# Patient Record
Sex: FEMALE | Race: WHITE | NOT HISPANIC OR LATINO | Employment: UNEMPLOYED | URBAN - METROPOLITAN AREA
[De-identification: names, ages, dates, MRNs, and addresses within clinical notes are randomized per-mention and may not be internally consistent; named-entity substitution may affect disease eponyms.]

---

## 2018-03-19 ENCOUNTER — HOSPITAL ENCOUNTER (EMERGENCY)
Facility: HOSPITAL | Age: 43
Discharge: LEFT BEFORE DISCHARGE | End: 2018-03-19

## 2018-03-19 VITALS
OXYGEN SATURATION: 99 % | SYSTOLIC BLOOD PRESSURE: 132 MMHG | TEMPERATURE: 98 F | HEART RATE: 77 BPM | HEIGHT: 67 IN | DIASTOLIC BLOOD PRESSURE: 62 MMHG | RESPIRATION RATE: 16 BRPM | BODY MASS INDEX: 34.53 KG/M2 | WEIGHT: 220 LBS

## 2018-05-14 ENCOUNTER — OFFICE VISIT (OUTPATIENT)
Dept: NEUROLOGY | Facility: CLINIC | Age: 43
End: 2018-05-14

## 2018-05-14 VITALS
SYSTOLIC BLOOD PRESSURE: 108 MMHG | HEIGHT: 66 IN | DIASTOLIC BLOOD PRESSURE: 70 MMHG | WEIGHT: 230 LBS | HEART RATE: 65 BPM | RESPIRATION RATE: 18 BRPM | OXYGEN SATURATION: 98 % | BODY MASS INDEX: 36.96 KG/M2

## 2018-05-14 DIAGNOSIS — R40.4 TRANSIENT ALTERATION OF AWARENESS: ICD-10-CM

## 2018-05-14 DIAGNOSIS — G43.C0 PERIODIC HEADACHE SYNDROME, NOT INTRACTABLE: Primary | ICD-10-CM

## 2018-05-14 PROCEDURE — 99245 OFF/OP CONSLTJ NEW/EST HI 55: CPT | Performed by: PSYCHIATRY & NEUROLOGY

## 2018-05-14 RX ORDER — LANOLIN ALCOHOL/MO/W.PET/CERES
1000 CREAM (GRAM) TOPICAL DAILY
COMMUNITY
End: 2019-02-11

## 2018-05-14 RX ORDER — DIVALPROEX SODIUM 500 MG/1
500 TABLET, EXTENDED RELEASE ORAL DAILY
Qty: 30 TABLET | Refills: 10 | Status: SHIPPED | OUTPATIENT
Start: 2018-05-14 | End: 2018-06-13

## 2018-05-14 RX ORDER — CETIRIZINE HYDROCHLORIDE 10 MG/1
10 TABLET ORAL DAILY
COMMUNITY
End: 2019-01-18 | Stop reason: SDUPTHER

## 2018-05-14 NOTE — PROGRESS NOTES
Subjective   Patient ID: Tika Pugh is a 42 y.o. female     Chief Complaint   Patient presents with   • Altered Mental Status   • Dizziness   • Numbness     In hands        History of Present Illness    42 y.o. female referred by Dr Carmen Power for AMS and dizziness.  Sx started in February 2018 trouble understanding and producing speech.  Sx present for a month.      Migraines are daily.  Loses vision and then develops HA.  Base of head has squeezing sensation.  Moderate intensity.  Present since February.  Visual auras.  Sensitive to light and sound.  Trouble with memory.  Assoc sx of dizziness with HA.  Bilateral tingling in hands since March 2018.      One visit to ED at  [facility] 200/100 with alerted speech.    Second visit to ED with altered speech.    24 Heart monitor normal  Echo - normal     Tob 1 ppd    Started B12 injections.     MRI brain, my review of films, 3/21/18 few scattered T2 white matter lesions.     Past Medical History:   Diagnosis Date   • Migraine      Family History   Problem Relation Age of Onset   • Diabetes Father    • Hypertension Father    • Cancer Brother    • Heart disease Maternal Grandmother    • Stroke Paternal Grandmother      Social History     Social History   • Marital status: Unknown     Social History Main Topics   • Smoking status: Current Every Day Smoker     Packs/day: 1.00     Types: Cigarettes   • Alcohol use Yes      Comment: Social   • Drug use: No   • Sexual activity: Defer     Other Topics Concern   • Not on file       Review of Systems   Constitutional: Positive for fatigue. Negative for activity change and unexpected weight change.   HENT: Negative for tinnitus and trouble swallowing.    Eyes: Negative for photophobia and visual disturbance.   Respiratory: Negative for apnea, cough and choking.    Cardiovascular: Negative for leg swelling.   Gastrointestinal: Negative for nausea and vomiting.   Endocrine: Negative for cold intolerance and heat intolerance.  "  Genitourinary: Negative for difficulty urinating, frequency, menstrual problem and urgency.   Musculoskeletal: Negative for back pain, gait problem, myalgias and neck pain.   Skin: Negative for color change and rash.   Allergic/Immunologic: Negative for immunocompromised state.   Neurological: Positive for dizziness, weakness, numbness and headaches. Negative for tremors, seizures, syncope, facial asymmetry, speech difficulty and light-headedness.   Hematological: Negative for adenopathy. Does not bruise/bleed easily.   Psychiatric/Behavioral: Positive for confusion and decreased concentration. Negative for behavioral problems, hallucinations and sleep disturbance.       Objective     Vitals:    05/14/18 1346   BP: 108/70   BP Location: Right arm   Patient Position: Sitting   Cuff Size: Adult   Pulse: 65   Resp: 18   SpO2: 98%   Weight: 104 kg (230 lb)   Height: 167.6 cm (66\")       Neurologic Exam     Mental Status   Oriented to person, place, and time.   Registration: recalls 3 of 3 objects. Recall at 5 minutes: recalls 3 of 3 objects. Follows 3 step commands.   Attention: normal. Concentration: normal.   Speech: speech is normal   Level of consciousness: alert  Knowledge: good and consistent with education.   Able to name object. Able to read. Able to repeat. Able to write. Normal comprehension.     Cranial Nerves     CN II   Visual fields full to confrontation.   Visual acuity: normal  Right visual field deficit: none  Left visual field deficit: none     CN III, IV, VI   Pupils are equal, round, and reactive to light.  Extraocular motions are normal.   Right pupil: Shape: regular. Reactivity: brisk. Consensual response: intact.   Left pupil: Shape: regular. Reactivity: brisk. Consensual response: intact.   Nystagmus: none   Diplopia: none  Ophthalmoparesis: none  Upgaze: normal  Downgaze: normal  Conjugate gaze: present  Vestibulo-ocular reflex: present    CN V   Facial sensation intact.   Right corneal " reflex: normal  Left corneal reflex: normal    CN VII   Right facial weakness: none  Left facial weakness: none    CN VIII   Hearing: intact    CN IX, X   Palate: symmetric  Right gag reflex: normal  Left gag reflex: normal    CN XI   Right sternocleidomastoid strength: normal  Left sternocleidomastoid strength: normal    CN XII   Tongue: not atrophic  Fasciculations: absent  Tongue deviation: none    Motor Exam   Muscle bulk: normal  Overall muscle tone: normal  Right arm tone: normal  Left arm tone: normal  Right leg tone: normal  Left leg tone: normal    Strength   Strength 5/5 throughout.     Sensory Exam   Light touch normal.   Vibration normal.   Proprioception normal.   Pinprick normal.     Gait, Coordination, and Reflexes     Gait  Gait: normal    Coordination   Romberg: negative  Finger to nose coordination: normal  Heel to shin coordination: normal  Tandem walking coordination: normal    Tremor   Resting tremor: absent  Intention tremor: absent  Action tremor: absent    Reflexes   Reflexes 2+ except as noted.       Physical Exam   Constitutional: She is oriented to person, place, and time. Vital signs are normal. She appears well-developed and well-nourished.   HENT:   Head: Normocephalic and atraumatic.   Eyes: EOM and lids are normal. Pupils are equal, round, and reactive to light.   Fundoscopic exam:       The right eye shows no exudate, no hemorrhage and no papilledema. The right eye shows venous pulsations.        The left eye shows no exudate, no hemorrhage and no papilledema. The left eye shows venous pulsations.   Neck: Normal range of motion and phonation normal. Normal carotid pulses present. Carotid bruit is not present. No thyroid mass and no thyromegaly present.   Cardiovascular: Normal rate, regular rhythm and normal heart sounds.    Pulmonary/Chest: Effort normal.   Neurological: She is oriented to person, place, and time. She has normal strength. She has a normal Finger-Nose-Finger Test, a  normal Heel to [place] Test, a normal Romberg Test and a normal Tandem Gait Test. Gait normal.   Skin: Skin is warm and dry.   Psychiatric: She has a normal mood and affect. Her speech is normal.   Nursing note and vitals reviewed.      No results found for any previous visit.         Assessment/Plan     Problem List Items Addressed This Visit        Cardiovascular and Mediastinum    Periodic headache syndrome, not intractable - Primary    Current Assessment & Plan     Headaches are worsening.  Medication changes per orders.     Start Depakote  mg qhs              Relevant Medications    divalproex (DEPAKOTE ER) 500 MG 24 hr tablet       Other    Transient alteration of awareness    Current Assessment & Plan     Possible sz activity vs complex migraines    Start Depakote ER   EEG          Relevant Orders    EEG (Hospital Performed)      Other Visit Diagnoses    None.            No Follow-up on file.

## 2018-08-23 ENCOUNTER — HOSPITAL ENCOUNTER (EMERGENCY)
Facility: HOSPITAL | Age: 43
Discharge: HOME | End: 2018-08-24
Attending: EMERGENCY MEDICINE | Admitting: EMERGENCY MEDICINE

## 2018-08-23 ENCOUNTER — APPOINTMENT (OUTPATIENT)
Dept: ULTRASOUND IMAGING | Facility: HOSPITAL | Age: 43
End: 2018-08-23

## 2018-08-23 ENCOUNTER — APPOINTMENT (OUTPATIENT)
Dept: CT IMAGING | Facility: HOSPITAL | Age: 43
End: 2018-08-23

## 2018-08-23 DIAGNOSIS — Z34.91 FIRST TRIMESTER PREGNANCY: Primary | ICD-10-CM

## 2018-08-23 LAB — HCG INTACT+B SERPL-ACNC: NORMAL MIU/ML

## 2018-08-23 PROCEDURE — 84702 CHORIONIC GONADOTROPIN TEST: CPT | Performed by: PHYSICIAN ASSISTANT

## 2018-08-23 PROCEDURE — 76817 TRANSVAGINAL US OBSTETRIC: CPT

## 2018-08-23 PROCEDURE — 99283 EMERGENCY DEPT VISIT LOW MDM: CPT

## 2018-08-23 RX ORDER — SODIUM CHLORIDE 0.9 % (FLUSH) 0.9 %
10 SYRINGE (ML) INJECTION AS NEEDED
Status: DISCONTINUED | OUTPATIENT
Start: 2018-08-23 | End: 2018-08-23

## 2018-08-23 RX ORDER — ONDANSETRON 2 MG/ML
4 INJECTION INTRAMUSCULAR; INTRAVENOUS ONCE
Status: DISCONTINUED | OUTPATIENT
Start: 2018-08-23 | End: 2018-08-23

## 2018-08-24 VITALS
RESPIRATION RATE: 18 BRPM | HEART RATE: 80 BPM | BODY MASS INDEX: 38.15 KG/M2 | SYSTOLIC BLOOD PRESSURE: 125 MMHG | WEIGHT: 237.38 LBS | TEMPERATURE: 98.1 F | OXYGEN SATURATION: 100 % | DIASTOLIC BLOOD PRESSURE: 66 MMHG | HEIGHT: 66 IN

## 2018-08-24 RX ORDER — PNV NO.118/IRON FUMARATE/FA 29 MG-1 MG
1 TABLET,CHEWABLE ORAL DAILY
Qty: 30 TABLET | Refills: 0 | Status: SHIPPED | OUTPATIENT
Start: 2018-08-24 | End: 2018-09-23

## 2018-08-24 NOTE — ED PROVIDER NOTES
Subjective    42-year-old female presents with possible pregnancy.  She is on double shots every 3 months, she's had an irregular period for several years.  She states that she took several pregnancy test that showed positive.  She's had some nausea and abdominal cramping.  She is also had some spotting she come to the emergency department to be evaluated for possible pregnancy while on Shots.  She's never been pregnant.  No abdominal pain  No fever no chills        History provided by:  Patient   used: No        Review of Systems   Gastrointestinal: Positive for nausea.   Genitourinary: Positive for menstrual problem.        Possible pregnancy   All other systems reviewed and are negative.      Past Medical History:   Diagnosis Date   • Migraine        No Known Allergies    History reviewed. No pertinent surgical history.    Family History   Problem Relation Age of Onset   • Diabetes Father    • Hypertension Father    • Cancer Brother    • Heart disease Maternal Grandmother    • Stroke Paternal Grandmother        Social History     Social History   • Marital status: Unknown     Social History Main Topics   • Smoking status: Current Every Day Smoker     Packs/day: 1.00     Types: Cigarettes   • Alcohol use Yes      Comment: Social   • Drug use: No   • Sexual activity: Defer     Other Topics Concern   • Not on file           Objective   Physical Exam   Constitutional: She is oriented to person, place, and time. She appears well-developed and well-nourished.   Eyes: EOM are normal.   Neck: Normal range of motion. Neck supple.   Cardiovascular: Normal rate and regular rhythm.    Pulmonary/Chest: Effort normal and breath sounds normal.   Abdominal: Soft. Bowel sounds are normal. She exhibits no distension. There is no tenderness. There is no guarding.   Musculoskeletal: Normal range of motion.   Neurological: She is alert and oriented to person, place, and time. She has normal reflexes.   Skin: Skin  is warm and dry.   Psychiatric: She has a normal mood and affect.   Nursing note and vitals reviewed.      Procedures           ED Course  ED Course as of Aug 24 0022   Thu Aug 23, 2018   2320  HCG 11,626, transvaginal ultrasound ordered  [CS]   Fri Aug 24, 2018   0018  Discussed with Dr. Nowak,  patient is 6 weeks gestation, no active bleeding.  She reports that she is blood type A- however she has not had any bleeding in the last several days she had small spotting weeks ago.  Dr. Nowak stated no Rhogham needed at this time  [CS]      ED Course User Index  [CS] Sridhar Bowden Jr., PA-C                  [facility]      Final diagnoses:   First trimester pregnancy            Sridhar Bowden Jr., PA-C  08/24/18 0022

## 2018-08-24 NOTE — ED NOTES
Called lab about outstanding lab orders; per lab can't add on didn't have purple top; requested lab draw;  will draw labs      Brittney Riley, ASHANTI  08/24/18 0011

## 2018-08-28 ENCOUNTER — INITIAL PRENATAL (OUTPATIENT)
Dept: OBSTETRICS AND GYNECOLOGY | Facility: CLINIC | Age: 43
End: 2018-08-28

## 2018-08-28 VITALS — WEIGHT: 229 LBS | DIASTOLIC BLOOD PRESSURE: 70 MMHG | SYSTOLIC BLOOD PRESSURE: 122 MMHG | BODY MASS INDEX: 36.96 KG/M2

## 2018-08-28 DIAGNOSIS — R51.9 CHRONIC NONINTRACTABLE HEADACHE, UNSPECIFIED HEADACHE TYPE: ICD-10-CM

## 2018-08-28 DIAGNOSIS — K21.9 GASTROESOPHAGEAL REFLUX DISEASE, ESOPHAGITIS PRESENCE NOT SPECIFIED: ICD-10-CM

## 2018-08-28 DIAGNOSIS — O09.521 AMA (ADVANCED MATERNAL AGE) MULTIGRAVIDA 35+, FIRST TRIMESTER: ICD-10-CM

## 2018-08-28 DIAGNOSIS — G89.29 CHRONIC NONINTRACTABLE HEADACHE, UNSPECIFIED HEADACHE TYPE: ICD-10-CM

## 2018-08-28 DIAGNOSIS — O99.331 MATERNAL TOBACCO USE IN FIRST TRIMESTER: ICD-10-CM

## 2018-08-28 DIAGNOSIS — Z34.91 PRENATAL CARE IN FIRST TRIMESTER: Primary | ICD-10-CM

## 2018-08-28 LAB
C TRACH RRNA SPEC DONR QL NAA+PROBE: NEGATIVE
N GONORRHOEA DNA SPEC QL NAA+PROBE: NEGATIVE

## 2018-08-28 PROCEDURE — 0501F PRENATAL FLOW SHEET: CPT | Performed by: OBSTETRICS & GYNECOLOGY

## 2018-08-28 NOTE — PROGRESS NOTES
@SUBJECTIVEBEGIN  Chief Complaint   Patient presents with   • Possible Pregnancy     NEW OB. HAD ULTRASOUND IN THE ER ON FRIDAY. LMP IS UNKNOWN AND JUST WASNT FEELING GOOD. LAST PAP WAS 2 YEARS AGO       Tika Pugh is a 42 y.o. year old .  No LMP recorded (lmp unknown). Patient is pregnant.  She presents to be seen to initiate prenatal care.     This is an unplanned but desired pregnancy.  She reports being on Depo-Provera injections, but her last injection was 4/10/18.  She thought the chances of her becoming pregnant were slim she didn't follow up for her next shot.  She has always wanted a child and this is a pleasant surprise for her.  She denies any vaginal bleeding or pelvic discomfort/pain.  She reports mild nausea.      She is currently taking Zyrtec and vitamin B12.  She reports a profound deficiency requiring aggressive replacement in the past.  Her most recent levels have been stable.    She reports a long history with migraines and inquires about safe medication to take term pregnancy.    She has a history significant for GERD and ulcers.  No current symptoms at the moment.    Smoking status: Current Every Day Smoker                                                   Packs/day: 1.00      Years: 0.00         Types: Cigarettes     Smokeless tobacco: Not on file                       She is willing to quit smoking and would like to discuss options for this today.    ROS: All systems were reviewed and negative except as stated in the history of present illness.    The following portions of the patient's history were reviewed and updated as appropriate:vital signs, allergies, current medications, past medical history, past social history, past surgical history and problem list.    Vitals:    18 1453   BP: 122/70   Weight: 104 kg (229 lb)       PHYSICAL EXAM   General appearance: well nourished, well hydrated, no acute distress   Neck: supple, no masses, trachea midline  Thyroid: no nodules, masses,  tenderness, or enlargement  Lungs: Unlabored respirations  Abdomen: soft, non distended, non-tender, no masses  Genitourinary:   · External genitalia: Normal, no lesions or discharge.  Normal Bartholin's and [place]'s glands.  · Urethra: no discharge  · Bladder: well supported  · Vagina:  Without lesions or discharge  · Cervix:  Normal appearance, without lesions or discharge  · Uterus:  Normal sized, mobile, nontender.   · Adnexa: no masses or tenderness  Lymphatic:   · Neck: no cervical adenopathy  · Groin: no inguinal adenopathy  · Misc. lymph nodes: no other adenopathy   Skin Inspection: no rashes, lesions, or ulcerations  Mental Status Exam:   · Judgment, insight: intact  · Orientation: oriented to time, place, and person  · Memory: intact for recent and remote events  · Mood and affect: no depression, anxiety, or agitation    Lab Review   HCG    Imaging   Pelvic ultrasound report     Independent review of image:  (TVUS 8/23) - IUP at 6+0 weeks with positive fetal cardiac activity, SOPHIE 04/18/19    ASSESSMENT  1.  IUP at 6+5 weeks  2.  Supervision of low risk pregnancy  3.  AMA  4.  Chronic headaches  5.  GERD  6. Tobacco use in pregnancy    PLAN  The problem list for pregnancy was initiated today    Tests ordered today:  Orders Placed This Encounter   Procedures   • OB Panel With HIV   • Cystic Fibrosis Diagnostic Study   • Urinalysis With Culture If Indicated - Urine, Clean Catch     Pap smear was collected today.     Testing for GC / Chlamydia / trichomonas was done today    Genetic testing reviewed: she has considered the information and is interested in having genetic testing performed.  This will be done at her next visit    Information reviewed: smoking in pregnancy, exercise in pregnancy, nutrition in pregnancy, weight gain in pregnancy, work and travel restrictions during pregnancy, list of OTC medications acceptable in pregnancy and call coverage groups.      We discussed starting with Tylenol for  headaches. SAB precautions were reviewed.    We discussed smoking cessation options today.  We discussed that vapor use during pregnancy has not been well studied as of yet.  She is interested in nicotine patches.  These were ordered.    Follow up: 4 week(s)    Mark Nowak MD  Obstetrics and Gynecology  Psychiatric

## 2018-08-29 PROBLEM — R40.4 TRANSIENT ALTERATION OF AWARENESS: Status: RESOLVED | Noted: 2018-05-14 | Resolved: 2018-08-29

## 2018-08-29 PROBLEM — O09.521 AMA (ADVANCED MATERNAL AGE) MULTIGRAVIDA 35+, FIRST TRIMESTER: Status: ACTIVE | Noted: 2018-08-29

## 2018-08-29 PROBLEM — G89.29 CHRONIC NONINTRACTABLE HEADACHE: Status: ACTIVE | Noted: 2018-08-29

## 2018-08-29 PROBLEM — O99.331 MATERNAL TOBACCO USE IN FIRST TRIMESTER: Status: ACTIVE | Noted: 2018-08-29

## 2018-08-29 PROBLEM — G43.C0 PERIODIC HEADACHE SYNDROME, NOT INTRACTABLE: Status: RESOLVED | Noted: 2018-05-14 | Resolved: 2018-08-29

## 2018-08-29 PROBLEM — R51.9 CHRONIC NONINTRACTABLE HEADACHE: Status: ACTIVE | Noted: 2018-08-29

## 2018-08-29 PROBLEM — K21.9 GASTROESOPHAGEAL REFLUX DISEASE: Status: ACTIVE | Noted: 2018-08-29

## 2018-08-29 RX ORDER — NICOTINE 21 MG/24HR
1 PATCH, TRANSDERMAL 24 HOURS TRANSDERMAL EVERY 24 HOURS
Qty: 30 PATCH | Refills: 5 | Status: SHIPPED | OUTPATIENT
Start: 2018-08-29 | End: 2019-02-11

## 2018-09-07 DIAGNOSIS — Z34.91 PRENATAL CARE IN FIRST TRIMESTER: ICD-10-CM

## 2018-09-10 LAB
ABO GROUP BLD: (no result)
APPEARANCE UR: ABNORMAL
BACTERIA #/AREA URNS HPF: ABNORMAL /HPF
BACTERIA UR CULT: NORMAL
BACTERIA UR CULT: NORMAL
BASOPHILS # BLD AUTO: 0 X10E3/UL (ref 0–0.2)
BASOPHILS NFR BLD AUTO: 0 %
BILIRUB UR QL STRIP: NEGATIVE
BLD GP AB SCN SERPL QL: NEGATIVE
CFTR MUT ANL BLD/T: NORMAL
COLOR UR: YELLOW
CRYSTALS URNS MICRO: ABNORMAL
EOSINOPHIL # BLD AUTO: 0.2 X10E3/UL (ref 0–0.4)
EOSINOPHIL NFR BLD AUTO: 2 %
EPI CELLS #/AREA URNS HPF: >10 /HPF
ERYTHROCYTE [DISTWIDTH] IN BLOOD BY AUTOMATED COUNT: 14.7 % (ref 12.3–15.4)
GLUCOSE UR QL: NEGATIVE
HBV SURFACE AG SERPL QL IA: NEGATIVE
HCT VFR BLD AUTO: 39.1 % (ref 34–46.6)
HCV AB S/CO SERPL IA: 0.1 S/CO RATIO (ref 0–0.9)
HGB BLD-MCNC: 13.4 G/DL (ref 11.1–15.9)
HGB UR QL STRIP: ABNORMAL
HIV 1+2 AB+HIV1 P24 AG SERPL QL IA: NON REACTIVE
IMM GRANULOCYTES # BLD: 0 X10E3/UL (ref 0–0.1)
IMM GRANULOCYTES NFR BLD: 0 %
KETONES UR QL STRIP: NEGATIVE
LABORATORY COMMENT REPORT: NORMAL
LEUKOCYTE ESTERASE UR QL STRIP: ABNORMAL
LYMPHOCYTES # BLD AUTO: 2.1 X10E3/UL (ref 0.7–3.1)
LYMPHOCYTES NFR BLD AUTO: 23 %
MCH RBC QN AUTO: 30.3 PG (ref 26.6–33)
MCHC RBC AUTO-ENTMCNC: 34.3 G/DL (ref 31.5–35.7)
MCV RBC AUTO: 89 FL (ref 79–97)
MICRO URNS: ABNORMAL
MONOCYTES # BLD AUTO: 0.6 X10E3/UL (ref 0.1–0.9)
MONOCYTES NFR BLD AUTO: 7 %
MUCOUS THREADS URNS QL MICRO: PRESENT /HPF
NEUTROPHILS # BLD AUTO: 6.4 X10E3/UL (ref 1.4–7)
NEUTROPHILS NFR BLD AUTO: 68 %
NITRITE UR QL STRIP: NEGATIVE
PH UR STRIP: 5 [PH] (ref 5–7.5)
PLATELET # BLD AUTO: 262 X10E3/UL (ref 150–379)
PROT UR QL STRIP: ABNORMAL
RBC # BLD AUTO: 4.42 X10E6/UL (ref 3.77–5.28)
RBC #/AREA URNS HPF: ABNORMAL /HPF
RH BLD: NEGATIVE
RPR SER QL: NON REACTIVE
RUBV IGG SERPL IA-ACNC: 12.5 INDEX
SP GR UR: 1.02 (ref 1–1.03)
UNIDENT CRYS URNS QL MICRO: PRESENT /LPF
URINALYSIS REFLEX: ABNORMAL
UROBILINOGEN UR STRIP-MCNC: 0.2 MG/DL (ref 0.2–1)
WBC # BLD AUTO: 9.4 X10E3/UL (ref 3.4–10.8)
WBC #/AREA URNS HPF: >30 /HPF

## 2018-09-13 PROBLEM — Z67.91 RH NEGATIVE STATUS DURING PREGNANCY: Status: ACTIVE | Noted: 2018-08-28

## 2018-09-13 PROBLEM — O26.899 RH NEGATIVE STATUS DURING PREGNANCY: Status: ACTIVE | Noted: 2018-08-28

## 2018-09-24 ENCOUNTER — ROUTINE PRENATAL (OUTPATIENT)
Dept: OBSTETRICS AND GYNECOLOGY | Facility: CLINIC | Age: 43
End: 2018-09-24

## 2018-09-24 VITALS — DIASTOLIC BLOOD PRESSURE: 70 MMHG | WEIGHT: 238 LBS | SYSTOLIC BLOOD PRESSURE: 118 MMHG | BODY MASS INDEX: 38.41 KG/M2

## 2018-09-24 DIAGNOSIS — O09.521 AMA (ADVANCED MATERNAL AGE) MULTIGRAVIDA 35+, FIRST TRIMESTER: Primary | ICD-10-CM

## 2018-09-24 PROCEDURE — 0502F SUBSEQUENT PRENATAL CARE: CPT | Performed by: OBSTETRICS & GYNECOLOGY

## 2018-09-29 ENCOUNTER — APPOINTMENT (OUTPATIENT)
Dept: ULTRASOUND IMAGING | Facility: HOSPITAL | Age: 43
End: 2018-09-29

## 2018-09-29 ENCOUNTER — HOSPITAL ENCOUNTER (EMERGENCY)
Facility: HOSPITAL | Age: 43
Discharge: HOME | End: 2018-09-29
Attending: EMERGENCY MEDICINE | Admitting: EMERGENCY MEDICINE

## 2018-09-29 VITALS
OXYGEN SATURATION: 100 % | TEMPERATURE: 98.2 F | HEIGHT: 66 IN | BODY MASS INDEX: 37.73 KG/M2 | HEART RATE: 65 BPM | WEIGHT: 234.8 LBS | SYSTOLIC BLOOD PRESSURE: 111 MMHG | DIASTOLIC BLOOD PRESSURE: 53 MMHG | RESPIRATION RATE: 17 BRPM

## 2018-09-29 DIAGNOSIS — O46.8X1 SUBCHORIONIC HEMORRHAGE OF PLACENTA IN FIRST TRIMESTER, SINGLE OR UNSPECIFIED FETUS: Primary | ICD-10-CM

## 2018-09-29 DIAGNOSIS — O41.8X10 SUBCHORIONIC HEMORRHAGE OF PLACENTA IN FIRST TRIMESTER, SINGLE OR UNSPECIFIED FETUS: Primary | ICD-10-CM

## 2018-09-29 LAB
ANION GAP SERPL CALCULATED.3IONS-SCNC: 9.1 MMOL/L (ref 10–20)
B-HCG UR QL: POSITIVE
BACTERIA UR QL AUTO: ABNORMAL /HPF
BASOPHILS # BLD AUTO: 0.05 10*3/MM3 (ref 0–0.2)
BASOPHILS NFR BLD AUTO: 0.6 % (ref 0–2.5)
BILIRUB UR QL STRIP: NEGATIVE
BUN BLD-MCNC: 6 MG/DL (ref 7–20)
BUN/CREAT SERPL: 12 (ref 7.1–23.5)
CALCIUM SPEC-SCNC: 9.3 MG/DL (ref 8.4–10.2)
CHLORIDE SERPL-SCNC: 109 MMOL/L (ref 98–107)
CLARITY UR: ABNORMAL
CO2 SERPL-SCNC: 24 MMOL/L (ref 26–30)
COLOR UR: YELLOW
CREAT BLD-MCNC: 0.5 MG/DL (ref 0.6–1.3)
DEPRECATED RDW RBC AUTO: 47.3 FL (ref 37–54)
EOSINOPHIL # BLD AUTO: 0.21 10*3/MM3 (ref 0–0.7)
EOSINOPHIL NFR BLD AUTO: 2.6 % (ref 0–7)
ERYTHROCYTE [DISTWIDTH] IN BLOOD BY AUTOMATED COUNT: 14.2 % (ref 11.5–14.5)
GFR SERPL CREATININE-BSD FRML MDRD: 137 ML/MIN/1.73
GLUCOSE BLD-MCNC: 88 MG/DL (ref 74–98)
GLUCOSE UR STRIP-MCNC: NEGATIVE MG/DL
HCG INTACT+B SERPL-ACNC: NORMAL MIU/ML
HCT VFR BLD AUTO: 36.5 % (ref 37–47)
HGB BLD-MCNC: 11.8 G/DL (ref 12–16)
HGB UR QL STRIP.AUTO: ABNORMAL
HYALINE CASTS UR QL AUTO: ABNORMAL /LPF
IMM GRANULOCYTES # BLD: 0.04 10*3/MM3 (ref 0–0.06)
IMM GRANULOCYTES NFR BLD: 0.5 % (ref 0–0.6)
KETONES UR QL STRIP: NEGATIVE
LEUKOCYTE ESTERASE UR QL STRIP.AUTO: NEGATIVE
LYMPHOCYTES # BLD AUTO: 1.58 10*3/MM3 (ref 0.6–3.4)
LYMPHOCYTES NFR BLD AUTO: 19.5 % (ref 10–50)
MCH RBC QN AUTO: 29.4 PG (ref 27–31)
MCHC RBC AUTO-ENTMCNC: 32.3 G/DL (ref 30–37)
MCV RBC AUTO: 90.8 FL (ref 81–99)
MONOCYTES # BLD AUTO: 0.49 10*3/MM3 (ref 0–0.9)
MONOCYTES NFR BLD AUTO: 6 % (ref 0–12)
NEUTROPHILS # BLD AUTO: 5.74 10*3/MM3 (ref 2–6.9)
NEUTROPHILS NFR BLD AUTO: 70.8 % (ref 37–80)
NITRITE UR QL STRIP: NEGATIVE
NRBC BLD MANUAL-RTO: 0 /100 WBC (ref 0–0)
PH UR STRIP.AUTO: 7.5 [PH] (ref 5–8)
PLATELET # BLD AUTO: 230 10*3/MM3 (ref 130–400)
PMV BLD AUTO: 9.3 FL (ref 6–12)
POTASSIUM BLD-SCNC: 4.1 MMOL/L (ref 3.5–5.1)
PROT UR QL STRIP: NEGATIVE
RBC # BLD AUTO: 4.02 10*6/MM3 (ref 4.2–5.4)
RBC # UR: ABNORMAL /HPF
REF LAB TEST METHOD: ABNORMAL
SODIUM BLD-SCNC: 138 MMOL/L (ref 137–145)
SP GR UR STRIP: 1.01 (ref 1–1.03)
SQUAMOUS #/AREA URNS HPF: ABNORMAL /HPF
UROBILINOGEN UR QL STRIP: ABNORMAL
WBC NRBC COR # BLD: 8.11 10*3/MM3 (ref 4.8–10.8)
WBC UR QL AUTO: ABNORMAL /HPF

## 2018-09-29 PROCEDURE — 99283 EMERGENCY DEPT VISIT LOW MDM: CPT

## 2018-09-29 PROCEDURE — 25010000002 RHO D IMMUNE GLOBULIN 1500 UNIT/2ML SOLUTION PREFILLED SYRINGE: Performed by: EMERGENCY MEDICINE

## 2018-09-29 PROCEDURE — 76817 TRANSVAGINAL US OBSTETRIC: CPT

## 2018-09-29 PROCEDURE — 81001 URINALYSIS AUTO W/SCOPE: CPT | Performed by: EMERGENCY MEDICINE

## 2018-09-29 PROCEDURE — 96372 THER/PROPH/DIAG INJ SC/IM: CPT

## 2018-09-29 PROCEDURE — 81025 URINE PREGNANCY TEST: CPT | Performed by: EMERGENCY MEDICINE

## 2018-09-29 PROCEDURE — 85025 COMPLETE CBC W/AUTO DIFF WBC: CPT | Performed by: EMERGENCY MEDICINE

## 2018-09-29 PROCEDURE — 80048 BASIC METABOLIC PNL TOTAL CA: CPT | Performed by: EMERGENCY MEDICINE

## 2018-09-29 PROCEDURE — 84702 CHORIONIC GONADOTROPIN TEST: CPT | Performed by: EMERGENCY MEDICINE

## 2018-09-29 RX ADMIN — HUMAN RHO(D) IMMUNE GLOBULIN 300 MCG: 1500 SOLUTION INTRAMUSCULAR; INTRAVENOUS at 10:44

## 2018-10-01 ENCOUNTER — ROUTINE PRENATAL (OUTPATIENT)
Dept: OBSTETRICS AND GYNECOLOGY | Facility: CLINIC | Age: 43
End: 2018-10-01

## 2018-10-01 VITALS — DIASTOLIC BLOOD PRESSURE: 72 MMHG | BODY MASS INDEX: 38.09 KG/M2 | WEIGHT: 236 LBS | SYSTOLIC BLOOD PRESSURE: 128 MMHG

## 2018-10-01 DIAGNOSIS — D25.9 LEIOMYOMA IN PREGNANCY, UTERINE, ANTEPARTUM: ICD-10-CM

## 2018-10-01 DIAGNOSIS — O34.10 LEIOMYOMA IN PREGNANCY, UTERINE, ANTEPARTUM: ICD-10-CM

## 2018-10-01 DIAGNOSIS — O09.521 ELDERLY MULTIGRAVIDA IN FIRST TRIMESTER: ICD-10-CM

## 2018-10-01 DIAGNOSIS — O26.851 SPOTTING AFFECTING PREGNANCY IN FIRST TRIMESTER: Primary | ICD-10-CM

## 2018-10-01 DIAGNOSIS — O09.91 ENCOUNTER FOR SUPERVISION OF HIGH RISK PREGNANCY IN FIRST TRIMESTER, ANTEPARTUM: ICD-10-CM

## 2018-10-01 PROCEDURE — 0502F SUBSEQUENT PRENATAL CARE: CPT | Performed by: OBSTETRICS & GYNECOLOGY

## 2018-10-01 NOTE — PROGRESS NOTES
Chief Complaint   Patient presents with   • Pregnancy Problem     PATIENT ADVISED SEEN IN ER SATURDAY, VAGINAL BLEEDING. PATIENT ADVISED BLEEDING STOPPED THIS AM.        HPI: Tika is a  currently at 11w4d who today reports the following:  Contractions - No; Leaking - No; Vaginal bleeding -  YES; Heartburn - No.  Pt had been seen over the weekend with vaginal bleeding in ER.  Pt reports heavy worse than a normal menses for her; menses previously light.  Pt had not been doing any unusual activities.  Pt denies any recent sexual activity.  Pt reports she had scan done as well as labs; did receive rhogam.  Pt has continued to have spotting until this am.  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  Pt did have scan done at ER which showed leiomyoma as well as small BILLY.  Pt has been using nicotine patches as well.  Pt does desire genetic screening as well.  ROS:   GI:   Nausea - No; Constipation - No; Diarrhea - No.   Neuro:  Headache - No; Visual disturbances - No.    EXAM:   Vitals:  See prenatal flowsheet as noted and reviewed   Abdomen:   See prenatal flowsheet as noted and reviewed   Pelvic:  See prenatal flowsheet as noted and reviewed   Urine:  See prenatal flowsheet as noted and reviewed     Lab Results   Component Value Date    ABO A 2018    RH Negative 2018    ABSCRN Negative 2018       Results for orders placed in visit on 10/01/18   US Ob Transvaginal    Narrative Tika Pugh  : 1975  MRN: [id number]  Date: 10/1/2018    Reason for exam/History:  Vaginal bleeding    Images are reviewed from the transvaginal ultrasound.  An intrauterine   pregnancy is noted.  A gestational sac is seen.  A yolk sac is not noted.    The pregnancy measures 12 weeks 1 days gestation.  Cardiac activity is   noted.  The fetal heart rate measures 179  beats per minute.  The uterus   is noted to have intramural/subserosal leiomyoma measuring 3.9 cm.  There   is also  a 3.1 cm hyperechoic mass seen in the uterus distorting the   gestational sac.  The adnexa was noted to be normal} in appearance. There   was noted to be two small simple cysts of the left ovary 1.8 cm and 1.7   cm.   There was normal vascular flow.  Free fluid was not seen.    The exam limitations noted:  none    See ultrasound report for measurements and structures identified.    Monse Wayne MD, [facility]  OB GYN [place]             MDM:  Impression: Supervision of high risk pregnancy  Vaginal bleeding during pregnancy  AMA  Tobacco use in pregnancy   BILLY  Leiomyoma   Tests done today: U/S for evaluation of bleeding; see report   Topics discussed: ab precautions  tobacco use  Recommend modified bedrest; off work x 1 week; pelvic rest   Tests next visit: U/S for for f/u of the above - repeat in 1 week; will probably need referral to [facility]  Genetic screening as desired     This note was electronically signed.  Monse Wayne M.D.

## 2018-10-08 ENCOUNTER — ROUTINE PRENATAL (OUTPATIENT)
Dept: OBSTETRICS AND GYNECOLOGY | Facility: CLINIC | Age: 43
End: 2018-10-08

## 2018-10-08 VITALS — DIASTOLIC BLOOD PRESSURE: 70 MMHG | BODY MASS INDEX: 37.45 KG/M2 | WEIGHT: 232 LBS | SYSTOLIC BLOOD PRESSURE: 130 MMHG

## 2018-10-08 DIAGNOSIS — O09.521 AMA (ADVANCED MATERNAL AGE) MULTIGRAVIDA 35+, FIRST TRIMESTER: Primary | ICD-10-CM

## 2018-10-08 PROCEDURE — 0502F SUBSEQUENT PRENATAL CARE: CPT | Performed by: OBSTETRICS & GYNECOLOGY

## 2018-10-08 NOTE — PROGRESS NOTES
Chief Complaint   Patient presents with   • Routine Prenatal Visit     FOLLOW UP ULTRASOUND, PATIENT WANTS TO DISCUSS HEPATIITS A VACCINE, HAD FAMILY MEMBER RECENTLY DIAGNOSED.         HPI:   , 12w4d gestation reports doing well    ROS:  See Prenatal Episode/Flowsheet  /70   Wt 105 kg (232 lb)   LMP  (LMP Unknown)   BMI 37.45 kg/m²      EXAM:  EXTREMITIES:  No swelling-See Prenatal Episode/Flowsheet    ABDOMEN:  FHTs/Movement noted-See Prenatal Episode/Flowsheet    URINE GLUCOSE/PROTEIN:  See Prenatal Episode/Flowsheet    PELVIC EXAM:  See Prenatal Episode/Flowsheet  CV:  Lungs:    MDM:    Lab Results   Component Value Date    HGB 11.8 (L) 2018    RUBELLAABIGG 12.50 2018    HEPBSAG Negative 2018    ABO A 2018    RH Negative 2018    ABSCRN Negative 2018    ZUJ1XAX7 Non Reactive 2018    HEPCVIRUSABY 0.1 2018    URINECX Final report 2018       U/S: Stable 3 senna meter posterior intramural fibroid.  Hyperechoic area consistent with probable subchorionic hematoma noted last time is not evident today.  Fetal heart tones 165.    1. IUP 12w4d  2. Routine care   3. Rec HAV given potnetial exposuires  4. Cont Light actitivy  5. Informaseq today

## 2018-10-10 ENCOUNTER — ROUTINE PRENATAL (OUTPATIENT)
Dept: OBSTETRICS AND GYNECOLOGY | Facility: CLINIC | Age: 43
End: 2018-10-10

## 2018-10-10 ENCOUNTER — TELEPHONE (OUTPATIENT)
Dept: OBSTETRICS AND GYNECOLOGY | Facility: CLINIC | Age: 43
End: 2018-10-10

## 2018-10-10 VITALS — BODY MASS INDEX: 37.77 KG/M2 | SYSTOLIC BLOOD PRESSURE: 120 MMHG | WEIGHT: 234 LBS | DIASTOLIC BLOOD PRESSURE: 70 MMHG

## 2018-10-10 DIAGNOSIS — D25.9 LEIOMYOMA IN PREGNANCY, UTERINE, ANTEPARTUM: ICD-10-CM

## 2018-10-10 DIAGNOSIS — O34.10 LEIOMYOMA IN PREGNANCY, UTERINE, ANTEPARTUM: ICD-10-CM

## 2018-10-10 DIAGNOSIS — O09.521 ELDERLY MULTIGRAVIDA IN FIRST TRIMESTER: ICD-10-CM

## 2018-10-10 DIAGNOSIS — O26.851 SPOTTING AFFECTING PREGNANCY IN FIRST TRIMESTER: ICD-10-CM

## 2018-10-10 DIAGNOSIS — O46.8X1 SUBCHORIONIC HEMATOMA IN FIRST TRIMESTER, SINGLE OR UNSPECIFIED FETUS: ICD-10-CM

## 2018-10-10 DIAGNOSIS — O09.91 ENCOUNTER FOR SUPERVISION OF HIGH RISK PREGNANCY IN FIRST TRIMESTER, ANTEPARTUM: Primary | ICD-10-CM

## 2018-10-10 DIAGNOSIS — O41.8X10 SUBCHORIONIC HEMATOMA IN FIRST TRIMESTER, SINGLE OR UNSPECIFIED FETUS: ICD-10-CM

## 2018-10-10 PROCEDURE — 0502F SUBSEQUENT PRENATAL CARE: CPT | Performed by: OBSTETRICS & GYNECOLOGY

## 2018-10-10 NOTE — TELEPHONE ENCOUNTER
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  Limiting hours to 8 or less a day.  At least a 30 minute break during a shiftfor the next 2 weeks

## 2018-10-10 NOTE — TELEPHONE ENCOUNTER
----- Message from Bk Britt sent at 10/10/2018  9:19 AM EDT -----  Contact: patient  Patient seen Dr. Jeffries on 10/8 and was advised to be on light duty for the next two weeks. Patient called back today stating she returned to work yesterday for a 10 hour shift that involves a lot of walking. Patient is a requesting a more detailed note stating what she can and cannot do.    Please inform patient if this note has been approved or not & she will .  Thanks.

## 2018-10-11 NOTE — PROGRESS NOTES
Chief Complaint   Patient presents with   • Pregnancy Ultrasound     TVS done due to bleeding and cramping since 10:00 am this morning; having dizziness today also       HPI: Tika is a  currently at 13w0d who today reports the following:  Contractions - No; Leaking - No; Vaginal bleeding -  YES; Heartburn - No.  Pt had been off work last week; bleeding subsided.  Pt seen here Monday.  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  Pt reports bleeding is bright red in nature; has pictures on phone which shows light bright red blood on pad.  ROS:   GI:   Nausea - No; Constipation - No; Diarrhea - No.   Neuro:  Headache - No; Visual disturbances - No.    EXAM:   Vitals:  See prenatal flowsheet as noted and reviewed   Abdomen:   See prenatal flowsheet as noted and reviewed   Pelvic:  See prenatal flowsheet as noted and reviewed   Urine:  See prenatal flowsheet as noted and reviewed     Lab Results   Component Value Date    ABO A 2018    RH Negative 2018    ABSCRN Negative 2018     See scan report today which images are reviewed as noted    Images are reviewed from the transvaginal ultrasound.  An intrauterine pregnancy is noted. The crown rump length was measured correlating with a pregnancy of 13 weeks 2 days gestation.  Cardiac activity is noted.  The fetal heart rate measures 161 beats per minute.   A 3.6 cm heterogenous area is seen near the gestational sac and a smaller subchorionic hematoma is seen near the cervix measuring 1.6 cm.  The cervix appears normal.  A 3.8 cm fundal leiomyoma appears the same.    MDM:  Impression: Supervision of high risk pregnancy  BILLY  AMA   Leiomyoma  RH negative - received rhogam previously   Tests done today: U/S for bleeding   Topics discussed: Pelvic rest  Modified bedrest  Note off work until f/u appt  Instructions and precautions given   Tests next visit: U/S for of BILLY     This note was electronically signed.  Monse Wayne  M.D.

## 2018-10-15 LAB
# FETUSES US: 1
CFDNA.FET/CFDNA.TOTAL SFR FETUS: 5 %
CHR X + Y ANEUP PLAS.CFDNA: NORMAL
CITATION REF LAB TEST: NORMAL
CYTOGENETICS STUDY: NORMAL
FET 13+18+21+X+Y ANEUP PLAS.CFDNA: NORMAL
FET CHR 13 TS PLAS.CFDNA QL: NORMAL
FET CHR 13 TS PLAS.CFDNA QL: NORMAL
FET CHR 18 TS PLAS.CFDNA QL: NORMAL
FET CHR 18 TS PLAS.CFDNA QL: NORMAL
FET CHR 21 TS PLAS.CFDNA QL: NORMAL
FET CHR 21 TS PLAS.CFDNA QL: NORMAL
FET CHROM X + Y ANEUP CFDNA IMP: NORMAL
GA: 12.6 WEEKS
GENETIC ALGORITHM SENSITIVITY: NORMAL %
LAB DIRECTOR NAME PROVIDER: NORMAL
Lab: NORMAL
REASON FOR REFERRAL (NARRATIVE): NORMAL
REF LAB TEST METHOD: NORMAL
SERVICE CMNT 02-IMP: NORMAL
SERVICE CMNT-IMP: NORMAL

## 2018-10-22 ENCOUNTER — ROUTINE PRENATAL (OUTPATIENT)
Dept: OBSTETRICS AND GYNECOLOGY | Facility: CLINIC | Age: 43
End: 2018-10-22

## 2018-10-22 VITALS — BODY MASS INDEX: 38.9 KG/M2 | WEIGHT: 241 LBS | DIASTOLIC BLOOD PRESSURE: 60 MMHG | SYSTOLIC BLOOD PRESSURE: 119 MMHG

## 2018-10-22 DIAGNOSIS — O34.10 LEIOMYOMA IN PREGNANCY, UTERINE, ANTEPARTUM: ICD-10-CM

## 2018-10-22 DIAGNOSIS — Z3A.14 14 WEEKS GESTATION OF PREGNANCY: Primary | ICD-10-CM

## 2018-10-22 DIAGNOSIS — O09.521 AMA (ADVANCED MATERNAL AGE) MULTIGRAVIDA 35+, FIRST TRIMESTER: ICD-10-CM

## 2018-10-22 DIAGNOSIS — O46.8X1 SUBCHORIONIC HEMATOMA IN FIRST TRIMESTER, SINGLE OR UNSPECIFIED FETUS: ICD-10-CM

## 2018-10-22 DIAGNOSIS — O09.92 ENCOUNTER FOR SUPERVISION OF HIGH RISK PREGNANCY IN SECOND TRIMESTER, ANTEPARTUM: ICD-10-CM

## 2018-10-22 DIAGNOSIS — D25.9 LEIOMYOMA IN PREGNANCY, UTERINE, ANTEPARTUM: ICD-10-CM

## 2018-10-22 DIAGNOSIS — O41.8X10 SUBCHORIONIC HEMATOMA IN FIRST TRIMESTER, SINGLE OR UNSPECIFIED FETUS: ICD-10-CM

## 2018-10-22 PROCEDURE — 0502F SUBSEQUENT PRENATAL CARE: CPT | Performed by: OBSTETRICS & GYNECOLOGY

## 2018-10-22 NOTE — PROGRESS NOTES
Chief Complaint   Patient presents with   • Routine Prenatal Visit       HPI: Tika is a  currently at 14w4d who today reports the following:  Contractions - No; Leaking - No; Vaginal bleeding -  No; Heartburn - No.  Pt has remained off work; has not been doing any strenuous activities; has been on modified rest.  Pt with no bleeding now x 1 week.  ROS:   GI:   Nausea - No; Constipation - No; Diarrhea - No.   Neuro:  Headache - No; Visual disturbances - No.    EXAM:   Vitals:  See prenatal flowsheet as noted and reviewed   Abdomen:   See prenatal flowsheet as noted and reviewed   Pelvic:  See prenatal flowsheet as noted and reviewed   Urine:  See prenatal flowsheet as noted and reviewed     Lab Results   Component Value Date    ABO A 2018    RH Negative 2018    ABSCRN Negative 2018       MDM:  Impression: Supervision of high risk pregnancy  Known leiomyoma  BILLY  AMA  Tobacco use in pregnancy   Tests done today: none   Topics discussed: ab precautions   Pt to remain off work   Tests next visit: U/S for f/u of BILLY and leiomyoma     This note was electronically signed.  Monse Wayne M.D.

## 2018-10-29 ENCOUNTER — ROUTINE PRENATAL (OUTPATIENT)
Dept: OBSTETRICS AND GYNECOLOGY | Facility: CLINIC | Age: 43
End: 2018-10-29

## 2018-10-29 VITALS — BODY MASS INDEX: 38.25 KG/M2 | SYSTOLIC BLOOD PRESSURE: 128 MMHG | WEIGHT: 237 LBS | DIASTOLIC BLOOD PRESSURE: 72 MMHG

## 2018-10-29 DIAGNOSIS — O99.331 MATERNAL TOBACCO USE IN FIRST TRIMESTER: ICD-10-CM

## 2018-10-29 DIAGNOSIS — Z67.91 RH NEGATIVE STATUS DURING PREGNANCY IN SECOND TRIMESTER: ICD-10-CM

## 2018-10-29 DIAGNOSIS — Z34.92 PRENATAL CARE IN SECOND TRIMESTER: Primary | ICD-10-CM

## 2018-10-29 DIAGNOSIS — O26.892 RH NEGATIVE STATUS DURING PREGNANCY IN SECOND TRIMESTER: ICD-10-CM

## 2018-10-29 DIAGNOSIS — O09.521 AMA (ADVANCED MATERNAL AGE) MULTIGRAVIDA 35+, FIRST TRIMESTER: ICD-10-CM

## 2018-10-29 PROCEDURE — 0502F SUBSEQUENT PRENATAL CARE: CPT | Performed by: OBSTETRICS & GYNECOLOGY

## 2018-11-15 ENCOUNTER — ROUTINE PRENATAL (OUTPATIENT)
Dept: OBSTETRICS AND GYNECOLOGY | Facility: CLINIC | Age: 43
End: 2018-11-15

## 2018-11-15 VITALS — DIASTOLIC BLOOD PRESSURE: 74 MMHG | SYSTOLIC BLOOD PRESSURE: 122 MMHG | BODY MASS INDEX: 39.22 KG/M2 | WEIGHT: 243 LBS

## 2018-11-15 DIAGNOSIS — O09.521 AMA (ADVANCED MATERNAL AGE) MULTIGRAVIDA 35+, FIRST TRIMESTER: ICD-10-CM

## 2018-11-15 DIAGNOSIS — O26.892 RH NEGATIVE STATUS DURING PREGNANCY IN SECOND TRIMESTER: ICD-10-CM

## 2018-11-15 DIAGNOSIS — D51.9 ANEMIA DUE TO VITAMIN B12 DEFICIENCY, UNSPECIFIED B12 DEFICIENCY TYPE: ICD-10-CM

## 2018-11-15 DIAGNOSIS — O99.331 MATERNAL TOBACCO USE IN FIRST TRIMESTER: ICD-10-CM

## 2018-11-15 DIAGNOSIS — Z34.92 PRENATAL CARE IN SECOND TRIMESTER: Primary | ICD-10-CM

## 2018-11-15 DIAGNOSIS — D25.9 UTERINE LEIOMYOMA, UNSPECIFIED LOCATION: ICD-10-CM

## 2018-11-15 DIAGNOSIS — Z3A.18 18 WEEKS GESTATION OF PREGNANCY: Primary | ICD-10-CM

## 2018-11-15 DIAGNOSIS — Z67.91 RH NEGATIVE STATUS DURING PREGNANCY IN SECOND TRIMESTER: ICD-10-CM

## 2018-11-15 LAB
BASOPHILS # BLD AUTO: 0.04 10*3/MM3 (ref 0–0.2)
BASOPHILS NFR BLD AUTO: 0.4 % (ref 0–2.5)
EOSINOPHIL # BLD AUTO: 0.24 10*3/MM3 (ref 0–0.7)
EOSINOPHIL NFR BLD AUTO: 2.2 % (ref 0–7)
ERYTHROCYTE [DISTWIDTH] IN BLOOD BY AUTOMATED COUNT: 14.6 % (ref 11.5–14.5)
FOLATE SERPL-MCNC: 15.9 NG/ML
HCT VFR BLD AUTO: 34.7 % (ref 37–47)
HGB BLD-MCNC: 11.1 G/DL (ref 12–16)
IMM GRANULOCYTES # BLD: 0.03 10*3/MM3 (ref 0–0.06)
IMM GRANULOCYTES NFR BLD: 0.3 % (ref 0–0.6)
LYMPHOCYTES # BLD AUTO: 1.84 10*3/MM3 (ref 0.6–3.4)
LYMPHOCYTES NFR BLD AUTO: 16.8 % (ref 10–50)
MCH RBC QN AUTO: 28.8 PG (ref 27–31)
MCHC RBC AUTO-ENTMCNC: 32 G/DL (ref 30–37)
MCV RBC AUTO: 90.1 FL (ref 81–99)
MONOCYTES # BLD AUTO: 0.71 10*3/MM3 (ref 0–0.9)
MONOCYTES NFR BLD AUTO: 6.5 % (ref 0–12)
NEUTROPHILS # BLD AUTO: 8.09 10*3/MM3 (ref 2–6.9)
NEUTROPHILS NFR BLD AUTO: 73.8 % (ref 37–80)
NRBC BLD AUTO-RTO: 0 /100 WBC (ref 0–0)
PLATELET # BLD AUTO: 221 10*3/MM3 (ref 130–400)
RBC # BLD AUTO: 3.85 10*6/MM3 (ref 4.2–5.4)
VIT B12 SERPL-MCNC: 472 PG/ML (ref 239–931)
WBC # BLD AUTO: 10.95 10*3/MM3 (ref 4.8–10.8)

## 2018-11-15 PROCEDURE — 0502F SUBSEQUENT PRENATAL CARE: CPT | Performed by: OBSTETRICS & GYNECOLOGY

## 2018-11-16 DIAGNOSIS — D50.9 IRON DEFICIENCY ANEMIA, UNSPECIFIED IRON DEFICIENCY ANEMIA TYPE: Primary | ICD-10-CM

## 2018-11-16 RX ORDER — FERROUS SULFATE 325(65) MG
325 TABLET ORAL
Qty: 60 TABLET | Refills: 6 | Status: SHIPPED | OUTPATIENT
Start: 2018-11-16 | End: 2019-04-01

## 2018-11-16 NOTE — PROGRESS NOTES
Chief Complaint   Patient presents with   • Pregnancy Ultrasound     Anatomy scan, No complaints       HPI:   Tika is a  currently at 18w0d who today reports the following:  Contractions - No; Leaking - No; Vaginal bleeding -  No; Swelling of extremities - No. Good fetal movement - YES.     + fatigue, wants to check B12 levels    ROS:  GI: Nausea - No; Constipation - No; Diarrhea - No. RUQ pain - No    Neuro: Headache - No; Visual disturbances - No.    Pertinent items are noted in HPI, all other systems reviewed and negative    Review of History:  The following portions of the patient's history were reviewed and updated as appropriate:problem list, current medications, allergies, past family history, past medical history, past social history and past surgical history.    Current Outpatient Medications on File Prior to Visit   Medication Sig Dispense Refill   • cetirizine (zyrTEC) 10 MG tablet Take 10 mg by mouth Daily.     • Cetirizine-Pseudoephedrine (ZYRTEC-D PO) Take 10 mg by mouth Daily.     • Cyanocobalamin 1000 MCG/ML kit Inject  as directed.     • nicotine (NICODERM CQ) 14 MG/24HR patch Place 1 patch on the skin as directed by provider Daily. 30 patch 5   • vitamin B-12 (CYANOCOBALAMIN) 1000 MCG tablet Take 1,000 mcg by mouth Daily.       No current facility-administered medications on file prior to visit.        EXAM:  /74   Wt 110 kg (243 lb)   LMP  (LMP Unknown)   BMI 39.22 kg/m²     Gen: NAD, conversant  Pulm: No use of accessory muscles, normal respirations  Abdomen: Gravid, nontender, size = dates, + fetal cardiac activity  Ext: no edema, no rashes, WWP  Gait: normal for pregnancy  Psych: Mood, insight, judgement intact  SVE: Not performed     Lab Results   Component Value Date    ABO A 2018    RH Negative 2018    ABSCRN Negative 2018       Social History    Tobacco Use      Smoking status: Current Every Day Smoker        Packs/day: 1.00        Types: Cigarettes       Smokeless tobacco: Never Used      I have reviewed the prenatal labs and previous ultrasounds today.    MDM:  Diagnosis: Supervision of low risk pregnancy  Rh negative  AMA  Tobacco use in pregnancy   Known leiomyoma, stable  Subchorionic hemorrhage, resolved  2 vessel cord  H/o profound B12 deficiency  Fatigue   Tests/Orders/Rx today: Orders Placed This Encounter   Procedures   • Vitamin B12 and Folate   • CBC and Differential     Order Specific Question:   Manual Differential     Answer:   No     IUP at 18+0 weeks. Anatomy was mostly cleared today, but 4 chamber views of the heart and outflow tracts could not be well visualized.  Additionally, there appears to be a two-vessel cord, though this was unsure as well.  No subchorionic hemorrhages seen on today's exam. The fibroid is stable in size.  EFW 227g(55%). Cephalic presentation. Placenta is posterior. Amniotic fluid and fetal heart rate are normal.    Meds Ordered: none   Topics discussed: The above issues   Tests next visit: U/S to complete the anatomic screening   Next visit: 4 week(s)     Mark Nowak MD  Obstetrics and Gynecology  [facility]

## 2018-11-20 PROBLEM — D25.9 UTERINE LEIOMYOMA: Status: ACTIVE | Noted: 2018-11-20

## 2018-11-26 ENCOUNTER — ROUTINE PRENATAL (OUTPATIENT)
Dept: OBSTETRICS AND GYNECOLOGY | Facility: CLINIC | Age: 43
End: 2018-11-26

## 2018-11-26 VITALS — WEIGHT: 245.4 LBS | DIASTOLIC BLOOD PRESSURE: 56 MMHG | SYSTOLIC BLOOD PRESSURE: 120 MMHG | BODY MASS INDEX: 39.61 KG/M2

## 2018-11-26 DIAGNOSIS — M79.89 LEFT LEG SWELLING: Primary | ICD-10-CM

## 2018-11-26 PROCEDURE — 99214 OFFICE O/P EST MOD 30 MIN: CPT | Performed by: OBSTETRICS & GYNECOLOGY

## 2018-11-26 NOTE — PROGRESS NOTES
Chief Complaint   Patient presents with   • Pregnancy Problem     C/O legs swelling and dizziness        HPI:   , 19w4d gestation reports going back to work last Monday--worked 3 days and states her left leg got extremely swollen and raised her feet. PAtient had left knee surgery several years ago--.. PAtient did not relate any sig h/o swelling a/w this past knee surgery.     ROS:  See Prenatal Episode/Flowsheet  /56   Wt 111 kg (245 lb 6.4 oz)   LMP  (LMP Unknown)   BMI 39.61 kg/m²      EXAM:  EXTREMITIES:  No swelling-See Prenatal Episode/Flowsheet    ABDOMEN:  FHTs/Movement noted-See Prenatal Episode/Flowsheet    URINE GLUCOSE/PROTEIN:  See Prenatal Episode/Flowsheet    PELVIC EXAM:  See Prenatal Episode/Flowsheet  Extr: 1+ lower extr bilateral edema.     MDM:    Lab Results   Component Value Date    HGB 11.1 (L) 11/15/2018    RUBELLAABIGG 12.50 2018    HEPBSAG Negative 2018    ABO A 2018    RH Negative 2018    ABSCRN Negative 2018    SUM8GQA1 Non Reactive 2018    HEPCVIRUSABY 0.1 2018    URINECX Final report 2018       U/S:    1. IUP 19w4d  2. Routine care   3. Bilateral lower extremity swelling--symm today though patient relates sig asymm --patient states she went into work today and got very dizzy--ate honey bunches of alirio and milk.... Then ate [place] for lunch after she left work.  Venous doppler. Rec compression, increased hydration, dietray changes, off work until further notice due to AMA and swelling. SPent over 25 miuntes in cousnelling patient and partner.

## 2018-11-27 ENCOUNTER — HOSPITAL ENCOUNTER (OUTPATIENT)
Dept: ULTRASOUND IMAGING | Facility: HOSPITAL | Age: 43
Discharge: HOME | End: 2018-11-27
Attending: OBSTETRICS & GYNECOLOGY | Admitting: OBSTETRICS & GYNECOLOGY

## 2018-11-27 ENCOUNTER — APPOINTMENT (OUTPATIENT)
Dept: ULTRASOUND IMAGING | Facility: HOSPITAL | Age: 43
End: 2018-11-27
Attending: OBSTETRICS & GYNECOLOGY

## 2018-11-27 DIAGNOSIS — M79.89 LEFT LEG SWELLING: ICD-10-CM

## 2018-11-27 PROCEDURE — 93971 EXTREMITY STUDY: CPT

## 2018-12-03 ENCOUNTER — ROUTINE PRENATAL (OUTPATIENT)
Dept: OBSTETRICS AND GYNECOLOGY | Facility: CLINIC | Age: 43
End: 2018-12-03

## 2018-12-03 VITALS — BODY MASS INDEX: 39.54 KG/M2 | DIASTOLIC BLOOD PRESSURE: 70 MMHG | WEIGHT: 245 LBS | SYSTOLIC BLOOD PRESSURE: 126 MMHG

## 2018-12-03 DIAGNOSIS — O09.522 ELDERLY MULTIGRAVIDA IN SECOND TRIMESTER: Primary | ICD-10-CM

## 2018-12-03 PROBLEM — O09.521 AMA (ADVANCED MATERNAL AGE) MULTIGRAVIDA 35+, FIRST TRIMESTER: Status: RESOLVED | Noted: 2018-08-29 | Resolved: 2018-12-03

## 2018-12-03 PROBLEM — O09.529 AMA (ADVANCED MATERNAL AGE) MULTIGRAVIDA 35+: Status: ACTIVE | Noted: 2018-12-03

## 2018-12-03 PROCEDURE — 0502F SUBSEQUENT PRENATAL CARE: CPT | Performed by: MIDWIFE

## 2018-12-03 NOTE — PROGRESS NOTES
Chief Complaint   Patient presents with   • Routine Prenatal Visit     Follow up on swelling per        HPI: Tika is a  currently at 20w4d who today reports the following:   Leaking - No; Heartburn - No. Fetal movement - YES. Doppler studies were negative. She has exhausted all of her PTO time and needs to know about working. 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 She does notice more swelling when she is on her feet for extended periods. She has changed her eating habits since last week and is eating more fruits and vegetables. She has a history of migraines and has had several in the past few weeks. She has decreased smoking to 2-3 cigs daily    ROS:   GI:   Nausea - No; Constipation - No;    Neuro:  Headache - No; Visual disturbances - No.    EXAM:   Vitals:  See prenatal flowsheet, /70, Wt no change   Abdomen:   See prenatal flowsheet, soft, nontender   Pelvic:  See prenatal flowsheet   Urine:  See prenatal flowsheet              Legs:               Varicosities and spider veins in lower legs    Lab Results   Component Value Date    ABO A 2018    RH Negative 2018    ABSCRN Negative 2018       MDM:  Impression: Supervision of low risk pregnancy  Benign edema in pregnancy  Varicosities and spider veins in legs.   Tests done today: none   Topics discussed: Off work remainder of pregnancy, compression stockings, Tylenol, increase water   Arise slowly from sitting or lying down.   Tests next visit: U/S to complete the anatomic screening                RTO:                      1 week as scheduled    This note was electronically signed.  LISSETH Slaughter  12/3/2018

## 2018-12-11 ENCOUNTER — ROUTINE PRENATAL (OUTPATIENT)
Dept: OBSTETRICS AND GYNECOLOGY | Facility: CLINIC | Age: 43
End: 2018-12-11

## 2018-12-11 VITALS — WEIGHT: 247 LBS | SYSTOLIC BLOOD PRESSURE: 128 MMHG | BODY MASS INDEX: 39.87 KG/M2 | DIASTOLIC BLOOD PRESSURE: 64 MMHG

## 2018-12-11 DIAGNOSIS — R60.0 BILATERAL LOWER EXTREMITY EDEMA: ICD-10-CM

## 2018-12-11 DIAGNOSIS — O09.92 ENCOUNTER FOR SUPERVISION OF HIGH RISK PREGNANCY IN SECOND TRIMESTER, ANTEPARTUM: ICD-10-CM

## 2018-12-11 DIAGNOSIS — O09.521 AMA (ADVANCED MATERNAL AGE) MULTIGRAVIDA 35+, FIRST TRIMESTER: ICD-10-CM

## 2018-12-11 DIAGNOSIS — IMO0002 EVALUATE ANATOMY NOT SEEN ON PRIOR SONOGRAM: Primary | ICD-10-CM

## 2018-12-11 PROCEDURE — 0502F SUBSEQUENT PRENATAL CARE: CPT | Performed by: OBSTETRICS & GYNECOLOGY

## 2018-12-11 NOTE — PROGRESS NOTES
Chief Complaint   Patient presents with   • Routine Prenatal Visit     follow up scan on heart, also follow up on swelling        HPI: Tika is a  currently at 21w5d who today reports the following:  Contractions - No; Leaking - No; Vaginal bleeding -  No; Heartburn - No.  Pt here for f/u of previous scan in which cardiac structures could not be adequately seen.  Pt also with ?2 vessel cord.  Pt has not had any further bleeding since October.  Pt had returned to work on .  Pt then had marked amount of swelling in left LE over .  Pt reports no changes in activity during that time.  Pt had been up on feet but no more than usual.  Pt had f/u here; sent for duplex doppler of left LE which was negative.  Pt then returned to work on  with worsening of symptoms with edema and patient now off work.  Pt reports today her edema has slightly improved.  Pt does have a history of gastric ulcers.  Pt has not been on any medications.  Pt denies any heartburn or reflux.  Pt has not been on baby ASA.  Pt with no further bleeding since October.  ROS:   GI:   Nausea - No; Constipation - No; Diarrhea - No.   Neuro:  Headache - No; Visual disturbances - No.    EXAM:   Vitals:  See prenatal flowsheet as noted and reviewed   Abdomen:   See prenatal flowsheet as noted and reviewed   Pelvic:  See prenatal flowsheet as noted and reviewed   LE:  2+ edema bilaterally L>>R; no Greg's or calf tenderness   Urine:  See prenatal flowsheet as noted and reviewed     Lab Results   Component Value Date    ABO A 2018    RH Negative 2018    ABSCRN Negative 2018     US Ob Follow Up Transabdominal Approach  Tika Pugh  : 1975  MRN: [id number]  Date: 2018    Reason for exam/History:  F/u heart and umbilical cord    Ultrasound images are reviewed.  There is noted to be a viable   intrauterine pregnancy.   The fetal heart rate was normal.  There was   noted to be a four chamber heart with normal  outflow tracts.  A two vessel   cord is noted.    The exam limitations noted:  none    See ultrasound report for measurements and structures identified.    Monse Wayne MD, RDMS  [facility]  OB GYN [place]        MDM:  Impression: Supervision of high risk pregnancy  2 vessel cord  AMA  Benign edema in pregnancy   History of gastric ulcers   Tests done today: U/S to complete the anatomic screening - anatomy completely seen today   Topics discussed: treatment of LE edema; pt to elevate feet, off feet as much as possible, consider compression stockings, low sodium diet, increase po fluids   Discussed consideration of baby ASA given history and marked edema with varicosities; pt instructed to start Zantac BID for several days then start baby ASA.  Pt to stop if any abdominal pain, nausea, bleeding, etc.  Pt to stay off work remainder of pregnancy given symptoms and findings  Pt also informed regarding 2 vessel cord and implications   Tests next visit: none     This note was electronically signed.  Monse Wayne M.D.

## 2019-01-04 ENCOUNTER — ROUTINE PRENATAL (OUTPATIENT)
Dept: OBSTETRICS AND GYNECOLOGY | Facility: CLINIC | Age: 44
End: 2019-01-04

## 2019-01-04 VITALS — DIASTOLIC BLOOD PRESSURE: 76 MMHG | BODY MASS INDEX: 40.84 KG/M2 | WEIGHT: 253 LBS | SYSTOLIC BLOOD PRESSURE: 120 MMHG

## 2019-01-04 DIAGNOSIS — Z34.02 ENCOUNTER FOR SUPERVISION OF NORMAL FIRST PREGNANCY IN SECOND TRIMESTER: ICD-10-CM

## 2019-01-04 DIAGNOSIS — O09.522 ELDERLY MULTIGRAVIDA IN SECOND TRIMESTER: Primary | ICD-10-CM

## 2019-01-04 PROCEDURE — 0502F SUBSEQUENT PRENATAL CARE: CPT | Performed by: MIDWIFE

## 2019-01-04 NOTE — PROGRESS NOTES
Chief Complaint   Patient presents with   • Routine Prenatal Visit     following up on swelling; C/O sinus pressure and congestion       HPI: Tika is a  currently at 25w1d who today reports the following:   Leaking - No; Heartburn - improved as compared to the prior visit. She is taking TUMS; Fetal movement - YES. Swelling is improved when she is at rest. She is no longer working. She hasn't found compression hose. She hasn't started taking low dose ASA. She is taking Zyrtec D for sinus congestion. Sinus drainage is clear. She denies fever.    ROS:   GI:   Nausea - No; Constipation - No;    Neuro:  Headache - No; Visual disturbances - No.    Social History    Tobacco Use      Smoking status: Current Every Day Smoker        Packs/day: 1.00        Types: Cigarettes      Smokeless tobacco: Never Used      EXAM:   Vitals:  See prenatal flowsheet as noted and reviewed /76, Wt +6#   Abdomen:   See prenatal flowsheet as noted and reviewed, soft, nontender   Pelvic:  See prenatal flowsheet as noted and reviewed   Urine:  See prenatal flowsheet as noted and reviewed    Lab Results   Component Value Date    ABO A 2018    RH Negative 2018    ABSCRN Negative 2018       MDM:  Impression: Supervision of high risk pregnancy  AMA  Benign edema in pregnancy  2 vessel cord   Tests done today: none   Topics discussed: kick counts and fetal movement  Low dose ASA   May also use saline nasal spray   Tests next visit: GCT  HgB   Rhogam                RTO:                        2 weeks    This note was electronically signed.  Cierra Kinney, APRN  2019

## 2019-01-09 ENCOUNTER — RESULTS ENCOUNTER (OUTPATIENT)
Dept: OBSTETRICS AND GYNECOLOGY | Facility: CLINIC | Age: 44
End: 2019-01-09

## 2019-01-09 ENCOUNTER — TELEPHONE (OUTPATIENT)
Dept: OBSTETRICS AND GYNECOLOGY | Facility: CLINIC | Age: 44
End: 2019-01-09

## 2019-01-09 DIAGNOSIS — Z34.02 ENCOUNTER FOR SUPERVISION OF NORMAL FIRST PREGNANCY IN SECOND TRIMESTER: ICD-10-CM

## 2019-01-09 RX ORDER — AZITHROMYCIN 250 MG/1
TABLET, FILM COATED ORAL
Qty: 6 TABLET | Refills: 0 | Status: SHIPPED | OUTPATIENT
Start: 2019-01-09 | End: 2019-02-04

## 2019-01-09 NOTE — TELEPHONE ENCOUNTER
----- Message from Alysha Riley sent at 1/9/2019 10:37 AM EST -----  Contact: PT  PT IS 25W6D AND CALLED STATING SHE HAS A BAD SINUS INFECTION WITH GREEN MUCOUS.  CAN WE SEND IN RX TO [facility]?  THANKS

## 2019-01-18 ENCOUNTER — ROUTINE PRENATAL (OUTPATIENT)
Dept: OBSTETRICS AND GYNECOLOGY | Facility: CLINIC | Age: 44
End: 2019-01-18

## 2019-01-18 VITALS — BODY MASS INDEX: 40.35 KG/M2 | DIASTOLIC BLOOD PRESSURE: 72 MMHG | WEIGHT: 250 LBS | SYSTOLIC BLOOD PRESSURE: 128 MMHG

## 2019-01-18 DIAGNOSIS — Z67.91 RH NEGATIVE STATUS DURING PREGNANCY IN SECOND TRIMESTER: ICD-10-CM

## 2019-01-18 DIAGNOSIS — O09.522 ELDERLY MULTIGRAVIDA IN SECOND TRIMESTER: ICD-10-CM

## 2019-01-18 DIAGNOSIS — G56.02 CARPAL TUNNEL SYNDROME OF LEFT WRIST: ICD-10-CM

## 2019-01-18 DIAGNOSIS — D25.9 UTERINE LEIOMYOMA, UNSPECIFIED LOCATION: ICD-10-CM

## 2019-01-18 DIAGNOSIS — Z34.92 PRENATAL CARE IN SECOND TRIMESTER: Primary | ICD-10-CM

## 2019-01-18 DIAGNOSIS — O26.892 RH NEGATIVE STATUS DURING PREGNANCY IN SECOND TRIMESTER: ICD-10-CM

## 2019-01-18 LAB
BASOPHILS # BLD AUTO: 0.03 10*3/MM3 (ref 0–0.2)
BASOPHILS NFR BLD AUTO: 0.3 % (ref 0–2.5)
EOSINOPHIL # BLD AUTO: 0.14 10*3/MM3 (ref 0–0.7)
EOSINOPHIL NFR BLD AUTO: 1.2 % (ref 0–7)
ERYTHROCYTE [DISTWIDTH] IN BLOOD BY AUTOMATED COUNT: 15.5 % (ref 11.5–14.5)
GLUCOSE 1H P 50 G GLC PO SERPL-MCNC: 103 MG/DL
HCT VFR BLD AUTO: 34.3 % (ref 37–47)
HGB BLD-MCNC: 11.1 G/DL (ref 12–16)
IMM GRANULOCYTES # BLD AUTO: 0.04 10*3/MM3 (ref 0–0.06)
IMM GRANULOCYTES NFR BLD AUTO: 0.3 % (ref 0–0.6)
LYMPHOCYTES # BLD AUTO: 1.38 10*3/MM3 (ref 0.6–3.4)
LYMPHOCYTES NFR BLD AUTO: 11.7 % (ref 10–50)
MCH RBC QN AUTO: 29.6 PG (ref 27–31)
MCHC RBC AUTO-ENTMCNC: 32.4 G/DL (ref 30–37)
MCV RBC AUTO: 91.5 FL (ref 81–99)
MONOCYTES # BLD AUTO: 0.48 10*3/MM3 (ref 0–0.9)
MONOCYTES NFR BLD AUTO: 4.1 % (ref 0–12)
NEUTROPHILS # BLD AUTO: 9.7 10*3/MM3 (ref 2–6.9)
NEUTROPHILS NFR BLD AUTO: 82.4 % (ref 37–80)
NRBC BLD AUTO-RTO: 0 /100 WBC (ref 0–0)
PLATELET # BLD AUTO: 182 10*3/MM3 (ref 130–400)
RBC # BLD AUTO: 3.75 10*6/MM3 (ref 4.2–5.4)
WBC # BLD AUTO: 11.77 10*3/MM3 (ref 4.8–10.8)

## 2019-01-18 PROCEDURE — 0502F SUBSEQUENT PRENATAL CARE: CPT | Performed by: OBSTETRICS & GYNECOLOGY

## 2019-01-18 NOTE — PROGRESS NOTES
Chief Complaint   Patient presents with   • Routine Prenatal Visit     Glucola done today, needs Rhogam at 28 weeks, C/O numbness in left arm and hand       HPI:   Tika is a  currently at 27w1d who today reports the following:  Contractions - No; Leaking - No; Vaginal bleeding -  No; Swelling of extremities - YES. Good fetal movement - YES.     Swelling worse in left leg, but present in both.    Numbness and tingling in left forearm and hand, worse in the AM    ROS:  GI: Nausea - No; Constipation - No; Diarrhea - No. RUQ pain - No    Neuro: Headache - No; Visual disturbances - No.    Pertinent items are noted in HPI, all other systems reviewed and negative    Review of History:  The following portions of the patient's history were reviewed and updated as appropriate:problem list, current medications, allergies, past family history, past medical history, past social history and past surgical history.    Current Outpatient Medications on File Prior to Visit   Medication Sig Dispense Refill   • azithromycin (ZITHROMAX) 250 MG tablet Take 2 tablets the first day, then 1 tablet daily for 4 days. 6 tablet 0   • Cetirizine-Pseudoephedrine (ZYRTEC-D PO) Take 10 mg by mouth Daily.     • Cyanocobalamin 1000 MCG/ML kit Inject  as directed.     • ferrous sulfate 325 (65 FE) MG tablet Take 1 tablet by mouth 2 (Two) Times a Day Before Meals. 60 tablet 6   • nicotine (NICODERM CQ) 14 MG/24HR patch Place 1 patch on the skin as directed by provider Daily. 30 patch 5   • vitamin B-12 (CYANOCOBALAMIN) 1000 MCG tablet Take 1,000 mcg by mouth Daily.     • [DISCONTINUED] cetirizine (zyrTEC) 10 MG tablet Take 10 mg by mouth Daily.       No current facility-administered medications on file prior to visit.        EXAM:  /72   Wt 113 kg (250 lb)   LMP  (LMP Unknown)   BMI 40.35 kg/m²     Gen: NAD, conversant  Pulm: No use of accessory muscles, normal respirations  Abdomen: Gravid, nontender, size = dates, + fetal cardiac  activity  Ext: no edema, no rashes, WWP  Gait: normal for pregnancy  Psych: Mood, insight, judgement intact  SVE: Not performed     Lab Results   Component Value Date    ABO A 08/28/2018    RH Negative 08/28/2018    ABSCRN Negative 08/28/2018       Social History    Tobacco Use      Smoking status: Current Every Day Smoker        Packs/day: 1.00        Types: Cigarettes      Smokeless tobacco: Never Used      I have reviewed the prenatal labs and previous ultrasounds today.    MDM:  Diagnosis: Supervision of low risk pregnancy  Rh negative  AMA  Tobacco use in pregnancy   Known leiomyoma, stable  Subchorionic hemorrhage, resolved  2 vessel cord  H/o profound B12 deficiency  Carpal tunnel syndrome   Tests/Orders/Rx today: Glucola + hgb/Plts today    Meds Ordered: none   Topics discussed: The above issues   Tests next visit: Rhogam   Next visit: 2 week(s)     Mark Nowak MD  Obstetrics and Gynecology  [facility]

## 2019-02-04 ENCOUNTER — ROUTINE PRENATAL (OUTPATIENT)
Dept: OBSTETRICS AND GYNECOLOGY | Facility: CLINIC | Age: 44
End: 2019-02-04

## 2019-02-04 VITALS — BODY MASS INDEX: 41.8 KG/M2 | DIASTOLIC BLOOD PRESSURE: 72 MMHG | WEIGHT: 259 LBS | SYSTOLIC BLOOD PRESSURE: 128 MMHG

## 2019-02-04 DIAGNOSIS — O09.522 ELDERLY MULTIGRAVIDA IN SECOND TRIMESTER: Primary | ICD-10-CM

## 2019-02-04 DIAGNOSIS — G56.02 CARPAL TUNNEL SYNDROME OF LEFT WRIST: ICD-10-CM

## 2019-02-04 PROCEDURE — 99214 OFFICE O/P EST MOD 30 MIN: CPT | Performed by: NURSE PRACTITIONER

## 2019-02-04 PROCEDURE — 96372 THER/PROPH/DIAG INJ SC/IM: CPT | Performed by: NURSE PRACTITIONER

## 2019-02-04 RX ORDER — AZITHROMYCIN 250 MG/1
TABLET, FILM COATED ORAL
Qty: 6 TABLET | Refills: 0 | Status: SHIPPED | OUTPATIENT
Start: 2019-02-04 | End: 2019-02-11

## 2019-02-04 RX ORDER — RANITIDINE 150 MG/1
150 TABLET ORAL 2 TIMES DAILY
Qty: 60 TABLET | Refills: 2 | Status: SHIPPED | OUTPATIENT
Start: 2019-02-04 | End: 2019-02-25

## 2019-02-04 NOTE — PATIENT INSTRUCTIONS
Heartburn During Pregnancy    Heartburn happens when stomach acid goes up into the esophagus. The esophagus is the tube between the mouth and the stomach. This acid causes a burning pain in the chest or throat. This happens more often in the later part of pregnancy because the womb (uterus) gets larger. It may also happen because of hormone changes. Heartburn problems often go away after giving birth.  HOME CARE  · Take all medicine as told by your doctor.  · Raise the head of your bed with blocks only as told by your doctor.  · Do not exercise right after eating.  · Avoid eating 2-3 hours before bed. Do not lie down right after eating.  · Eat small meals throughout the day instead of 3 large meals.  · Avoid foods that give you heartburn. Foods you may want to avoid include:  ¨ Peppers.  ¨ Chocolate.  ¨ High-fat foods, including fried foods.  ¨ Spicy foods.  ¨ Garlic and onions.  ¨ Citrus fruits, including oranges, grapefruit, jeramie, and limes.  ¨ Food containing tomatoes or tomato products.  ¨ Mint.  ¨ Bubbly (carbonated) drinks and drinks with caffeine.  ¨ Vinegar.  GET HELP IF:  · You have any belly (abdominal) pain.  · You feel burning in your upper belly or chest, especially after eating or lying down.  · You feel sick to your stomach (nauseous) and throw up (vomit).  · Your stomach feels upset after you eat.  GET HELP RIGHT AWAY IF:  · You have bad chest pain that goes down your arm or into your jaw or neck.  · You feel sweaty, dizzy, or light-headed.  · You have trouble breathing.  · You throw up blood.  · You have trouble or pain when swallowing.  · You have bloody or black poop (stool).  · You have heartburn more than 3 times a week, for more than 2 weeks.  MAKE SURE YOU:  · Understand these instructions.  · Will watch your condition.  · Will get help right away if you are not doing well or get worse.     This information is not intended to replace advice given to you by your health care provider. Make  sure you discuss any questions you have with your health care provider.

## 2019-02-04 NOTE — PROGRESS NOTES
[id number]  Chief Complaint   Patient presents with   • Routine Prenatal Visit     Rhogam given today, C/O congestion        HPI  , 29w4d reports C/O:  Sinusitis / rt ear pain / congestion/nasal drainage - takes zyrtec also  Continue to have carpal tunnel syndrome -   LE swelling the same - improves with rest     Started smoking maybe 1 pk / day - can't do patch now as lots of stress - family stress though getting better -   FOB and mother supportive   No longer working       ROS  /72   Wt 117 kg (259 lb)   LMP  (LMP Unknown)   BMI 41.80 kg/m²  -See Prenatal Assessment    ROS:      GI: Nausea - No; Constipation - No;    Diarrhea - No    Neuro: Headache - No; Visual change - No      EXAM  General Appearance:  Pleasant / tired   Lungs: Breathing unlabored - CTAB with several coughs   Abdomen:  See flow sheet for Fundal ht, FM, FHT's  LE: 1+ pretibial pitting edema    MDM  Impression:  Problems/Risk Pregnancy high risk   AMA  Obesity  Tobacco use in pregnancy  URI  Carpal tunnel syndrome   Stress - family related    Tests done today: none   Topics discussed: continue to note good FM  Tobacco use - importance to decrease / risk factors   z- pack - 5 day - otc meds - fever -chills to be seen   Adeq rest and fluids   Preventive measures of LE edema - if worsens to be seen  PIH precautions  Option for counseling for family issues - declined   encouraged questions - call prn    Tests next visit: U/S      OB History      Para Term  AB Living    1              SAB TAB Ectopic Molar Multiple Live Births                         Past Medical History:   Diagnosis Date   • Asthma    • Kidney stone    • Migraine    • Rh negative status during pregnancy 2018   • Urinary tract infection        Past Surgical History:   Procedure Laterality Date   • KNEE ACL RECONSTRUCTION         Family History   Problem Relation Age of Onset   • Diabetes Father    • Hypertension Father    • Stroke Father    • Cancer  Brother    • Heart disease Maternal Grandmother    • Stroke Paternal Grandmother        Social History     Socioeconomic History   • Marital status: Single     Spouse name: Not on file   • Number of children: Not on file   • Years of education: Not on file   • Highest education level: Not on file   Social Needs   • Financial resource strain: Not on file   • Food insecurity - worry: Not on file   • Food insecurity - inability: Not on file   • Transportation needs - medical: Not on file   • Transportation needs - non-medical: Not on file   Occupational History   • Not on file   Tobacco Use   • Smoking status: Current Every Day Smoker     Packs/day: 1.00     Types: Cigarettes   • Smokeless tobacco: Never Used   Substance and Sexual Activity   • Alcohol use: No     Comment: Social   • Drug use: No   • Sexual activity: Yes     Partners: Male   Other Topics Concern   • Not on file   Social History Narrative   • Not on file

## 2019-02-11 ENCOUNTER — ROUTINE PRENATAL (OUTPATIENT)
Dept: OBSTETRICS AND GYNECOLOGY | Facility: CLINIC | Age: 44
End: 2019-02-11

## 2019-02-11 ENCOUNTER — HOSPITAL ENCOUNTER (OUTPATIENT)
Facility: HOSPITAL | Age: 44
Discharge: HOME | End: 2019-02-11
Attending: MIDWIFE | Admitting: NURSE PRACTITIONER

## 2019-02-11 ENCOUNTER — APPOINTMENT (OUTPATIENT)
Dept: ULTRASOUND IMAGING | Facility: HOSPITAL | Age: 44
End: 2019-02-11

## 2019-02-11 VITALS — BODY MASS INDEX: 42.93 KG/M2 | SYSTOLIC BLOOD PRESSURE: 144 MMHG | WEIGHT: 266 LBS | DIASTOLIC BLOOD PRESSURE: 70 MMHG

## 2019-02-11 VITALS
HEIGHT: 67 IN | TEMPERATURE: 98.9 F | WEIGHT: 269 LBS | OXYGEN SATURATION: 100 % | BODY MASS INDEX: 42.22 KG/M2 | RESPIRATION RATE: 16 BRPM | SYSTOLIC BLOOD PRESSURE: 134 MMHG | DIASTOLIC BLOOD PRESSURE: 61 MMHG | HEART RATE: 78 BPM

## 2019-02-11 DIAGNOSIS — O09.523 ELDERLY MULTIGRAVIDA IN THIRD TRIMESTER: ICD-10-CM

## 2019-02-11 DIAGNOSIS — Z36.89 ENCOUNTER FOR ULTRASOUND TO CHECK FETAL GROWTH: Primary | ICD-10-CM

## 2019-02-11 DIAGNOSIS — R60.0 BILATERAL LOWER EXTREMITY EDEMA: Primary | ICD-10-CM

## 2019-02-11 LAB
ALBUMIN SERPL-MCNC: 3 G/DL (ref 3.5–5)
ALBUMIN/GLOB SERPL: 1 G/DL (ref 1–2)
ALP SERPL-CCNC: 108 U/L (ref 38–126)
ALT SERPL W P-5'-P-CCNC: 9 U/L (ref 13–69)
ANION GAP SERPL CALCULATED.3IONS-SCNC: 6.8 MMOL/L (ref 10–20)
AST SERPL-CCNC: 11 U/L (ref 15–46)
BACTERIA UR QL AUTO: ABNORMAL /HPF
BILIRUB BLD-MCNC: NEGATIVE MG/DL
BILIRUB SERPL-MCNC: 0.1 MG/DL (ref 0.2–1.3)
BILIRUB UR QL STRIP: NEGATIVE
BUN BLD-MCNC: 10 MG/DL (ref 7–20)
BUN/CREAT SERPL: 16.7 (ref 7.1–23.5)
CALCIUM SPEC-SCNC: 8.7 MG/DL (ref 8.4–10.2)
CHLORIDE SERPL-SCNC: 108 MMOL/L (ref 98–107)
CLARITY UR: CLEAR
CLARITY, POC: CLEAR
CO2 SERPL-SCNC: 24 MMOL/L (ref 26–30)
COLOR UR: YELLOW
COLOR UR: YELLOW
CREAT BLD-MCNC: 0.6 MG/DL (ref 0.6–1.3)
CREAT UR-MCNC: 34.2 MG/DL
DEPRECATED RDW RBC AUTO: 53.9 FL (ref 37–54)
EOSINOPHIL # BLD MANUAL: 0.34 10*3/MM3 (ref 0–0.7)
EOSINOPHIL NFR BLD MANUAL: 3 % (ref 0–7)
ERYTHROCYTE [DISTWIDTH] IN BLOOD BY AUTOMATED COUNT: 15.9 % (ref 11.5–14.5)
GFR SERPL CREATININE-BSD FRML MDRD: 110 ML/MIN/1.73
GLOBULIN UR ELPH-MCNC: 2.9 GM/DL
GLUCOSE BLD-MCNC: 74 MG/DL (ref 74–98)
GLUCOSE UR STRIP-MCNC: NEGATIVE MG/DL
GLUCOSE UR STRIP-MCNC: NEGATIVE MG/DL
HCT VFR BLD AUTO: 33 % (ref 37–47)
HGB BLD-MCNC: 10.9 G/DL (ref 12–16)
HGB UR QL STRIP.AUTO: ABNORMAL
HYALINE CASTS UR QL AUTO: ABNORMAL /LPF
KETONES UR QL STRIP: NEGATIVE
KETONES UR QL: NEGATIVE
LEUKOCYTE EST, POC: NEGATIVE
LEUKOCYTE ESTERASE UR QL STRIP.AUTO: NEGATIVE
LYMPHOCYTES # BLD MANUAL: 1.57 10*3/MM3 (ref 0.6–3.4)
LYMPHOCYTES NFR BLD MANUAL: 14 % (ref 10–50)
LYMPHOCYTES NFR BLD MANUAL: 2 % (ref 5–12)
MCH RBC QN AUTO: 30.9 PG (ref 27–31)
MCHC RBC AUTO-ENTMCNC: 33 G/DL (ref 30–37)
MCV RBC AUTO: 93.5 FL (ref 81–99)
MONOCYTES # BLD AUTO: 0.22 10*3/MM3 (ref 0–0.9)
NEUTROPHILS # BLD AUTO: 9.07 10*3/MM3 (ref 2–6.9)
NEUTROPHILS NFR BLD MANUAL: 79 % (ref 37–80)
NEUTS BAND NFR BLD MANUAL: 2 % (ref 0–6)
NITRITE UR QL STRIP: NEGATIVE
NITRITE UR-MCNC: NEGATIVE MG/ML
PH UR STRIP.AUTO: 7 [PH] (ref 5–8)
PH UR: 6 [PH] (ref 5–8)
PLAT MORPH BLD: NORMAL
PLATELET # BLD AUTO: 150 10*3/MM3 (ref 130–400)
PMV BLD AUTO: 10.7 FL (ref 6–12)
POTASSIUM BLD-SCNC: 3.8 MMOL/L (ref 3.5–5.1)
PROT SERPL-MCNC: 5.9 G/DL (ref 6.3–8.2)
PROT UR QL STRIP: ABNORMAL
PROT UR STRIP-MCNC: ABNORMAL MG/DL
PROT UR-MCNC: 138 MG/DL (ref 0–11.9)
PROT/CREAT UR: 4035.1 MG/G CREA
RBC # BLD AUTO: 3.53 10*6/MM3 (ref 4.2–5.4)
RBC # UR STRIP: ABNORMAL /UL
RBC # UR: ABNORMAL /HPF
RBC MORPH BLD: NORMAL
REF LAB TEST METHOD: ABNORMAL
SODIUM BLD-SCNC: 135 MMOL/L (ref 137–145)
SP GR UR STRIP: 1.01 (ref 1–1.03)
SP GR UR: 1.01 (ref 1–1.03)
SQUAMOUS #/AREA URNS HPF: ABNORMAL /HPF
UROBILINOGEN UR QL STRIP: ABNORMAL
UROBILINOGEN UR QL: NORMAL
WBC MORPH BLD: NORMAL
WBC NRBC COR # BLD: 11.2 10*3/MM3 (ref 4.8–10.8)
WBC UR QL AUTO: ABNORMAL /HPF

## 2019-02-11 PROCEDURE — 99213 OFFICE O/P EST LOW 20 MIN: CPT | Performed by: NURSE PRACTITIONER

## 2019-02-11 PROCEDURE — 93971 EXTREMITY STUDY: CPT

## 2019-02-11 PROCEDURE — 96372 THER/PROPH/DIAG INJ SC/IM: CPT

## 2019-02-11 PROCEDURE — 80053 COMPREHEN METABOLIC PANEL: CPT | Performed by: NURSE PRACTITIONER

## 2019-02-11 PROCEDURE — 84156 ASSAY OF PROTEIN URINE: CPT | Performed by: NURSE PRACTITIONER

## 2019-02-11 PROCEDURE — 81002 URINALYSIS NONAUTO W/O SCOPE: CPT | Performed by: MIDWIFE

## 2019-02-11 PROCEDURE — 59025 FETAL NON-STRESS TEST: CPT | Performed by: OBSTETRICS & GYNECOLOGY

## 2019-02-11 PROCEDURE — 82570 ASSAY OF URINE CREATININE: CPT | Performed by: NURSE PRACTITIONER

## 2019-02-11 PROCEDURE — 81001 URINALYSIS AUTO W/SCOPE: CPT | Performed by: OBSTETRICS & GYNECOLOGY

## 2019-02-11 PROCEDURE — 36415 COLL VENOUS BLD VENIPUNCTURE: CPT | Performed by: NURSE PRACTITIONER

## 2019-02-11 PROCEDURE — 25010000002 BETAMETHASONE ACET & SOD PHOS PER 4 MG: Performed by: OBSTETRICS & GYNECOLOGY

## 2019-02-11 PROCEDURE — 85007 BL SMEAR W/DIFF WBC COUNT: CPT | Performed by: NURSE PRACTITIONER

## 2019-02-11 PROCEDURE — G0463 HOSPITAL OUTPT CLINIC VISIT: HCPCS

## 2019-02-11 PROCEDURE — 85027 COMPLETE CBC AUTOMATED: CPT | Performed by: NURSE PRACTITIONER

## 2019-02-11 RX ORDER — PRENATAL VIT/IRON FUM/FOLIC AC 27MG-0.8MG
1 TABLET ORAL DAILY
COMMUNITY
End: 2019-04-25

## 2019-02-11 RX ORDER — BETAMETHASONE SODIUM PHOSPHATE AND BETAMETHASONE ACETATE 3; 3 MG/ML; MG/ML
12 INJECTION, SUSPENSION INTRA-ARTICULAR; INTRALESIONAL; INTRAMUSCULAR; SOFT TISSUE EVERY 24 HOURS
Status: DISCONTINUED | OUTPATIENT
Start: 2019-02-11 | End: 2019-02-11 | Stop reason: HOSPADM

## 2019-02-11 RX ADMIN — BETAMETHASONE ACETATE AND BETAMETHASONE SODIUM PHOSPHATE 12 MG: 3; 3 INJECTION, SUSPENSION INTRA-ARTICULAR; INTRALESIONAL; INTRAMUSCULAR; SOFT TISSUE at 20:08

## 2019-02-11 NOTE — PROGRESS NOTES
[id number]  Chief Complaint   Patient presents with   • Routine Prenatal Visit     growth scan today, c/o swelling, + blood and 4+ protein on urine dip         HPI  , 30w4d reports:  she is very tired.    has noticed increase LE swelling - she has a hx of migraines but denies h/a's, visual disturbances or RUQ pain today  She still has nasal congestion and runny nose.    She has good FM     With review of her hx - she does state she had a cystoscopy about 8 yrs ago due to urine was dark and + blood.   - does not recall any given reason      ROS  /70   Wt 121 kg (266 lb)   LMP  (LMP Unknown)   BMI 42.93 kg/m²  -See Prenatal Assessment    ROS:      GI: Nausea - No; Constipation - No;    Diarrhea - No    Neuro: Headache - No; Visual change - No      EXAM  General Appearance:  Pleasant / tired  Lungs: Breathing unlabored  Abdomen:  See flow sheet for Fundal ht, FM, FHT's  LE: 2+ pretibial pitting edema bilaterally  DTR's 2+ Neg clonus      MDM  Impression:  Problems/Risk Pregnancy with Active Problems(s) &/or Complication(s)  AMA  Obesity  mildly elevated BP   Proteinuria    Tests done today: to  for urine C/P ratio, CMP, CBC, serial BP's, EFM   U/S - see report -    Topics discussed: continue to note good FM  Preventive measures of LE edema  PIH precautions  to LH & RTO will be pending  evaluation  encouraged questions - call prn    Tests next visit: none            OB History      Para Term  AB Living    1              SAB TAB Ectopic Molar Multiple Live Births                         Past Medical History:   Diagnosis Date   • Asthma    • Kidney stone    • Migraine    • Rh negative status during pregnancy 2018   • Urinary tract infection        Past Surgical History:   Procedure Laterality Date   • KNEE ACL RECONSTRUCTION         Family History   Problem Relation Age of Onset   • Diabetes Father    • Hypertension Father    • Stroke Father    • Cancer Brother    • Heart disease Maternal  Grandmother    • Stroke Paternal Grandmother        Social History     Socioeconomic History   • Marital status: Single     Spouse name: Not on file   • Number of children: Not on file   • Years of education: Not on file   • Highest education level: Not on file   Social Needs   • Financial resource strain: Not on file   • Food insecurity - worry: Not on file   • Food insecurity - inability: Not on file   • Transportation needs - medical: Not on file   • Transportation needs - non-medical: Not on file   Occupational History   • Not on file   Tobacco Use   • Smoking status: Current Every Day Smoker     Packs/day: 1.00     Types: Cigarettes   • Smokeless tobacco: Never Used   Substance and Sexual Activity   • Alcohol use: No     Comment: Social   • Drug use: No   • Sexual activity: Yes     Partners: Male   Other Topics Concern   • Not on file   Social History Narrative   • Not on file

## 2019-02-11 NOTE — H&P
"  : 1975  MRN: [id number]  CSN: [id number]    History and Physical    Subjective   Tika Pugh is a 43 y.o. year old  with an Estimated Date of Delivery: 19 currently at 30w4d sent from the office for NST and serial BPs, labs..  She does have a history of headaches/migraines but states it is okay today. Her baby has been active. She does have swelling in her LLE and some pain but states it has always swelled more on the left side since early in the pregnancy. She states she has been at [facility] most of the day with her mother for a procedure.    She has not been recently examined.        Obstetric History       T0      L0     SAB0   TAB0   Ectopic0   Molar0   Multiple0   Live Births0       # Outcome Date GA Lbr Ap/2nd Weight Sex Delivery Anes PTL Lv   1 Current                 Past Medical History:   Diagnosis Date   • Asthma     As a child   • Kidney stone    • Migraine    • Rh negative status during pregnancy 2018   • Ulcer of abdomen wall (CMS/HCC)    • Urinary tract infection      Past Surgical History:   Procedure Laterality Date   • KNEE ACL RECONSTRUCTION       No current facility-administered medications for this encounter.     No Known Allergies  Social History    Tobacco Use      Smoking status: Current Every Day Smoker        Packs/day: 1.00        Types: Cigarettes      Smokeless tobacco: Never Used      Review of Systems     Respiratory ROS: no cough, shortness of breath, or wheezing  Cardiovascular ROS: no chest pain or dyspnea on exertion  Gastrointestinal ROS: no abdominal pain, change in bowel habits, or black or bloody stools  Genito-Urinary ROS: positive for - hematuria        Objective   /47   Pulse 72   Temp 98.9 °F (37.2 °C) (Oral)   Resp 16   Ht 170.2 cm (67\")   Wt 122 kg (269 lb)   LMP  (LMP Unknown)   SpO2 100%   BMI 42.13 kg/m²   General: well developed; well nourished  no acute distress   Abdomen: soft, non-tender; no " masses  gravid   FHT's: reassuring and category 1      Cervix: was not checked.   Presentation: cephalic   Contractions: none - external monitors used   Back: Not performed today     Prenatal Labs  Lab Results   Component Value Date    HGB 10.9 (L) 02/11/2019    HEPBSAG Negative 08/28/2018    ABSCRN Negative 08/28/2018    [id number] Non Reactive 08/28/2018    HEPCVIRUSABY 0.1 08/28/2018    URINECX Final report 08/28/2018       Current Labs Reviewed   CBC, CMP, UA, Urine culture and urine P/C ratio         Assessment   1. IUP at 30w4d  2. Increased swelling and pain in LLE  3. Proteinuria and hematuria           Plan   1. NST, serial BPs   2. CBC, CMP, Urine P/C ratio, UA with culture  3. Doppler studies LLE    Cierra Kinney CNM  2/11/2019  6:13 PM

## 2019-02-12 ENCOUNTER — HOSPITAL ENCOUNTER (OUTPATIENT)
Facility: HOSPITAL | Age: 44
Discharge: HOME | End: 2019-02-12
Attending: MIDWIFE | Admitting: NURSE PRACTITIONER

## 2019-02-12 VITALS
RESPIRATION RATE: 18 BRPM | OXYGEN SATURATION: 99 % | SYSTOLIC BLOOD PRESSURE: 130 MMHG | DIASTOLIC BLOOD PRESSURE: 60 MMHG | HEART RATE: 76 BPM | TEMPERATURE: 98.8 F

## 2019-02-12 PROCEDURE — 25010000002 BETAMETHASONE ACET & SOD PHOS PER 4 MG: Performed by: NURSE PRACTITIONER

## 2019-02-12 PROCEDURE — 96372 THER/PROPH/DIAG INJ SC/IM: CPT

## 2019-02-12 RX ORDER — BETAMETHASONE SODIUM PHOSPHATE AND BETAMETHASONE ACETATE 3; 3 MG/ML; MG/ML
12 INJECTION, SUSPENSION INTRA-ARTICULAR; INTRALESIONAL; INTRAMUSCULAR; SOFT TISSUE EVERY 24 HOURS
Status: DISCONTINUED | OUTPATIENT
Start: 2019-02-12 | End: 2019-02-12 | Stop reason: HOSPADM

## 2019-02-12 RX ORDER — BETAMETHASONE SODIUM PHOSPHATE AND BETAMETHASONE ACETATE 3; 3 MG/ML; MG/ML
12 INJECTION, SUSPENSION INTRA-ARTICULAR; INTRALESIONAL; INTRAMUSCULAR; SOFT TISSUE EVERY 24 HOURS
Status: DISCONTINUED | OUTPATIENT
Start: 2019-02-12 | End: 2019-02-12

## 2019-02-12 RX ADMIN — BETAMETHASONE ACETATE AND BETAMETHASONE SODIUM PHOSPHATE 12 MG: 3; 3 INJECTION, SUSPENSION INTRA-ARTICULAR; INTRALESIONAL; INTRAMUSCULAR; SOFT TISSUE at 21:18

## 2019-02-12 NOTE — NON STRESS TEST
Triage Note - Nursing Documentation  Labor and Delivery Admission Log    Tika Pugh  : 1975  MRN: [id number]  CSN: [id number]    Date in / Time in:  2019  Time in:     Date out / Time out:    Time out:     Nurse: Roula Jensen RN    Patient Info: She is a 43 y.o. year old  at 30w4d with an SOPHIE of 2019, by Ultrasound who was seen on the [facility].    Chief Complaint:   Chief Complaint   Patient presents with   • Elevated Blood Pressure     Sent from the office for elevated BPs.       Provider Instructions / Disposition: Dr. Nowak discussed plan of care with patient at bedside. Pt. Given 1st. Celestone with instructions to return for 2nd injection on 2019 between 2081-1029. Pt. Sent home with instructions for 24 hour urine and is to set up appointment with OBGYN on 2019 for blood pressure evaluation and to turn in 24 hour urine. Discharge teaching done at bedside and pt. Was able to verbalize instructions back.     Patient Active Problem List   Diagnosis   • Chronic nonintractable headache   • Gastroesophageal reflux disease   • Maternal tobacco use in first trimester   • Rh negative status during pregnancy   • Anemia due to vitamin B12 deficiency   • Uterine leiomyoma   • AMA (advanced maternal age) multigravida 35+   • Carpal tunnel syndrome of left wrist       NST Documentation (Only applicable > 32 weeks):

## 2019-02-12 NOTE — DISCHARGE INSTRUCTIONS
24 hour urine starts 2- void first urine of morning in toilet and flush. Pour second urine of day and following urine's in collection jug (KEEP URINE IN REFRIGERATOR) until 2- of time collection started. Come back to [facility] on 2- between 1800 and 2000 for second celestone. Follow up in OB office on 2- for Blood Pressure check and to turn in 24 hour urine.

## 2019-02-13 ENCOUNTER — ROUTINE PRENATAL (OUTPATIENT)
Dept: OBSTETRICS AND GYNECOLOGY | Facility: CLINIC | Age: 44
End: 2019-02-13

## 2019-02-13 VITALS — BODY MASS INDEX: 41.66 KG/M2 | SYSTOLIC BLOOD PRESSURE: 136 MMHG | DIASTOLIC BLOOD PRESSURE: 72 MMHG | WEIGHT: 266 LBS

## 2019-02-13 DIAGNOSIS — O12.13 GESTATIONAL PROTEINURIA IN THIRD TRIMESTER: Primary | ICD-10-CM

## 2019-02-13 DIAGNOSIS — O09.523 ELDERLY MULTIGRAVIDA IN THIRD TRIMESTER: ICD-10-CM

## 2019-02-13 LAB
PROT 24H UR-MRATE: 5184 MG/24HOURS (ref 42–225)
PROT UR-MCNC: 384 MG/DL (ref 0–11.9)

## 2019-02-13 PROCEDURE — 99213 OFFICE O/P EST LOW 20 MIN: CPT | Performed by: OBSTETRICS & GYNECOLOGY

## 2019-02-13 NOTE — NURSING NOTE
It was in the pt NST note on 2/11 that pt was to come back on 2/12 for celestone shot and to make an appointment for 2/13 to return urine for 24 hour urine test to see MD and to get NST  So I gave injection and let pt go home, pt will return tomorrow for NST.

## 2019-02-13 NOTE — PROGRESS NOTES
Chief Complaint   Patient presents with   • Routine Prenatal Visit     Dropped off 24 hour urine today; Had Celestone injection yesterday; C/O increased swelling; Large blood and protein in urine        HPI:   , 30w6d gestation reports above    ROS:  See Prenatal Episode/Flowsheet  /72   Wt 121 kg (266 lb)   LMP  (LMP Unknown)   BMI 41.66 kg/m²      EXAM:  EXTREMITIES:  No swelling-See Prenatal Episode/Flowsheet    ABDOMEN:  FHTs/Movement noted-See Prenatal Episode/Flowsheet    URINE GLUCOSE/PROTEIN:  See Prenatal Episode/Flowsheet    PELVIC EXAM:  See Prenatal Episode/Flowsheet  CV:  Lungs:    MDM:    Lab Results   Component Value Date    HGB 10.9 (L) 2019    RUBELLAABIGG 12.50 2018    HEPBSAG Negative 2018    ABO A 2018    RH Negative 2018    ABSCRN Negative 2018    [id number] Non Reactive 2018    HEPCVIRUSABY 0.1 2018    URINECX Final report 2018       U/S:    1. IUP 30w6d  2. Routine care   3. Sig proteinuria with edema and mildly eleavted Bp--just turned in 24 urpot--PDC scan this week   -Lovenox 40mg subq daily proph

## 2019-02-14 ENCOUNTER — OFFICE VISIT (OUTPATIENT)
Dept: OBSTETRICS AND GYNECOLOGY | Facility: HOSPITAL | Age: 44
End: 2019-02-14

## 2019-02-14 ENCOUNTER — HOSPITAL ENCOUNTER (OUTPATIENT)
Dept: WOMENS IMAGING | Facility: HOSPITAL | Age: 44
Discharge: HOME | End: 2019-02-14
Admitting: OBSTETRICS & GYNECOLOGY

## 2019-02-14 VITALS
HEIGHT: 65 IN | SYSTOLIC BLOOD PRESSURE: 142 MMHG | WEIGHT: 268 LBS | DIASTOLIC BLOOD PRESSURE: 74 MMHG | BODY MASS INDEX: 44.65 KG/M2

## 2019-02-14 DIAGNOSIS — O09.523 ELDERLY MULTIGRAVIDA IN THIRD TRIMESTER: ICD-10-CM

## 2019-02-14 DIAGNOSIS — O12.13 GESTATIONAL PROTEINURIA IN THIRD TRIMESTER: ICD-10-CM

## 2019-02-14 DIAGNOSIS — E66.01 MORBID OBESITY WITH BMI OF 45.0-49.9, ADULT (HCC): ICD-10-CM

## 2019-02-14 DIAGNOSIS — O12.13 PROTEINURIA AFFECTING PREGNANCY IN THIRD TRIMESTER: ICD-10-CM

## 2019-02-14 DIAGNOSIS — O09.523 ELDERLY MULTIGRAVIDA IN THIRD TRIMESTER: Primary | ICD-10-CM

## 2019-02-14 DIAGNOSIS — O36.5930 POOR FETAL GROWTH AFFECTING MANAGEMENT OF MOTHER IN THIRD TRIMESTER, SINGLE OR UNSPECIFIED FETUS: ICD-10-CM

## 2019-02-14 DIAGNOSIS — Q27.0 SINGLE UMBILICAL ARTERY: ICD-10-CM

## 2019-02-14 PROCEDURE — 76811 OB US DETAILED SNGL FETUS: CPT

## 2019-02-14 PROCEDURE — 76811 OB US DETAILED SNGL FETUS: CPT | Performed by: OBSTETRICS & GYNECOLOGY

## 2019-02-14 RX ORDER — ACETAMINOPHEN 325 MG/1
650 TABLET ORAL EVERY 6 HOURS PRN
COMMUNITY
End: 2019-04-25

## 2019-02-14 NOTE — PROGRESS NOTES
"Pt denies all s/s PTL.  Reports \"was put on lovenox as a precaution because of my swelling and protein in my urine but I haven't picked it up yet.\".  Next OB appt 02/18/19.  \"Was monitored in local  because of high protein in my urine and I got steroids for the baby's lungs.\"  Informaseq -neg, female.  Reports \"slight headache but denies vision changes or epigastric pain.\"  "

## 2019-02-18 ENCOUNTER — ROUTINE PRENATAL (OUTPATIENT)
Dept: OBSTETRICS AND GYNECOLOGY | Facility: CLINIC | Age: 44
End: 2019-02-18

## 2019-02-18 VITALS — WEIGHT: 272.8 LBS | DIASTOLIC BLOOD PRESSURE: 76 MMHG | SYSTOLIC BLOOD PRESSURE: 130 MMHG | BODY MASS INDEX: 45.4 KG/M2

## 2019-02-18 DIAGNOSIS — O14.03 MILD PRE-ECLAMPSIA IN THIRD TRIMESTER: Primary | ICD-10-CM

## 2019-02-18 PROCEDURE — 99213 OFFICE O/P EST LOW 20 MIN: CPT | Performed by: OBSTETRICS & GYNECOLOGY

## 2019-02-18 RX ORDER — AMOXICILLIN AND CLAVULANATE POTASSIUM 875; 125 MG/1; MG/1
1 TABLET, FILM COATED ORAL 2 TIMES DAILY
Qty: 20 TABLET | Refills: 0 | Status: SHIPPED | OUTPATIENT
Start: 2019-02-18 | End: 2019-02-28

## 2019-02-18 NOTE — PROGRESS NOTES
Chief Complaint   Patient presents with   • Pregnancy Ultrasound     BPP done today, C/O swelling        HPI:   , 31w4d gestation reports normal PDC scan--relates per report prbable mild preeclampsia    ROS:  See Prenatal Episode/Flowsheet  /76   Wt 124 kg (272 lb 12.8 oz)   LMP  (LMP Unknown)   BMI 45.40 kg/m²      EXAM:  EXTREMITIES:  No swelling-See Prenatal Episode/Flowsheet    ABDOMEN:  FHTs/Movement noted-See Prenatal Episode/Flowsheet    URINE GLUCOSE/PROTEIN:  See Prenatal Episode/Flowsheet    PELVIC EXAM:  See Prenatal Episode/Flowsheet  CV:  Lungs:    MDM:    Lab Results   Component Value Date    HGB 10.9 (L) 2019    RUBELLAABIGG 12.50 2018    HEPBSAG Negative 2018    ABO A 2018    RH Negative 2018    ABSCRN Negative 2018    UMP3ZDU2 Non Reactive 2018    HEPCVIRUSABY 0.1 2018    URINECX Final report 2018       U/S: BPP 8/8, Vertex, active    1. IUP 31w4d  2. Routine care   3. Mild preeclampsia per PDC: normal PDC growth, normal BPP today   2x weekly testing, s/p steroids, goal is to get her to 37 weeks   BR, low soidum   No lovenox  4. URI: AUgmentin 875mg po BID x 10 days

## 2019-02-18 NOTE — PROGRESS NOTES
Telephone triage: Pt came to  for 2nd Celestone injection.  She had increased swelling, hematuria and proteinuria.   @ 31w4d.  DC to home, keep scheduled appt.

## 2019-02-21 ENCOUNTER — HOSPITAL ENCOUNTER (OUTPATIENT)
Dept: LABOR AND DELIVERY | Facility: HOSPITAL | Age: 44
Discharge: HOME | End: 2019-02-21

## 2019-02-21 ENCOUNTER — HOSPITAL ENCOUNTER (OUTPATIENT)
Facility: HOSPITAL | Age: 44
Discharge: HOME | End: 2019-02-21
Attending: MIDWIFE | Admitting: NURSE PRACTITIONER

## 2019-02-21 VITALS — OXYGEN SATURATION: 100 % | DIASTOLIC BLOOD PRESSURE: 66 MMHG | HEART RATE: 82 BPM | SYSTOLIC BLOOD PRESSURE: 129 MMHG

## 2019-02-21 LAB
ALT SERPL W P-5'-P-CCNC: 9 U/L (ref 13–69)
AMORPH URATE CRY URNS QL MICRO: ABNORMAL /HPF
AST SERPL-CCNC: 13 U/L (ref 15–46)
BACTERIA UR QL AUTO: ABNORMAL /HPF
BASOPHILS # BLD AUTO: 0.03 10*3/MM3 (ref 0–0.2)
BASOPHILS NFR BLD AUTO: 0.3 % (ref 0–2.5)
BILIRUB BLD-MCNC: NEGATIVE MG/DL
BILIRUB UR QL STRIP: NEGATIVE
CLARITY UR: CLEAR
CLARITY, POC: CLEAR
COLOR UR: YELLOW
COLOR UR: YELLOW
CREAT BLD-MCNC: 0.6 MG/DL (ref 0.6–1.3)
CREAT UR-MCNC: 122.6 MG/DL
DEPRECATED RDW RBC AUTO: 57.3 FL (ref 37–54)
EOSINOPHIL # BLD AUTO: 0.15 10*3/MM3 (ref 0–0.7)
EOSINOPHIL NFR BLD AUTO: 1.3 % (ref 0–7)
ERYTHROCYTE [DISTWIDTH] IN BLOOD BY AUTOMATED COUNT: 16.5 % (ref 11.5–14.5)
GFR SERPL CREATININE-BSD FRML MDRD: 110 ML/MIN/1.73
GLUCOSE UR STRIP-MCNC: NEGATIVE MG/DL
GLUCOSE UR STRIP-MCNC: NEGATIVE MG/DL
HCT VFR BLD AUTO: 32.5 % (ref 37–47)
HGB BLD-MCNC: 10.6 G/DL (ref 12–16)
HGB UR QL STRIP.AUTO: ABNORMAL
HYALINE CASTS UR QL AUTO: ABNORMAL /LPF
IMM GRANULOCYTES # BLD AUTO: 0.04 10*3/MM3 (ref 0–0.06)
IMM GRANULOCYTES NFR BLD AUTO: 0.4 % (ref 0–0.6)
KETONES UR QL STRIP: NEGATIVE
KETONES UR QL: NEGATIVE
LEUKOCYTE EST, POC: NEGATIVE
LEUKOCYTE ESTERASE UR QL STRIP.AUTO: NEGATIVE
LYMPHOCYTES # BLD AUTO: 1.69 10*3/MM3 (ref 0.6–3.4)
LYMPHOCYTES NFR BLD AUTO: 14.9 % (ref 10–50)
MCH RBC QN AUTO: 31.1 PG (ref 27–31)
MCHC RBC AUTO-ENTMCNC: 32.6 G/DL (ref 30–37)
MCV RBC AUTO: 95.3 FL (ref 81–99)
MONOCYTES # BLD AUTO: 0.61 10*3/MM3 (ref 0–0.9)
MONOCYTES NFR BLD AUTO: 5.4 % (ref 0–12)
MUCOUS THREADS URNS QL MICRO: ABNORMAL /HPF
NEUTROPHILS # BLD AUTO: 8.81 10*3/MM3 (ref 2–6.9)
NEUTROPHILS NFR BLD AUTO: 77.7 % (ref 37–80)
NITRITE UR QL STRIP: NEGATIVE
NITRITE UR-MCNC: NEGATIVE MG/ML
NRBC BLD AUTO-RTO: 0 /100 WBC (ref 0–0)
PH UR STRIP.AUTO: 6.5 [PH] (ref 5–8)
PH UR: 6.5 [PH] (ref 5–8)
PLATELET # BLD AUTO: 135 10*3/MM3 (ref 130–400)
PMV BLD AUTO: 11 FL (ref 6–12)
PROT UR QL STRIP: ABNORMAL
PROT UR STRIP-MCNC: ABNORMAL MG/DL
PROT UR-MCNC: 997 MG/DL (ref 0–11.9)
PROT/CREAT UR: 8132.1 MG/G CREA
RBC # BLD AUTO: 3.41 10*6/MM3 (ref 4.2–5.4)
RBC # UR STRIP: ABNORMAL /UL
RBC # UR: ABNORMAL /HPF
REF LAB TEST METHOD: ABNORMAL
SP GR UR STRIP: >=1.03 (ref 1–1.03)
SP GR UR: 1.03 (ref 1–1.03)
SQUAMOUS #/AREA URNS HPF: ABNORMAL /HPF
URATE SERPL-MCNC: 5 MG/DL (ref 2.5–8.5)
UROBILINOGEN UR QL STRIP: ABNORMAL
UROBILINOGEN UR QL: NORMAL
WBC NRBC COR # BLD: 11.33 10*3/MM3 (ref 4.8–10.8)
WBC UR QL AUTO: ABNORMAL /HPF

## 2019-02-21 PROCEDURE — 87086 URINE CULTURE/COLONY COUNT: CPT | Performed by: NURSE PRACTITIONER

## 2019-02-21 PROCEDURE — 84450 TRANSFERASE (AST) (SGOT): CPT | Performed by: NURSE PRACTITIONER

## 2019-02-21 PROCEDURE — 85025 COMPLETE CBC W/AUTO DIFF WBC: CPT | Performed by: NURSE PRACTITIONER

## 2019-02-21 PROCEDURE — 82565 ASSAY OF CREATININE: CPT | Performed by: NURSE PRACTITIONER

## 2019-02-21 PROCEDURE — 36415 COLL VENOUS BLD VENIPUNCTURE: CPT | Performed by: NURSE PRACTITIONER

## 2019-02-21 PROCEDURE — 84156 ASSAY OF PROTEIN URINE: CPT | Performed by: NURSE PRACTITIONER

## 2019-02-21 PROCEDURE — 81001 URINALYSIS AUTO W/SCOPE: CPT | Performed by: NURSE PRACTITIONER

## 2019-02-21 PROCEDURE — 84550 ASSAY OF BLOOD/URIC ACID: CPT | Performed by: NURSE PRACTITIONER

## 2019-02-21 PROCEDURE — 81002 URINALYSIS NONAUTO W/O SCOPE: CPT | Performed by: NURSE PRACTITIONER

## 2019-02-21 PROCEDURE — 84460 ALANINE AMINO (ALT) (SGPT): CPT | Performed by: NURSE PRACTITIONER

## 2019-02-21 PROCEDURE — 59025 FETAL NON-STRESS TEST: CPT | Performed by: NURSE PRACTITIONER

## 2019-02-21 PROCEDURE — 59025 FETAL NON-STRESS TEST: CPT

## 2019-02-21 PROCEDURE — 82570 ASSAY OF URINE CREATININE: CPT | Performed by: NURSE PRACTITIONER

## 2019-02-21 PROCEDURE — G0463 HOSPITAL OUTPT CLINIC VISIT: HCPCS

## 2019-02-21 PROCEDURE — 99214 OFFICE O/P EST MOD 30 MIN: CPT | Performed by: NURSE PRACTITIONER

## 2019-02-21 RX ORDER — ALUMINA, MAGNESIA, AND SIMETHICONE 2400; 2400; 240 MG/30ML; MG/30ML; MG/30ML
15 SUSPENSION ORAL EVERY 6 HOURS PRN
Status: DISCONTINUED | OUTPATIENT
Start: 2019-02-21 | End: 2019-02-21 | Stop reason: HOSPADM

## 2019-02-21 RX ADMIN — ALUMINUM HYDROXIDE, MAGNESIUM HYDROXIDE, AND DIMETHICONE 15 ML: 400; 400; 40 SUSPENSION ORAL at 18:34

## 2019-02-21 NOTE — H&P
Jaron  Tika Pugh  : 1975  MRN: [id number]  CSN: [id number]    Chief Complain:  here for NST     History and Physical    Tika Pugh is a 43 y.o. year old  with an Estimated Date of Delivery: 19 currently at 32w0d presenting for NST.    BP on arrival 146/81 and remained at rest 140-148/62-81.   RN reported she had large blood and 3+ protein in urine  ?1 beat clonus  She does get migraine h/a's with blurred vision and spots - none at this time - last episode 1 wk ago  She has severe acid reflux and zantac no help - not feeling it at present   She does have good FM   She stated her LE is the same as usual - possible a little more as basement flooded last evening....ok now    Prenatal care has been with [facility].   Prenatal course has been complicated by:   AMA, hx of chronic migraines with visual disturbances, GERD, proteinuria, tobacco use,    URI - tx'd with 2 z-pack and now on augmentin  Earlier in pregnancy she did state she had hx of abnormal urine results - had a cystoscopy though never knew what the problem was     With chart review    urine specimens:  24 hr u/a protein 5200 8 days ago  Creatinine .60  BUN 10  LFT's normal & platelets 150          Obstetric History       T0      L0     SAB0   TAB0   Ectopic0   Molar0   Multiple0   Live Births0       # Outcome Date GA Lbr Ap/2nd Weight Sex Delivery Anes PTL Lv   1 Current                 Past Medical History:   Diagnosis Date   • Asthma     As a child   • Carpal tunnel syndrome during pregnancy    • Frequent UTI    • Heart burn    • Hx of gastric ulcer    • Kidney stone    • Migraine    • Rh negative status during pregnancy 2018   • Ulcer of abdomen wall (CMS/HCC)    • Urinary tract infection      Past Surgical History:   Procedure Laterality Date   • KNEE ACL RECONSTRUCTION Left        Review of Systems        Pertinent items are noted in HPI, all other systems reviewed and negative  No Known  Allergies  Social History    Tobacco Use      Smoking status: Current Every Day Smoker        Packs/day: 0.50        Types: Cigarettes      Smokeless tobacco: Never Used      LMP  (LMP Unknown)     Physical Exam       Psych: Altert and oriented to time, place and person  Mood and affect appropriate   General: well developed; well nourished  no acute distress  Head: normocephalic  Heart: regular rate and rhythm, no murmur  Lungs:  breathing is unlabored  clear to auscultation bilaterally  Abdomen: Gravid - soft and non-tender   no contractions  FHT's   Lower Extremities: 3+ pretibial pitting edema, DTR's 2+ Borderline /?? 1 beat clonus   V/E: Deferred                              Prenatal Labs  Lab Results   Component Value Date    HGB 10.9 (L) 02/11/2019    HEPBSAG Negative 08/28/2018    ABSCRN Negative 08/28/2018    [id number] Non Reactive 08/28/2018    HEPCVIRUSABY 0.1 08/28/2018    URINECX Final report 08/28/2018       Current Labs Reviewed       Assessment:  High Risk     1. IUP at 32w0d  2. AMA  3. Proteinuria  4. Mildly elevated BP's with prominent LE pitting edema, DTR's 2+    5. + smoker    6. FHT's reassuring         Plan:  Consult with Dr. Wayne - plan pt may be d/c'd home / complete bed rest - PIH precautions - 24 hr u/a Sun 0600 and bring in Monday   Call to RN to report - noted BP last taken 152/70  Rev'd - FHT's BL uncertain at present  In to see pt - discussed watching over night - states she would feel better home & thinks being here for now would cause more stress than help. She denies any problems than the acid reflux she has been having through the pregnancy.  FOB is here and supportive.  Pt states she understands to return if edema without improvement/ worsens - PIH precautions she would return.  Will try to decrease smoking.  Understands kick counts.     FHT's BL at 145 now + accels   Will do PIH labs and if FHT's reassuring - will d/c home.                Abimbola Jeffers CNM  2/21/2019  4:45  PM

## 2019-02-22 NOTE — DISCHARGE INSTRUCTIONS
PLEASE DECREASE SODIUM INTAKE, INCREASE WATER INTAKE, ELEVATE LEGS AND DECREASE SMOKING.   Start 24 hour urine collection at 0800 Friday morning 2/22/19 until 0800 Saturday morning 02/23/19 saving all urine for collection.   Return 24 hour urine to outpatient lab at [facility].  Return to labor/delivery anytime Saturday 2/23/19 for non-stress test and serial blood pressure checks.

## 2019-02-22 NOTE — NON STRESS TEST
Triage Note - Nursing Documentation  Labor and Delivery Admission Log    Tika Pugh  : 1975  MRN: [id number]  CSN: [id number]    Date in / Time in:  2019  Time in:     Date out / Time out:    Time out:     Nurse: Armani Reese RN    Patient Info: She is a 43 y.o. year old  at 32w0d with an SOPHIE of 2019, by Ultrasound who was seen on the [facility].    Chief Complaint:   Chief Complaint   Patient presents with   • Non-stress Test     mild pre-eclampsia       Provider Instructions / Disposition:UA, CBC, Chemistry Panel, Urine-Creatinine Protein, serial blood pressures. Home with instructions for 24 hour urine collection. NST on 19. Preeclampsia precautions given.     Patient Active Problem List   Diagnosis   • Chronic nonintractable headache   • Gastroesophageal reflux disease   • Maternal tobacco use in first trimester   • Rh negative status during pregnancy   • Anemia due to vitamin B12 deficiency   • Uterine leiomyoma   • AMA (advanced maternal age) multigravida 35+   • Carpal tunnel syndrome of left wrist   • Proteinuria affecting pregnancy in third trimester   • Single umbilical artery   • Poor fetal growth affecting management of mother in third trimester   • Morbid obesity with BMI of 45.0-49.9, adult (CMS/[facility])       NST Documentation (Only applicable > 32 weeks):

## 2019-02-23 ENCOUNTER — HOSPITAL ENCOUNTER (OUTPATIENT)
Dept: LABOR AND DELIVERY | Facility: HOSPITAL | Age: 44
Discharge: HOME | End: 2019-02-23

## 2019-02-23 ENCOUNTER — HOSPITAL ENCOUNTER (OUTPATIENT)
Facility: HOSPITAL | Age: 44
Discharge: HOME | End: 2019-02-23
Attending: OBSTETRICS & GYNECOLOGY | Admitting: OBSTETRICS & GYNECOLOGY

## 2019-02-23 VITALS
TEMPERATURE: 98.3 F | HEART RATE: 76 BPM | DIASTOLIC BLOOD PRESSURE: 69 MMHG | SYSTOLIC BLOOD PRESSURE: 125 MMHG | BODY MASS INDEX: 44.76 KG/M2 | OXYGEN SATURATION: 99 % | WEIGHT: 269 LBS | RESPIRATION RATE: 16 BRPM

## 2019-02-23 LAB
BACTERIA SPEC AEROBE CULT: NO GROWTH
BILIRUB BLD-MCNC: NEGATIVE MG/DL
CLARITY, POC: CLEAR
COLLECT DURATION TIME UR: 24 HRS
COLOR UR: YELLOW
GLUCOSE UR STRIP-MCNC: NEGATIVE MG/DL
KETONES UR QL: NEGATIVE
LEUKOCYTE EST, POC: NEGATIVE
NITRITE UR-MCNC: NEGATIVE MG/ML
PH UR: 6.5 [PH] (ref 5–8)
PROT 24H UR-MRATE: ABNORMAL MG/24HOURS (ref 42–225)
PROT UR STRIP-MCNC: ABNORMAL MG/DL
PROT UR-MCNC: 387 MG/DL (ref 0–11.9)
RBC # UR STRIP: ABNORMAL /UL
SP GR UR: 1.01 (ref 1–1.03)
SPECIMEN VOL 24H UR: 2780 ML
UROBILINOGEN UR QL: NORMAL

## 2019-02-23 PROCEDURE — 81002 URINALYSIS NONAUTO W/O SCOPE: CPT | Performed by: OBSTETRICS & GYNECOLOGY

## 2019-02-23 PROCEDURE — 81050 URINALYSIS VOLUME MEASURE: CPT | Performed by: OBSTETRICS & GYNECOLOGY

## 2019-02-23 PROCEDURE — G0463 HOSPITAL OUTPT CLINIC VISIT: HCPCS

## 2019-02-23 PROCEDURE — 59025 FETAL NON-STRESS TEST: CPT

## 2019-02-23 PROCEDURE — 84156 ASSAY OF PROTEIN URINE: CPT | Performed by: OBSTETRICS & GYNECOLOGY

## 2019-02-23 NOTE — NON STRESS TEST
Triage Note - Nursing Documentation  Labor and Delivery Admission Log    Tika Pugh  : 1975  MRN: [id number]  CSN: [id number]    Date in / Time in:  2019  Time in: 904  Date out / Time out:    Time out: 1013    Nurse: Riddhi Díaz RN    Patient Info: She is a 43 y.o. year old  at 32w2d with an SPOHIE of 2019, by Ultrasound who was seen on the [facility].    Chief Complaint:   Chief Complaint   Patient presents with   • Non-stress Test     hypertension       Provider Instructions / Disposition: discharged home. Follow up on Monday as scheduled.    Patient Active Problem List   Diagnosis   • Chronic nonintractable headache   • Gastroesophageal reflux disease   • Maternal tobacco use in first trimester   • Rh negative status during pregnancy   • Anemia due to vitamin B12 deficiency   • Uterine leiomyoma   • AMA (advanced maternal age) multigravida 35+   • Carpal tunnel syndrome of left wrist   • Proteinuria affecting pregnancy in third trimester   • Single umbilical artery   • Poor fetal growth affecting management of mother in third trimester   • Morbid obesity with BMI of 45.0-49.9, adult (CMS/[facility])       NST Documentation (Only applicable > 32 weeks): Interpretation A  Nonstress Test Interpretation A: Reactive (19 1008 : Riddhi Díaz, RN)

## 2019-02-25 ENCOUNTER — ROUTINE PRENATAL (OUTPATIENT)
Dept: OBSTETRICS AND GYNECOLOGY | Facility: CLINIC | Age: 44
End: 2019-02-25

## 2019-02-25 VITALS — DIASTOLIC BLOOD PRESSURE: 74 MMHG | SYSTOLIC BLOOD PRESSURE: 138 MMHG | WEIGHT: 273 LBS | BODY MASS INDEX: 45.43 KG/M2

## 2019-02-25 DIAGNOSIS — O09.93 ENCOUNTER FOR SUPERVISION OF HIGH RISK PREGNANCY IN THIRD TRIMESTER, ANTEPARTUM: Primary | ICD-10-CM

## 2019-02-25 DIAGNOSIS — O36.5930 POOR FETAL GROWTH AFFECTING MANAGEMENT OF MOTHER IN THIRD TRIMESTER, SINGLE OR UNSPECIFIED FETUS: ICD-10-CM

## 2019-02-25 DIAGNOSIS — O09.521 AMA (ADVANCED MATERNAL AGE) MULTIGRAVIDA 35+, FIRST TRIMESTER: ICD-10-CM

## 2019-02-25 PROCEDURE — 99213 OFFICE O/P EST LOW 20 MIN: CPT | Performed by: OBSTETRICS & GYNECOLOGY

## 2019-02-25 RX ORDER — PANTOPRAZOLE SODIUM 40 MG/1
40 TABLET, DELAYED RELEASE ORAL DAILY
Qty: 30 TABLET | Refills: 3 | Status: SHIPPED | OUTPATIENT
Start: 2019-02-25 | End: 2019-04-01

## 2019-02-25 NOTE — PROGRESS NOTES
Chief Complaint   Patient presents with   • Routine Prenatal Visit   • Edema       HPI: Tika is a  currently at 32w4d who today reports the following:  Contractions - No; Leaking - No; Vaginal bleeding -  No; Heartburn - No.  Pt without complaints other than continued sinus congestion; pt still on Augmentin at present.  Pt denies any headaches at present; pt does have history of migraines.  Pt denies any visual changes, epigastric pain.  Pt with marked LE edema.  Pt has been on bedrest.  Pt seen on labor vee Saturday.  Pt returned 24 hr urine.  Pt gives history of proteinuria prior to pregnancy.  Pt has an appt with PDC in 2 weeks.  ROS:   GI:   Nausea - No; Constipation - No; Diarrhea - No.   Neuro:  Headache - No; Visual disturbances - No.    EXAM:   Vitals:  See prenatal flowsheet as noted and reviewed   Abdomen:   See prenatal flowsheet as noted and reviewed   Pelvic:  See prenatal flowsheet as noted and reviewed   Urine:  See prenatal flowsheet as noted and reviewed     Lab Results   Component Value Date    ABO A 2018    RH Negative 2018    ABSCRN Negative 2018     US Fetal Biophysical Profile;Without Non-Stress Testing  .Tika Pugh  : 1975  MRN: [id number]  Date: 2019    Reason for exam/History:  IUGR    Ultrasound images are reviewed.  There is a single viable intrauterine   pregnancy noted. The fetus was in the vertex presentation.  The fetal   heart rate was normal.      The biophysical profile was 8/8:  2 points for fetal breathing movements,   2 points for fetal tone, 2 points for amniotic fluid volume, and 2 points   for fetal movement.  The CAM was 13.68 cms.    See official report for measurements and structures identified.    Monse Wayne MD, [facility]  OB GYN [place]    Labs:  24 hr urine  10,758 mg/24 hr;  5,184 mg/24 hr  MDM:  Impression: Supervision of high risk pregnancy  History of IUGR  marked LE edema with worsening  proteinuria  AMA   Tests done today: BPP -    Topics discussed: kick counts and fetal movement  PIH precautions   labor signs and symptoms   Refer to PDC given above; instructions and precautions given   Tests next visit: none     This note was electronically signed.  Monse Wayne M.D.

## 2019-02-26 ENCOUNTER — HOSPITAL ENCOUNTER (OUTPATIENT)
Dept: WOMENS IMAGING | Facility: HOSPITAL | Age: 44
Discharge: HOME | End: 2019-02-26
Admitting: OBSTETRICS & GYNECOLOGY

## 2019-02-26 ENCOUNTER — OFFICE VISIT (OUTPATIENT)
Dept: OBSTETRICS AND GYNECOLOGY | Facility: HOSPITAL | Age: 44
End: 2019-02-26

## 2019-02-26 VITALS — SYSTOLIC BLOOD PRESSURE: 136 MMHG | DIASTOLIC BLOOD PRESSURE: 65 MMHG | WEIGHT: 274 LBS | BODY MASS INDEX: 45.6 KG/M2

## 2019-02-26 DIAGNOSIS — Q27.0 SINGLE UMBILICAL ARTERY: Primary | ICD-10-CM

## 2019-02-26 DIAGNOSIS — E66.01 MORBID OBESITY WITH BMI OF 45.0-49.9, ADULT (HCC): ICD-10-CM

## 2019-02-26 DIAGNOSIS — O36.5930 POOR FETAL GROWTH AFFECTING MANAGEMENT OF MOTHER IN THIRD TRIMESTER, SINGLE OR UNSPECIFIED FETUS: ICD-10-CM

## 2019-02-26 DIAGNOSIS — O12.13 PROTEINURIA AFFECTING PREGNANCY IN THIRD TRIMESTER: ICD-10-CM

## 2019-02-26 DIAGNOSIS — O09.523 ELDERLY MULTIGRAVIDA IN THIRD TRIMESTER: ICD-10-CM

## 2019-02-26 PROCEDURE — 76819 FETAL BIOPHYS PROFIL W/O NST: CPT | Performed by: OBSTETRICS & GYNECOLOGY

## 2019-02-26 PROCEDURE — 76819 FETAL BIOPHYS PROFIL W/O NST: CPT

## 2019-02-26 RX ORDER — RANITIDINE 150 MG/1
150 TABLET ORAL 2 TIMES DAILY
COMMUNITY
End: 2019-03-05

## 2019-02-26 NOTE — PROGRESS NOTES
"Pt denies all s/s PTL.  \"Have been seeing OB twice weekly.\"  Next OB appt will depend on findings of this appt.\"  \"My protein level doubled on my 24 hour urine test.\"  "

## 2019-02-26 NOTE — PROGRESS NOTES
Documentation of the ultasound findings, images, and interpretations with be available in the patient's Viewpoint report located in the Chart Review Imaging tab in [employer].

## 2019-02-27 ENCOUNTER — TELEPHONE (OUTPATIENT)
Dept: OBSTETRICS AND GYNECOLOGY | Facility: CLINIC | Age: 44
End: 2019-02-27

## 2019-02-27 NOTE — TELEPHONE ENCOUNTER
----- Message from Enid Escalante sent at 2/27/2019 10:19 AM EST -----  Contact: patient   Tika was seen yesterday at [facility] and wants to know when she is supposed to come back for an appointment here. She also wants to know if she needs to do NST on Thursday still also.     land

## 2019-02-28 ENCOUNTER — HOSPITAL ENCOUNTER (OUTPATIENT)
Facility: HOSPITAL | Age: 44
Discharge: HOME | End: 2019-03-01
Attending: NURSE PRACTITIONER | Admitting: NURSE PRACTITIONER

## 2019-02-28 ENCOUNTER — HOSPITAL ENCOUNTER (OUTPATIENT)
Dept: LABOR AND DELIVERY | Facility: HOSPITAL | Age: 44
Discharge: HOME | End: 2019-02-28

## 2019-02-28 LAB
ALBUMIN SERPL-MCNC: 2.5 G/DL (ref 3.5–5)
ALBUMIN/GLOB SERPL: 0.9 G/DL (ref 1–2)
ALP SERPL-CCNC: 125 U/L (ref 38–126)
ALT SERPL W P-5'-P-CCNC: 14 U/L (ref 13–69)
ANION GAP SERPL CALCULATED.3IONS-SCNC: 5.2 MMOL/L (ref 10–20)
AST SERPL-CCNC: 12 U/L (ref 15–46)
BILIRUB BLD-MCNC: NEGATIVE MG/DL
BILIRUB SERPL-MCNC: 0.1 MG/DL (ref 0.2–1.3)
BUN BLD-MCNC: 11 MG/DL (ref 7–20)
BUN/CREAT SERPL: 15.7 (ref 7.1–23.5)
CALCIUM SPEC-SCNC: 8.7 MG/DL (ref 8.4–10.2)
CHLORIDE SERPL-SCNC: 110 MMOL/L (ref 98–107)
CLARITY, POC: CLEAR
CO2 SERPL-SCNC: 23 MMOL/L (ref 26–30)
COLOR UR: YELLOW
CREAT BLD-MCNC: 0.7 MG/DL (ref 0.6–1.3)
DEPRECATED RDW RBC AUTO: 57.4 FL (ref 37–54)
EOSINOPHIL # BLD MANUAL: 0.23 10*3/MM3 (ref 0–0.7)
EOSINOPHIL NFR BLD MANUAL: 2 % (ref 0–7)
ERYTHROCYTE [DISTWIDTH] IN BLOOD BY AUTOMATED COUNT: 16.7 % (ref 11.5–14.5)
GFR SERPL CREATININE-BSD FRML MDRD: 92 ML/MIN/1.73
GLOBULIN UR ELPH-MCNC: 2.7 GM/DL
GLUCOSE BLD-MCNC: 77 MG/DL (ref 74–98)
GLUCOSE UR STRIP-MCNC: NEGATIVE MG/DL
HCT VFR BLD AUTO: 32.3 % (ref 37–47)
HGB BLD-MCNC: 10.7 G/DL (ref 12–16)
KETONES UR QL: NEGATIVE
LARGE PLATELETS: ABNORMAL
LEUKOCYTE EST, POC: NEGATIVE
LYMPHOCYTES # BLD MANUAL: 1.85 10*3/MM3 (ref 0.6–3.4)
LYMPHOCYTES NFR BLD MANUAL: 16 % (ref 10–50)
LYMPHOCYTES NFR BLD MANUAL: 6 % (ref 0–12)
MCH RBC QN AUTO: 31.4 PG (ref 27–31)
MCHC RBC AUTO-ENTMCNC: 33.1 G/DL (ref 30–37)
MCV RBC AUTO: 94.7 FL (ref 81–99)
MONOCYTES # BLD AUTO: 0.7 10*3/MM3 (ref 0–0.9)
NEUTROPHILS # BLD AUTO: 8.81 10*3/MM3 (ref 2–6.9)
NEUTROPHILS NFR BLD MANUAL: 76 % (ref 37–80)
NITRITE UR-MCNC: NEGATIVE MG/ML
PH UR: 6.5 [PH] (ref 5–8)
PLATELET # BLD AUTO: 125 10*3/MM3 (ref 130–400)
PMV BLD AUTO: 11.2 FL (ref 6–12)
POTASSIUM BLD-SCNC: 4.2 MMOL/L (ref 3.5–5.1)
PROT SERPL-MCNC: 5.2 G/DL (ref 6.3–8.2)
PROT UR STRIP-MCNC: ABNORMAL MG/DL
RBC # BLD AUTO: 3.41 10*6/MM3 (ref 4.2–5.4)
RBC # UR STRIP: ABNORMAL /UL
RBC MORPH BLD: NORMAL
SMALL PLATELETS BLD QL SMEAR: ABNORMAL
SODIUM BLD-SCNC: 134 MMOL/L (ref 137–145)
SP GR UR: 1.01 (ref 1–1.03)
UROBILINOGEN UR QL: NORMAL
WBC MORPH BLD: NORMAL
WBC NRBC COR # BLD: 11.59 10*3/MM3 (ref 4.8–10.8)

## 2019-02-28 PROCEDURE — 59025 FETAL NON-STRESS TEST: CPT

## 2019-02-28 PROCEDURE — G0463 HOSPITAL OUTPT CLINIC VISIT: HCPCS

## 2019-02-28 PROCEDURE — 63710000001 PREDNISONE PER 5 MG: Performed by: NURSE PRACTITIONER

## 2019-02-28 PROCEDURE — 85027 COMPLETE CBC AUTOMATED: CPT | Performed by: NURSE PRACTITIONER

## 2019-02-28 PROCEDURE — 81002 URINALYSIS NONAUTO W/O SCOPE: CPT | Performed by: NURSE PRACTITIONER

## 2019-02-28 PROCEDURE — 99214 OFFICE O/P EST MOD 30 MIN: CPT | Performed by: NURSE PRACTITIONER

## 2019-02-28 PROCEDURE — 36415 COLL VENOUS BLD VENIPUNCTURE: CPT | Performed by: NURSE PRACTITIONER

## 2019-02-28 PROCEDURE — 85007 BL SMEAR W/DIFF WBC COUNT: CPT | Performed by: NURSE PRACTITIONER

## 2019-02-28 PROCEDURE — 80053 COMPREHEN METABOLIC PANEL: CPT | Performed by: NURSE PRACTITIONER

## 2019-02-28 RX ORDER — CETIRIZINE HYDROCHLORIDE, PSEUDOEPHEDRINE HYDROCHLORIDE 5; 120 MG/1; MG/1
1 TABLET, FILM COATED, EXTENDED RELEASE ORAL 2 TIMES DAILY
Status: DISCONTINUED | OUTPATIENT
Start: 2019-02-28 | End: 2019-03-01 | Stop reason: HOSPADM

## 2019-02-28 RX ORDER — PREDNISONE 1 MG/1
1 TABLET ORAL ONCE
Status: COMPLETED | OUTPATIENT
Start: 2019-02-28 | End: 2019-02-28

## 2019-02-28 RX ADMIN — CETIRIZINE HCL, PSEUDOEPHEDRINE HCL 1 TABLET: 5; 120 TABLET, EXTENDED RELEASE ORAL at 22:53

## 2019-02-28 RX ADMIN — PREDNISONE 1 MG: 1 TABLET ORAL at 22:53

## 2019-03-01 VITALS
OXYGEN SATURATION: 99 % | DIASTOLIC BLOOD PRESSURE: 66 MMHG | TEMPERATURE: 98.5 F | WEIGHT: 274 LBS | RESPIRATION RATE: 18 BRPM | BODY MASS INDEX: 43 KG/M2 | HEIGHT: 67 IN | SYSTOLIC BLOOD PRESSURE: 135 MMHG | HEART RATE: 73 BPM

## 2019-03-01 LAB
DEPRECATED RDW RBC AUTO: 59.2 FL (ref 37–54)
ERYTHROCYTE [DISTWIDTH] IN BLOOD BY AUTOMATED COUNT: 16.8 % (ref 11.5–14.5)
HCT VFR BLD AUTO: 31.6 % (ref 37–47)
HGB BLD-MCNC: 10.3 G/DL (ref 12–16)
MCH RBC QN AUTO: 31 PG (ref 27–31)
MCHC RBC AUTO-ENTMCNC: 32.6 G/DL (ref 30–37)
MCV RBC AUTO: 95.2 FL (ref 81–99)
PLATELET # BLD AUTO: 120 10*3/MM3 (ref 130–400)
PMV BLD AUTO: 11.2 FL (ref 6–12)
RBC # BLD AUTO: 3.32 10*6/MM3 (ref 4.2–5.4)
WBC NRBC COR # BLD: 11.03 10*3/MM3 (ref 4.8–10.8)

## 2019-03-01 PROCEDURE — 99214 OFFICE O/P EST MOD 30 MIN: CPT | Performed by: OBSTETRICS & GYNECOLOGY

## 2019-03-01 PROCEDURE — 63710000001 PREDNISONE PER 5 MG: Performed by: OBSTETRICS & GYNECOLOGY

## 2019-03-01 PROCEDURE — 59025 FETAL NON-STRESS TEST: CPT | Performed by: OBSTETRICS & GYNECOLOGY

## 2019-03-01 PROCEDURE — 85027 COMPLETE CBC AUTOMATED: CPT | Performed by: OBSTETRICS & GYNECOLOGY

## 2019-03-01 PROCEDURE — 59025 FETAL NON-STRESS TEST: CPT

## 2019-03-01 RX ORDER — METHYLPREDNISOLONE 4 MG/1
TABLET ORAL
Qty: 21 EACH | Refills: 0 | Status: SHIPPED | OUTPATIENT
Start: 2019-03-01 | End: 2019-03-05

## 2019-03-01 RX ORDER — PREDNISONE 10 MG/1
10 TABLET ORAL
Status: DISCONTINUED | OUTPATIENT
Start: 2019-03-01 | End: 2019-03-01 | Stop reason: HOSPADM

## 2019-03-01 RX ADMIN — PREDNISONE 10 MG: 10 TABLET ORAL at 08:08

## 2019-03-01 NOTE — PROGRESS NOTES
HUMBERTO Salmeron  Tika Pugh  : 1975  MRN: [id number]  [facility]: [id number]    History and Physical    Subjective   Tika Pugh is a 43 y.o. year old  with an Estimated Date of Delivery: 19 currently at 33w1d presenting for her NST with complaints of sinus congestion and overall not feeling well.  Pt was monitored over night.  Pt was given prednisone last evening.  Pt was seen at [facility] this week and had ultrasound.  Pt denies any headache, visual changes, epigastric pain this am.  Pt denies any cramping or contractions.        History  Obstetric History       T0      L0     SAB0   TAB0   Ectopic0   Molar0   Multiple0   Live Births0       # Outcome Date GA Lbr Ap/2nd Weight Sex Delivery Anes PTL Lv   1 Current                 Past Medical History:   Diagnosis Date   • Asthma     As a child   • Carpal tunnel syndrome during pregnancy    • Frequent UTI    • Heart burn    • Hx of gastric ulcer    • Kidney stone    • Migraine    • Rh negative status during pregnancy 2018   • Ulcer of abdomen wall (CMS/HCC)    • Urinary tract infection      No current facility-administered medications on file prior to encounter.      Current Outpatient Medications on File Prior to Encounter   Medication Sig Dispense Refill   • acetaminophen (TYLENOL) 325 MG tablet Take 650 mg by mouth Every 6 (Six) Hours As Needed for Mild Pain .     • [] amoxicillin-clavulanate (AUGMENTIN) 875-125 MG per tablet Take 1 tablet by mouth 2 (Two) Times a Day for 10 days. 20 tablet 0   • Cetirizine-Pseudoephedrine (ZYRTEC-D PO) Take 10 mg by mouth Daily.     • ferrous sulfate 325 (65 FE) MG tablet Take 1 tablet by mouth 2 (Two) Times a Day Before Meals. 60 tablet 6   • Prenatal Vit-Fe Fumarate-FA (PRENATAL VITAMIN 27-0.8) 27-0.8 MG tablet tablet Take 1 tablet by mouth Daily.       No Known Allergies  Past Surgical History:   Procedure Laterality Date   • KNEE ACL RECONSTRUCTION Left      Family History   Problem  Relation Age of Onset   • Diabetes Father    • Hypertension Father    • Stroke Father    • Cancer Brother    • Heart disease Maternal Grandmother    • Stroke Paternal Grandmother      Social History     Socioeconomic History   • Marital status: Single     Spouse name: Not on file   • Number of children: Not on file   • Years of education: Not on file   • Highest education level: Not on file   Tobacco Use   • Smoking status: Current Every Day Smoker     Packs/day: 0.50     Types: Cigarettes   • Smokeless tobacco: Never Used   Substance and Sexual Activity   • Alcohol use: No     Comment: Social   • Drug use: No   • Sexual activity: Yes     Partners: Male     Review of Systems  All systems were reviewed and negative except for:  ENT:  positive for nasal congestion     Objective  Vitals:    02/28/19 2310 03/01/19 0054 03/01/19 0712 03/01/19 0743   BP: 143/76 139/42 151/71 128/56   BP Location:       Patient Position:       Pulse: 84 80 80 71   Resp: 18 18 18 18   Temp: 98.5 °F (36.9 °C) 98.2 °F (36.8 °C)  98.5 °F (36.9 °C)   TempSrc: Oral Oral  Oral   SpO2:       Weight:       Height:         Physical Exam:  General Appearance: alert, appears stated age and cooperative  Head: normocephalic, without obvious abnormality and atraumatic  Eyes: lids and lashes normal, conjunctivae and sclerae normal, no icterus, no pallor and corneas clear  Ears: ears appear intact with no abnormalities noted  Nose: nares normal, septum midline, discharge and +congestion  Lungs: clear to auscultation, respirations regular, respirations even and respirations unlabored  Heart: regular rhythm and normal rate, normal S1, S2, no murmur, gallop, or rubs and no click  Abdomen: normal bowel sounds, no masses, no hepatomegaly, no splenomegaly, soft non-tender, no guarding, no rebound tenderness and uterus gravid and nontender  Extremities: 2+ edema LE  Skin: no bleeding, bruising or rash and no lesions noted  Neurologic: Mental Status orientated to  person, place, time and situation, Cranial Nerves cranial nerves 2 - 12 grossly intact as examined  Psych: normal mood and affect, oriented to person, time and place, thought content organized and appropriate judgment    FHT's:  reassuring and category 1  Cervix:  was not checked.  Presentation:  cephalic  Contractions:  none    Prenatal Labs  Lab Results   Component Value Date    HGB 10.7 (L) 02/28/2019    HEPBSAG Negative 08/28/2018    ABSCRN Negative 08/28/2018    [id number] Non Reactive 08/28/2018    HEPCVIRUSABY 0.1 08/28/2018    URINECX No growth 02/21/2019       Current Labs Reviewed               I reviewed the patient's new clinical results.                  Lab Results (last 24 hours)     Procedure Component Value Units Date/Time    CBC & Differential [[id number]] Collected:  02/28/19 1757    Specimen:  Blood Updated:  02/28/19 1829    Narrative:       The following orders were created for panel order CBC & Differential.  Procedure                               Abnormality         Status                     ---------                               -----------         ------                     Manual Differential[[id number]]          Abnormal            Final result               CBC Auto Differential[[id number]]        Abnormal            Final result                 Please view results for these tests on the individual orders.    Manual Differential [[id number]]  (Abnormal) Collected:  02/28/19 1757    Specimen:  Blood Updated:  02/28/19 1829     Neutrophil % 76.0 %      Lymphocyte % 16.0 %      Monocyte % 6.0 %      Eosinophil % 2.0 %      Neutrophils Absolute 8.81 10*3/mm3      Lymphocytes Absolute 1.85 10*3/mm3      Monocytes Absolute 0.70 10*3/mm3      Eosinophils Absolute 0.23 10*3/mm3      RBC Morphology Normal     WBC Morphology Normal     Platelet Estimate Decreased     Large Platelets Slight/1+    Comprehensive Metabolic Panel [[id number]]  (Abnormal) Collected:  02/28/19 1757    Specimen:  Blood  Updated:  02/28/19 1822     Glucose 77 mg/dL      BUN 11 mg/dL      Creatinine 0.70 mg/dL      Sodium 134 mmol/L      Potassium 4.2 mmol/L      Chloride 110 mmol/L      CO2 23.0 mmol/L      Calcium 8.7 mg/dL      Total Protein 5.2 g/dL      Albumin 2.50 g/dL      ALT (SGPT) 14 U/L      AST (SGOT) 12 U/L      Alkaline Phosphatase 125 U/L      Total Bilirubin 0.1 mg/dL      eGFR Non African Amer 92 mL/min/1.73      Globulin 2.7 gm/dL      A/G Ratio 0.9 g/dL      BUN/Creatinine Ratio 15.7     Anion Gap 5.2 mmol/L     Narrative:       GFR Normal >60  Chronic Kidney Disease <60  Kidney Failure <15    CBC Auto Differential [[id number]]  (Abnormal) Collected:  02/28/19 1757    Specimen:  Blood Updated:  02/28/19 1815     WBC 11.59 10*3/mm3      RBC 3.41 10*6/mm3      Hemoglobin 10.7 g/dL      Hematocrit 32.3 %      MCV 94.7 fL      MCH 31.4 pg      MCHC 33.1 g/dL      RDW 16.7 %      RDW-SD 57.4 fl      MPV 11.2 fL      Platelets 125 10*3/mm3     POC Urinalysis Dipstick [[id number]]  (Abnormal) Collected:  02/28/19 1645    Specimen:  Urine Updated:  02/28/19 1646     Color Yellow     Clarity, UA Clear     Glucose, UA Negative mg/dL      Bilirubin Negative     Ketones, UA Negative     Specific Gravity  1.015     Blood, UA Large     pH, Urine 6.5     Protein,  mg/dL mg/dL      Urobilinogen, UA Normal     Leukocytes Negative     Nitrite, UA Negative          Current Imaging Reviewed  I reviewed the patient's new clinical results.    Imaging Results (last 72 hours)     ** No results found for the last 72 hours. **             Assessment   1. IUP at 33w1d  2. Nephrotic syndrome  3. AMA  4. Obesity  5. Single umbilical Artery  6. Gestational HTN  7. Acute Sinusitis     Plan   1.  Repeat CBC this am  2.  Start medrol dose pack  3.  Continue antibiotics  4.  PIH precautions given  5.  Probable discharge later this am  6.  Continue modified bedrest  7.  Keep scheduled appt Monday  8.  Humidifier and cont Zyrtec OLIVIER Wayne  M.D.  3/1/2019  7:47 AM

## 2019-03-01 NOTE — NURSING NOTE
Dr. Wayne called and spoke with this RN regarding patient POC. Patient to be discharged to home with  take home instructions and RN to instruct patient to return tomorrow for NST and repeat CBC per Dr. Wayne; R/V.

## 2019-03-01 NOTE — PLAN OF CARE
Problem: Patient Care Overview  Goal: Plan of Care Review  Outcome: Ongoing (interventions implemented as appropriate)    Goal: Individualization and Mutuality  Outcome: Ongoing (interventions implemented as appropriate)    Goal: Discharge Needs Assessment  Outcome: Ongoing (interventions implemented as appropriate)    Goal: Interprofessional Rounds/Family Conf  Outcome: Ongoing (interventions implemented as appropriate)      Problem: Hypertensive Disease/Crisis (Arterial) (Adult)  Goal: Signs and Symptoms of Listed Potential Problems Will be Absent, Minimized or Managed (Hypertensive Disease/Crisis)  Outcome: Ongoing (interventions implemented as appropriate)      Problem: Pain, Acute (Adult)  Goal: Identify Related Risk Factors and Signs and Symptoms  Outcome: Ongoing (interventions implemented as appropriate)    Goal: Acceptable Pain Control/Comfort Level  Outcome: Ongoing (interventions implemented as appropriate)      Problem:  Labor (Adult,Obstetrics,Pediatric)  Goal: Signs and Symptoms of Listed Potential Problems Will be Absent, Minimized or Managed ( Labor)  Outcome: Ongoing (interventions implemented as appropriate)

## 2019-03-01 NOTE — NON STRESS TEST
Tika Pugh, a  at 33w1d with an SOPHIE of 2019, by Ultrasound, was seen at [facility] for a nonstress test.    Chief Complaint   Patient presents with   • Non-stress Test     IUGR       Patient Active Problem List   Diagnosis   • Chronic nonintractable headache   • Gastroesophageal reflux disease   • Maternal tobacco use in first trimester   • Rh negative status during pregnancy   • Anemia due to vitamin B12 deficiency   • Uterine leiomyoma   • AMA (advanced maternal age) multigravida 35+   • Carpal tunnel syndrome of left wrist   • Proteinuria affecting pregnancy in third trimester   • Single umbilical artery   • Poor fetal growth affecting management of mother in third trimester   • Morbid obesity with BMI of 45.0-49.9, adult (CMS/[facility])           Interpretation A  Nonstress Test Interpretation A: Reactive (19 0947 : Sonya Dixon, RN)

## 2019-03-01 NOTE — NON STRESS TEST
Non Stress Test     Jaron    Patient: Tika Pugh  : 1975  MRN: [id number]  CSN: [id number]  Date:     Estimated Date of Delivery: 19  Gestational Age: 33w1d    Indication for NST pregnancy-induced hypertension  AMA  Single umbilical artery  Nephrotic syndrome       Time On 16:00   Time Off 9:00       Interpretation    Baseline 's-140's  beats per minute   Category 1   Decelerations Absent       Additional Comments none       Recommendations for f/u F/u tomorrow with repeat NST and labs       This note has been electronically signed.    Monse Wayne M.D.

## 2019-03-02 ENCOUNTER — HOSPITAL ENCOUNTER (OUTPATIENT)
Facility: HOSPITAL | Age: 44
Discharge: HOME | End: 2019-03-03
Attending: OBSTETRICS & GYNECOLOGY | Admitting: OBSTETRICS & GYNECOLOGY

## 2019-03-02 LAB
ALBUMIN SERPL-MCNC: 2.5 G/DL (ref 3.5–5)
ALBUMIN/GLOB SERPL: 0.9 G/DL (ref 1–2)
ALP SERPL-CCNC: 131 U/L (ref 38–126)
ALT SERPL W P-5'-P-CCNC: 7 U/L (ref 13–69)
ANION GAP SERPL CALCULATED.3IONS-SCNC: 6.2 MMOL/L (ref 10–20)
AST SERPL-CCNC: 13 U/L (ref 15–46)
BILIRUB BLD-MCNC: NEGATIVE MG/DL
BILIRUB SERPL-MCNC: <0.1 MG/DL (ref 0.2–1.3)
BUN BLD-MCNC: 11 MG/DL (ref 7–20)
BUN/CREAT SERPL: 15.7 (ref 7.1–23.5)
CALCIUM SPEC-SCNC: 8.8 MG/DL (ref 8.4–10.2)
CHLORIDE SERPL-SCNC: 110 MMOL/L (ref 98–107)
CLARITY, POC: CLEAR
CO2 SERPL-SCNC: 23 MMOL/L (ref 26–30)
COLOR UR: YELLOW
CREAT BLD-MCNC: 0.7 MG/DL (ref 0.6–1.3)
DEPRECATED RDW RBC AUTO: 58.3 FL (ref 37–54)
ERYTHROCYTE [DISTWIDTH] IN BLOOD BY AUTOMATED COUNT: 16.3 % (ref 11.5–14.5)
FIBRINOGEN PPP-MCNC: 628 MG/DL (ref 200–400)
GFR SERPL CREATININE-BSD FRML MDRD: 92 ML/MIN/1.73
GLOBULIN UR ELPH-MCNC: 2.8 GM/DL
GLUCOSE BLD-MCNC: 89 MG/DL (ref 74–98)
GLUCOSE UR STRIP-MCNC: NEGATIVE MG/DL
HCT VFR BLD AUTO: 32.3 % (ref 37–47)
HGB BLD-MCNC: 10.5 G/DL (ref 12–16)
KETONES UR QL: NEGATIVE
LDH SERPL-CCNC: 624 U/L (ref 313–618)
LEUKOCYTE EST, POC: NEGATIVE
MCH RBC QN AUTO: 31.4 PG (ref 27–31)
MCHC RBC AUTO-ENTMCNC: 32.5 G/DL (ref 30–37)
MCV RBC AUTO: 96.7 FL (ref 81–99)
NITRITE UR-MCNC: NEGATIVE MG/ML
PH UR: 6.5 [PH] (ref 5–8)
PLATELET # BLD AUTO: 117 10*3/MM3 (ref 130–400)
PMV BLD AUTO: 11.3 FL (ref 6–12)
POTASSIUM BLD-SCNC: 4.2 MMOL/L (ref 3.5–5.1)
PROT SERPL-MCNC: 5.3 G/DL (ref 6.3–8.2)
PROT UR STRIP-MCNC: ABNORMAL MG/DL
RBC # BLD AUTO: 3.34 10*6/MM3 (ref 4.2–5.4)
RBC # UR STRIP: ABNORMAL /UL
SODIUM BLD-SCNC: 135 MMOL/L (ref 137–145)
SP GR UR: 1.02 (ref 1–1.03)
URATE SERPL-MCNC: 5.4 MG/DL (ref 2.5–8.5)
UROBILINOGEN UR QL: NORMAL
WBC NRBC COR # BLD: 11.78 10*3/MM3 (ref 4.8–10.8)

## 2019-03-02 PROCEDURE — 36415 COLL VENOUS BLD VENIPUNCTURE: CPT | Performed by: OBSTETRICS & GYNECOLOGY

## 2019-03-02 PROCEDURE — 63710000001 PREDNISONE PER 5 MG: Performed by: OBSTETRICS & GYNECOLOGY

## 2019-03-02 PROCEDURE — 99213 OFFICE O/P EST LOW 20 MIN: CPT | Performed by: OBSTETRICS & GYNECOLOGY

## 2019-03-02 PROCEDURE — 83615 LACTATE (LD) (LDH) ENZYME: CPT | Performed by: OBSTETRICS & GYNECOLOGY

## 2019-03-02 PROCEDURE — 59025 FETAL NON-STRESS TEST: CPT

## 2019-03-02 PROCEDURE — 81002 URINALYSIS NONAUTO W/O SCOPE: CPT | Performed by: NURSE PRACTITIONER

## 2019-03-02 PROCEDURE — 85384 FIBRINOGEN ACTIVITY: CPT | Performed by: OBSTETRICS & GYNECOLOGY

## 2019-03-02 PROCEDURE — 85027 COMPLETE CBC AUTOMATED: CPT | Performed by: OBSTETRICS & GYNECOLOGY

## 2019-03-02 PROCEDURE — 84550 ASSAY OF BLOOD/URIC ACID: CPT | Performed by: OBSTETRICS & GYNECOLOGY

## 2019-03-02 PROCEDURE — 80053 COMPREHEN METABOLIC PANEL: CPT | Performed by: OBSTETRICS & GYNECOLOGY

## 2019-03-02 PROCEDURE — G0463 HOSPITAL OUTPT CLINIC VISIT: HCPCS

## 2019-03-02 RX ORDER — FAMOTIDINE 20 MG/1
20 TABLET, FILM COATED ORAL 2 TIMES DAILY
Status: DISCONTINUED | OUTPATIENT
Start: 2019-03-02 | End: 2019-03-03 | Stop reason: HOSPADM

## 2019-03-02 RX ORDER — PREDNISONE 10 MG/1
10 TABLET ORAL ONCE
Status: COMPLETED | OUTPATIENT
Start: 2019-03-02 | End: 2019-03-02

## 2019-03-02 RX ORDER — FAMOTIDINE 10 MG/ML
20 INJECTION, SOLUTION INTRAVENOUS 2 TIMES DAILY
Status: DISCONTINUED | OUTPATIENT
Start: 2019-03-02 | End: 2019-03-02

## 2019-03-02 RX ADMIN — FAMOTIDINE 20 MG: 20 TABLET, FILM COATED ORAL at 22:40

## 2019-03-02 RX ADMIN — PREDNISONE 10 MG: 10 TABLET ORAL at 22:40

## 2019-03-03 VITALS
TEMPERATURE: 98.1 F | WEIGHT: 274 LBS | OXYGEN SATURATION: 99 % | HEART RATE: 74 BPM | SYSTOLIC BLOOD PRESSURE: 148 MMHG | BODY MASS INDEX: 43 KG/M2 | RESPIRATION RATE: 16 BRPM | HEIGHT: 67 IN | DIASTOLIC BLOOD PRESSURE: 66 MMHG

## 2019-03-03 LAB
BASOPHILS # BLD AUTO: 0.03 10*3/MM3 (ref 0–0.2)
BASOPHILS NFR BLD AUTO: 0.3 % (ref 0–2.5)
DEPRECATED RDW RBC AUTO: 56 FL (ref 37–54)
EOSINOPHIL # BLD AUTO: 0.1 10*3/MM3 (ref 0–0.7)
EOSINOPHIL NFR BLD AUTO: 0.9 % (ref 0–7)
ERYTHROCYTE [DISTWIDTH] IN BLOOD BY AUTOMATED COUNT: 16.5 % (ref 11.5–14.5)
HCT VFR BLD AUTO: 30.4 % (ref 37–47)
HGB BLD-MCNC: 10 G/DL (ref 12–16)
IMM GRANULOCYTES # BLD AUTO: 0.06 10*3/MM3 (ref 0–0.06)
IMM GRANULOCYTES NFR BLD AUTO: 0.5 % (ref 0–0.6)
LYMPHOCYTES # BLD AUTO: 1.52 10*3/MM3 (ref 0.6–3.4)
LYMPHOCYTES NFR BLD AUTO: 13.7 % (ref 10–50)
MCH RBC QN AUTO: 30.9 PG (ref 27–31)
MCHC RBC AUTO-ENTMCNC: 32.9 G/DL (ref 30–37)
MCV RBC AUTO: 93.8 FL (ref 81–99)
MONOCYTES # BLD AUTO: 0.56 10*3/MM3 (ref 0–0.9)
MONOCYTES NFR BLD AUTO: 5.1 % (ref 0–12)
NEUTROPHILS # BLD AUTO: 8.79 10*3/MM3 (ref 2–6.9)
NEUTROPHILS NFR BLD AUTO: 79.5 % (ref 37–80)
NRBC BLD AUTO-RTO: 0 /100 WBC (ref 0–0)
PLATELET # BLD AUTO: 116 10*3/MM3 (ref 130–400)
PMV BLD AUTO: 10.9 FL (ref 6–12)
RBC # BLD AUTO: 3.24 10*6/MM3 (ref 4.2–5.4)
WBC NRBC COR # BLD: 11.06 10*3/MM3 (ref 4.8–10.8)

## 2019-03-03 PROCEDURE — 94799 UNLISTED PULMONARY SVC/PX: CPT

## 2019-03-03 PROCEDURE — 85025 COMPLETE CBC W/AUTO DIFF WBC: CPT | Performed by: OBSTETRICS & GYNECOLOGY

## 2019-03-03 PROCEDURE — 99213 OFFICE O/P EST LOW 20 MIN: CPT | Performed by: OBSTETRICS & GYNECOLOGY

## 2019-03-03 RX ORDER — ALBUTEROL SULFATE 2.5 MG/3ML
2.5 SOLUTION RESPIRATORY (INHALATION) ONCE
Status: COMPLETED | OUTPATIENT
Start: 2019-03-03 | End: 2019-03-03

## 2019-03-03 RX ORDER — CEFAZOLIN SODIUM 2 G/50ML
2 SOLUTION INTRAVENOUS ONCE
Status: DISCONTINUED | OUTPATIENT
Start: 2019-03-03 | End: 2019-03-03

## 2019-03-03 RX ADMIN — ALBUTEROL SULFATE 2.5 MG: 2.5 SOLUTION RESPIRATORY (INHALATION) at 07:55

## 2019-03-03 NOTE — PROGRESS NOTES
HUMBERTO Salmeron  Tika Pugh  : 1975  MRN: [id number]  CSN: [id number]    History and Physical    Subjective   Tika Pugh is a 43 y.o. year old  with an Estimated Date of Delivery: 19 currently at 33w3d who was admitted last evening for observation and repeat labs this am.  Pt with increase in her sinus congestion this am.  Pt reports it is always worse in the hospital.  Pt denies any headache, visual changes, epigastric pain.  Pt is congested this am but otherwise no complaints.  Pt denies any cramping or contractions.  Pt with good fetal movement.      History  Obstetric History       T0      L0     SAB0   TAB0   Ectopic0   Molar0   Multiple0   Live Births0       # Outcome Date GA Lbr Ap/2nd Weight Sex Delivery Anes PTL Lv   1 Current                 Past Medical History:   Diagnosis Date   • Asthma     As a child   • Carpal tunnel syndrome during pregnancy    • Frequent UTI    • Heart burn    • Hx of gastric ulcer    • Kidney stone    • Migraine    • Rh negative status during pregnancy 2018   • Ulcer of abdomen wall (CMS/HCC)    • Urinary tract infection      No current facility-administered medications on file prior to encounter.      Current Outpatient Medications on File Prior to Encounter   Medication Sig Dispense Refill   • Cetirizine-Pseudoephedrine (ZYRTEC-D PO) Take 10 mg by mouth Daily.     • ferrous sulfate 325 (65 FE) MG tablet Take 1 tablet by mouth 2 (Two) Times a Day Before Meals. 60 tablet 6   • Prenatal Vit-Fe Fumarate-FA (PRENATAL VITAMIN 27-0.8) 27-0.8 MG tablet tablet Take 1 tablet by mouth Daily.     • raNITIdine (ZANTAC) 150 MG tablet Take 150 mg by mouth 2 (Two) Times a Day.     • acetaminophen (TYLENOL) 325 MG tablet Take 650 mg by mouth Every 6 (Six) Hours As Needed for Mild Pain .     • methylPREDNISolone (MEDROL, JUNE,) 4 MG tablet Take as directed on package instructions. 21 each 0   • pantoprazole (PROTONIX) 40 MG EC tablet Take 1 tablet by  mouth Daily. 30 tablet 3     No Known Allergies  Past Surgical History:   Procedure Laterality Date   • KNEE ACL RECONSTRUCTION Left    • TONSILLECTOMY       Family History   Problem Relation Age of Onset   • Diabetes Father    • Hypertension Father    • Stroke Father    • Cancer Brother    • Heart disease Maternal Grandmother    • Stroke Paternal Grandmother      Social History     Socioeconomic History   • Marital status: Single     Spouse name: Not on file   • Number of children: Not on file   • Years of education: Not on file   • Highest education level: Not on file   Tobacco Use   • Smoking status: Current Every Day Smoker     Packs/day: 0.50     Types: Cigarettes   • Smokeless tobacco: Never Used   Substance and Sexual Activity   • Alcohol use: No     Comment: Social   • Drug use: No   • Sexual activity: Yes     Partners: Male     Birth control/protection: None     Review of Systems  All systems were reviewed and negative except for:  ENT:  positive for nasal congestion     Objective  Vitals:    03/03/19 0503 03/03/19 0601 03/03/19 0614 03/03/19 0701   BP: 142/77 137/74 136/69 155/61   Pulse: 76 84 70 68   Resp:       Temp:       TempSrc:       SpO2:       Weight:       Height:         Physical Exam:  General Appearance: alert, appears stated age and cooperative  Head: normocephalic, without obvious abnormality and atraumatic  Eyes: lids and lashes normal, conjunctivae and sclerae normal, no icterus, no pallor and corneas clear  Ears: ears appear intact with no abnormalities noted  Nose: septum midline, discharge and swelling  Lungs: respirations regular, respirations even, respirations unlabored and occ upper inspiratory wheezing bilaterally  Heart: regular rhythm and normal rate, normal S1, S2, no murmur, gallop, or rubs and no click  Abdomen: normal bowel sounds, no masses, no hepatomegaly, no splenomegaly, soft non-tender, no guarding, no rebound tenderness and uterus gravid and nontender  Pelvic: Not  performed.  Extremities: moves extremities well, no cyanosis, no redness and 2+ edema bilateral LE  Skin: no bleeding, bruising or rash and no lesions noted  Neurologic: Mental Status orientated to person, place, time and situation, Cranial Nerves cranial nerves 2 - 12 grossly intact as examined  Psych: normal mood and affect, oriented to person, time and place, thought content organized and appropriate judgment    FHT's:  reassuring and category 1  Cervix:  was not checked.  Presentation:  cephalic  Contractions:  none    Prenatal Labs  Lab Results   Component Value Date    HGB 10.0 (L) 03/03/2019    HEPBSAG Negative 08/28/2018    ABSCRN Negative 08/28/2018    [id number] Non Reactive 08/28/2018    HEPCVIRUSABY 0.1 08/28/2018    URINECX No growth 02/21/2019       Current Labs Reviewed               I reviewed the patient's new clinical results.                  Lab Results (last 24 hours)     Procedure Component Value Units Date/Time    CBC & Differential [[id number]] Collected:  03/03/19 0619    Specimen:  Blood Updated:  03/03/19 0628    Narrative:       The following orders were created for panel order CBC & Differential.  Procedure                               Abnormality         Status                     ---------                               -----------         ------                     CBC Auto Differential[[id number]]        Abnormal            Final result                 Please view results for these tests on the individual orders.    CBC Auto Differential [[id number]]  (Abnormal) Collected:  03/03/19 0619    Specimen:  Blood Updated:  03/03/19 0628     WBC 11.06 10*3/mm3      RBC 3.24 10*6/mm3      Hemoglobin 10.0 g/dL      Hematocrit 30.4 %      MCV 93.8 fL      MCH 30.9 pg      MCHC 32.9 g/dL      RDW 16.5 %      RDW-SD 56.0 fl      MPV 10.9 fL      Platelets 116 10*3/mm3      Neutrophil % 79.5 %      Lymphocyte % 13.7 %      Monocyte % 5.1 %      Eosinophil % 0.9 %      Basophil % 0.3 %      Immature  Grans % 0.5 %      Neutrophils, Absolute 8.79 10*3/mm3      Lymphocytes, Absolute 1.52 10*3/mm3      Monocytes, Absolute 0.56 10*3/mm3      Eosinophils, Absolute 0.10 10*3/mm3      Basophils, Absolute 0.03 10*3/mm3      Immature Grans, Absolute 0.06 10*3/mm3      nRBC 0.0 /100 WBC     Fibrinogen [[id number]]  (Abnormal) Collected:  03/02/19 1847    Specimen:  Blood Updated:  03/02/19 2007     Fibrinogen 628 mg/dL     Comprehensive Metabolic Panel [[id number]]  (Abnormal) Collected:  03/02/19 1847    Specimen:  Blood Updated:  03/02/19 1927     Glucose 89 mg/dL      BUN 11 mg/dL      Creatinine 0.70 mg/dL      Sodium 135 mmol/L      Potassium 4.2 mmol/L      Chloride 110 mmol/L      CO2 23.0 mmol/L      Calcium 8.8 mg/dL      Total Protein 5.3 g/dL      Albumin 2.50 g/dL      ALT (SGPT) 7 U/L      AST (SGOT) 13 U/L      Alkaline Phosphatase 131 U/L      Total Bilirubin <0.1 mg/dL      eGFR Non African Amer 92 mL/min/1.73      Globulin 2.8 gm/dL      A/G Ratio 0.9 g/dL      BUN/Creatinine Ratio 15.7     Anion Gap 6.2 mmol/L     Narrative:       GFR Normal >60  Chronic Kidney Disease <60  Kidney Failure <15    Lactate Dehydrogenase [[id number]]  (Abnormal) Collected:  03/02/19 1847    Specimen:  Blood Updated:  03/02/19 1923      U/L     Uric Acid [[id number]]  (Normal) Collected:  03/02/19 1847    Specimen:  Blood Updated:  03/02/19 1923     Uric Acid 5.4 mg/dL     CBC (No Diff) [[id number]]  (Abnormal) Collected:  03/02/19 1847    Specimen:  Blood Updated:  03/02/19 1857     WBC 11.78 10*3/mm3      RBC 3.34 10*6/mm3      Hemoglobin 10.5 g/dL      Hematocrit 32.3 %      MCV 96.7 fL      MCH 31.4 pg      MCHC 32.5 g/dL      RDW 16.3 %      RDW-SD 58.3 fl      MPV 11.3 fL      Platelets 117 10*3/mm3     POC Urinalysis Dipstick [[id number]]  (Abnormal) Collected:  03/02/19 1806    Specimen:  Urine Updated:  03/02/19 1807     Color Yellow     Clarity, UA Clear     Glucose, UA Negative mg/dL      Bilirubin Negative      Ketones, UA Negative     Specific Gravity  1.020     Blood, UA Large     pH, Urine 6.5     Protein, POC 3+ mg/dL      Urobilinogen, UA Normal     Leukocytes Negative     Nitrite, UA Negative          Current Imaging Reviewed  I reviewed the patient's new clinical results.    Imaging Results (last 72 hours)     ** No results found for the last 72 hours. **             Assessment   1. IUP at 33w3d  2. Mild Preeclampsia  3. Nephrotic syndrome  4. AMA  5. Single umbilical artery  6. Sinusitis     Plan   1. Platelet count with minimal changes since yesterday.  Plan as noted per conversation with Dr. Webber  2. Pt to f/u Tuesday with BPP and repeat CBC.  Plan twice weekly testing  as well as labs.  3. Instructions and precautions given.  4. Cont Zyrtec D, antibiotics, and medrol dose pack.  5. Nebulizer treatment this am prior to discharge.    Monse Wayne M.D.  3/3/2019  7:14 AM

## 2019-03-03 NOTE — H&P
HUMBERTO Salmeron  Tika Pugh  : 1975  MRN: [id number]  [facility]: [id number]    History and Physical    Subjective   Tika Pugh is a 43 y.o. year old  with an Estimated Date of Delivery: 19 currently at 33w2d presenting for her NST and repeat CBC.  Pt with nephrotic syndrome as well as mild preeclampsia.  Pt monitored overnight on  and discharged to home yesterday.  Platelet count on  125K.  Repeat platelet count on 3/1 120K.  Pt also received a dose of prednisone for her sinusitis and was discharged to home with a Rx for which patient has not gotten filled; reports pharmacy told her they never received it.  Pt has continued on her Augmentin.  Pt does report her congestion has improved.  Pt denies any headache, visual disturbances.  Pt with no nausea or epigastric pain.  Pt with good fetal movement.  Pt with no pain or cramping.  Pt has been on bedrest.  Pt has had worsening of her proteinuria of 5,184 mg on 24 hr urine ; 10,758 mg/24 hr on .  Pt has been followed by [facility] as well and had ultrasound with BPP and doppler studies on  which was reassuring.    Prenatal care has been with [facility].  It has been complicated by AMA, nephrotic syndrome, gestational HTN, obesity, and single umbilical artery.    History  Obstetric History       T0      L0     SAB0   TAB0   Ectopic0   Molar0   Multiple0   Live Births0       # Outcome Date GA Lbr Ap/2nd Weight Sex Delivery Anes PTL Lv   1 Current                 Past Medical History:   Diagnosis Date   • Asthma     As a child   • Carpal tunnel syndrome during pregnancy    • Frequent UTI    • Heart burn    • Hx of gastric ulcer    • Kidney stone    • Migraine    • Rh negative status during pregnancy 2018   • Ulcer of abdomen wall (CMS/HCC)    • Urinary tract infection      No current facility-administered medications on file prior to encounter.      Current Outpatient Medications on File Prior to Encounter    Medication Sig Dispense Refill   • Cetirizine-Pseudoephedrine (ZYRTEC-D PO) Take 10 mg by mouth Daily.     • ferrous sulfate 325 (65 FE) MG tablet Take 1 tablet by mouth 2 (Two) Times a Day Before Meals. 60 tablet 6   • Prenatal Vit-Fe Fumarate-FA (PRENATAL VITAMIN 27-0.8) 27-0.8 MG tablet tablet Take 1 tablet by mouth Daily.     • raNITIdine (ZANTAC) 150 MG tablet Take 150 mg by mouth 2 (Two) Times a Day.     • acetaminophen (TYLENOL) 325 MG tablet Take 650 mg by mouth Every 6 (Six) Hours As Needed for Mild Pain .     • methylPREDNISolone (MEDROL, JUNE,) 4 MG tablet Take as directed on package instructions. 21 each 0   • pantoprazole (PROTONIX) 40 MG EC tablet Take 1 tablet by mouth Daily. 30 tablet 3     No Known Allergies  Past Surgical History:   Procedure Laterality Date   • KNEE ACL RECONSTRUCTION Left    • TONSILLECTOMY       Family History   Problem Relation Age of Onset   • Diabetes Father    • Hypertension Father    • Stroke Father    • Cancer Brother    • Heart disease Maternal Grandmother    • Stroke Paternal Grandmother      Social History     Socioeconomic History   • Marital status: Single     Spouse name: Not on file   • Number of children: Not on file   • Years of education: Not on file   • Highest education level: Not on file   Tobacco Use   • Smoking status: Current Every Day Smoker     Packs/day: 0.50     Types: Cigarettes   • Smokeless tobacco: Never Used   Substance and Sexual Activity   • Alcohol use: No     Comment: Social   • Drug use: No   • Sexual activity: Yes     Partners: Male     Birth control/protection: None     Review of Systems  The following systems were reviewed and negative:  constitution, eyes, ENT, respiratory, cardiovascular, gastrointestinal, genitourinary, integument, breast, hematologic / lymphatic, musculoskeletal, neurological, behavioral/psych, endocrine and allergies / immunologic     Objective  Vitals:    03/02/19 2030 03/02/19 2045 03/02/19 2115 03/02/19 2130   BP:  136/59 136/68 145/71 140/68   Pulse: 79 81 82 79   Resp:       Temp:       TempSrc:       SpO2: 100% 99% 100% 100%   Weight:       Height:         Physical Exam:  General Appearance: alert, appears stated age and cooperative  Head: normocephalic, without obvious abnormality and atraumatic  Eyes: lids and lashes normal, conjunctivae and sclerae normal, no icterus, no pallor and corneas clear  Ears: ears appear intact with no abnormalities noted  Nose: nares normal, septum midline, mucosa normal and no drainage  Lungs: clear to auscultation, respirations regular, respirations even and respirations unlabored  Heart: regular rhythm and normal rate, normal S1, S2, no murmur, gallop, or rubs and no click  Abdomen: normal bowel sounds, no masses, no hepatomegaly, no splenomegaly, soft non-tender, no guarding, no rebound tenderness and uterus gravid and nontender  Pelvic: Not performed.  Extremities: moves extremities well, no cyanosis, no redness and 2+ edema  Skin: no bleeding, bruising or rash and no lesions noted  Neurologic: Mental Status orientated to person, place, time and situation, Cranial Nerves cranial nerves 2 - 12 grossly intact as examined  Psych: normal mood and affect, oriented to person, time and place, thought content organized and appropriate judgment    FHT's:  reassuring and category 1  Cervix:  was not checked.  Presentation:  cephalic  Contractions:  none    Prenatal Labs  Lab Results   Component Value Date    HGB 10.5 (L) 03/02/2019    HEPBSAG Negative 08/28/2018    ABSCRN Negative 08/28/2018    CON9SAD4 Non Reactive 08/28/2018    HEPCVIRUSABY 0.1 08/28/2018    URINECX No growth 02/21/2019       Current Labs Reviewed               I reviewed the patient's new clinical results.                  Lab Results (last 24 hours)     Procedure Component Value Units Date/Time    Fibrinogen [[id number]]  (Abnormal) Collected:  03/02/19 1847    Specimen:  Blood Updated:  03/02/19 2007     Fibrinogen 628 mg/dL      Comprehensive Metabolic Panel [[id number]]  (Abnormal) Collected:  03/02/19 1847    Specimen:  Blood Updated:  03/02/19 1927     Glucose 89 mg/dL      BUN 11 mg/dL      Creatinine 0.70 mg/dL      Sodium 135 mmol/L      Potassium 4.2 mmol/L      Chloride 110 mmol/L      CO2 23.0 mmol/L      Calcium 8.8 mg/dL      Total Protein 5.3 g/dL      Albumin 2.50 g/dL      ALT (SGPT) 7 U/L      AST (SGOT) 13 U/L      Alkaline Phosphatase 131 U/L      Total Bilirubin <0.1 mg/dL      eGFR Non African Amer 92 mL/min/1.73      Globulin 2.8 gm/dL      A/G Ratio 0.9 g/dL      BUN/Creatinine Ratio 15.7     Anion Gap 6.2 mmol/L     Narrative:       GFR Normal >60  Chronic Kidney Disease <60  Kidney Failure <15    Lactate Dehydrogenase [[id number]]  (Abnormal) Collected:  03/02/19 1847    Specimen:  Blood Updated:  03/02/19 1923      U/L     Uric Acid [[id number]]  (Normal) Collected:  03/02/19 1847    Specimen:  Blood Updated:  03/02/19 1923     Uric Acid 5.4 mg/dL     CBC (No Diff) [[id number]]  (Abnormal) Collected:  03/02/19 1847    Specimen:  Blood Updated:  03/02/19 1857     WBC 11.78 10*3/mm3      RBC 3.34 10*6/mm3      Hemoglobin 10.5 g/dL      Hematocrit 32.3 %      MCV 96.7 fL      MCH 31.4 pg      MCHC 32.5 g/dL      RDW 16.3 %      RDW-SD 58.3 fl      MPV 11.3 fL      Platelets 117 10*3/mm3     POC Urinalysis Dipstick [[id number]]  (Abnormal) Collected:  03/02/19 1806    Specimen:  Urine Updated:  03/02/19 1807     Color Yellow     Clarity, UA Clear     Glucose, UA Negative mg/dL      Bilirubin Negative     Ketones, UA Negative     Specific Gravity  1.020     Blood, UA Large     pH, Urine 6.5     Protein, POC 3+ mg/dL      Urobilinogen, UA Normal     Leukocytes Negative     Nitrite, UA Negative          Current Imaging Reviewed  I reviewed the patient's new clinical results.    Imaging Results (last 72 hours)     ** No results found for the last 72 hours. **             Assessment   1. IUP at 33w2d  2. AMA  3. Mild  preeclampsia  4. Nephrotic Syndrome  5. Single Umbilical Artery  6. Sinusitis     Plan   1. Discussed patient care with ARCENIO Franklin.  It is recommended to monitor patient overnight with repeat CBC then twice weekly testing but no intervention at this time unless platelet count persistently less than 100K and/or other signs or symptoms.  2. Repeat labs in am as ordered.  3. Discussed plan of care with patient and significant other who are in agreement with plan and voice understanding.  4. Prednisone for sinusitis/bronchitis/congestion.    Monse Wayne M.D.  3/2/2019  10:00 PM

## 2019-03-03 NOTE — NURSING NOTE
This RN called and spoke with Dr. Wayne concerning lab results; new orders received to draw CMP, LDH, uric acid, and fibrinogen; R/V. 7P shift to call MD with results.

## 2019-03-05 ENCOUNTER — HOSPITAL ENCOUNTER (OUTPATIENT)
Facility: HOSPITAL | Age: 44
Discharge: HOME | End: 2019-03-05
Attending: NURSE PRACTITIONER | Admitting: NURSE PRACTITIONER

## 2019-03-05 ENCOUNTER — ROUTINE PRENATAL (OUTPATIENT)
Dept: OBSTETRICS AND GYNECOLOGY | Facility: CLINIC | Age: 44
End: 2019-03-05

## 2019-03-05 VITALS — DIASTOLIC BLOOD PRESSURE: 63 MMHG | SYSTOLIC BLOOD PRESSURE: 148 MMHG | HEART RATE: 77 BPM | OXYGEN SATURATION: 100 %

## 2019-03-05 VITALS — BODY MASS INDEX: 45.11 KG/M2 | WEIGHT: 288 LBS | SYSTOLIC BLOOD PRESSURE: 152 MMHG | DIASTOLIC BLOOD PRESSURE: 100 MMHG

## 2019-03-05 DIAGNOSIS — Z67.91 RH NEGATIVE STATUS DURING PREGNANCY IN THIRD TRIMESTER: ICD-10-CM

## 2019-03-05 DIAGNOSIS — O26.893 RH NEGATIVE STATUS DURING PREGNANCY IN THIRD TRIMESTER: ICD-10-CM

## 2019-03-05 DIAGNOSIS — O14.93 PRE-ECLAMPSIA IN THIRD TRIMESTER: ICD-10-CM

## 2019-03-05 DIAGNOSIS — R80.9 NEPHROTIC RANGE PROTEINURIA: ICD-10-CM

## 2019-03-05 DIAGNOSIS — Q27.0 SINGLE UMBILICAL ARTERY: ICD-10-CM

## 2019-03-05 DIAGNOSIS — Z34.93 PRENATAL CARE IN THIRD TRIMESTER: Primary | ICD-10-CM

## 2019-03-05 DIAGNOSIS — D25.9 UTERINE LEIOMYOMA, UNSPECIFIED LOCATION: ICD-10-CM

## 2019-03-05 DIAGNOSIS — O36.5930 POOR FETAL GROWTH AFFECTING MANAGEMENT OF MOTHER IN THIRD TRIMESTER, SINGLE OR UNSPECIFIED FETUS: ICD-10-CM

## 2019-03-05 DIAGNOSIS — E66.01 MORBID OBESITY WITH BMI OF 45.0-49.9, ADULT (HCC): ICD-10-CM

## 2019-03-05 DIAGNOSIS — O09.523 ELDERLY MULTIGRAVIDA IN THIRD TRIMESTER: ICD-10-CM

## 2019-03-05 LAB
ALT SERPL W P-5'-P-CCNC: 16 U/L (ref 13–69)
AST SERPL-CCNC: 13 U/L (ref 15–46)
BASOPHILS # BLD AUTO: 0.03 10*3/MM3 (ref 0–0.2)
BASOPHILS NFR BLD AUTO: 0.2 % (ref 0–2.5)
BILIRUB BLD-MCNC: NEGATIVE MG/DL
CLARITY, POC: CLEAR
COLOR UR: YELLOW
CREAT BLD-MCNC: 0.6 MG/DL (ref 0.6–1.3)
DEPRECATED RDW RBC AUTO: 58.5 FL (ref 37–54)
EOSINOPHIL # BLD AUTO: 0.07 10*3/MM3 (ref 0–0.7)
EOSINOPHIL NFR BLD AUTO: 0.6 % (ref 0–7)
ERYTHROCYTE [DISTWIDTH] IN BLOOD BY AUTOMATED COUNT: 16.6 % (ref 11.5–14.5)
GFR SERPL CREATININE-BSD FRML MDRD: 110 ML/MIN/1.73
GLUCOSE UR STRIP-MCNC: NEGATIVE MG/DL
HCT VFR BLD AUTO: 32.7 % (ref 37–47)
HGB BLD-MCNC: 10.7 G/DL (ref 12–16)
IMM GRANULOCYTES # BLD AUTO: 0.06 10*3/MM3 (ref 0–0.06)
IMM GRANULOCYTES NFR BLD AUTO: 0.5 % (ref 0–0.6)
KETONES UR QL: NEGATIVE
LEUKOCYTE EST, POC: NEGATIVE
LYMPHOCYTES # BLD AUTO: 1.63 10*3/MM3 (ref 0.6–3.4)
LYMPHOCYTES NFR BLD AUTO: 13.3 % (ref 10–50)
MCH RBC QN AUTO: 31.6 PG (ref 27–31)
MCHC RBC AUTO-ENTMCNC: 32.7 G/DL (ref 30–37)
MCV RBC AUTO: 96.5 FL (ref 81–99)
MONOCYTES # BLD AUTO: 0.67 10*3/MM3 (ref 0–0.9)
MONOCYTES NFR BLD AUTO: 5.5 % (ref 0–12)
NEUTROPHILS # BLD AUTO: 9.78 10*3/MM3 (ref 2–6.9)
NEUTROPHILS NFR BLD AUTO: 79.9 % (ref 37–80)
NITRITE UR-MCNC: NEGATIVE MG/ML
NRBC BLD AUTO-RTO: 0 /100 WBC (ref 0–0)
PH UR: 6.5 [PH] (ref 5–8)
PLATELET # BLD AUTO: 120 10*3/MM3 (ref 130–400)
PMV BLD AUTO: 11.1 FL (ref 6–12)
PROT UR STRIP-MCNC: ABNORMAL MG/DL
RBC # BLD AUTO: 3.39 10*6/MM3 (ref 4.2–5.4)
RBC # UR STRIP: ABNORMAL /UL
SP GR UR: 1.01 (ref 1–1.03)
URATE SERPL-MCNC: 5.4 MG/DL (ref 2.5–8.5)
UROBILINOGEN UR QL: NORMAL
WBC NRBC COR # BLD: 12.24 10*3/MM3 (ref 4.8–10.8)

## 2019-03-05 PROCEDURE — 59025 FETAL NON-STRESS TEST: CPT

## 2019-03-05 PROCEDURE — 85025 COMPLETE CBC W/AUTO DIFF WBC: CPT | Performed by: NURSE PRACTITIONER

## 2019-03-05 PROCEDURE — 36415 COLL VENOUS BLD VENIPUNCTURE: CPT | Performed by: NURSE PRACTITIONER

## 2019-03-05 PROCEDURE — G0463 HOSPITAL OUTPT CLINIC VISIT: HCPCS

## 2019-03-05 PROCEDURE — 84450 TRANSFERASE (AST) (SGOT): CPT | Performed by: NURSE PRACTITIONER

## 2019-03-05 PROCEDURE — 99214 OFFICE O/P EST MOD 30 MIN: CPT | Performed by: OBSTETRICS & GYNECOLOGY

## 2019-03-05 PROCEDURE — 84550 ASSAY OF BLOOD/URIC ACID: CPT | Performed by: NURSE PRACTITIONER

## 2019-03-05 PROCEDURE — 81002 URINALYSIS NONAUTO W/O SCOPE: CPT | Performed by: NURSE PRACTITIONER

## 2019-03-05 PROCEDURE — 59025 FETAL NON-STRESS TEST: CPT | Performed by: NURSE PRACTITIONER

## 2019-03-05 PROCEDURE — 82565 ASSAY OF CREATININE: CPT | Performed by: NURSE PRACTITIONER

## 2019-03-05 PROCEDURE — 84460 ALANINE AMINO (ALT) (SGPT): CPT | Performed by: NURSE PRACTITIONER

## 2019-03-05 RX ORDER — AMOXICILLIN AND CLAVULANATE POTASSIUM 875; 125 MG/1; MG/1
1 TABLET, FILM COATED ORAL 2 TIMES DAILY
COMMUNITY
Start: 2019-02-23 | End: 2019-03-05

## 2019-03-05 RX ORDER — METHYLPREDNISOLONE 4 MG/1
1 TABLET ORAL DAILY
COMMUNITY
Start: 2019-03-03 | End: 2019-03-10

## 2019-03-06 NOTE — PROGRESS NOTES
Chief Complaint   Patient presents with   • Pregnancy Ultrasound     BPP done today, C/O extreme swelling and nose bleeds       HPI:   Tika is a  currently at 33w5d who today reports the following:  Contractions - No; Leaking - No; Vaginal bleeding -  No; Swelling of extremities - YES. Good fetal movement - YES.    URI improved with steroid taper    ROS:  GI: Nausea - No; Constipation - No; Diarrhea - No. RUQ pain - No    Neuro: Headache - No; Visual disturbances - No.    Pertinent items are noted in HPI, all other systems reviewed and negative    Review of History:  The following portions of the patient's history were reviewed and updated as appropriate:problem list, current medications, allergies, past family history, past medical history, past social history and past surgical history.    Current Outpatient Medications on File Prior to Visit   Medication Sig Dispense Refill   • acetaminophen (TYLENOL) 325 MG tablet Take 650 mg by mouth Every 6 (Six) Hours As Needed for Mild Pain .     • Cetirizine-Pseudoephedrine (ZYRTEC-D PO) Take 10 mg by mouth Daily.     • ferrous sulfate 325 (65 FE) MG tablet Take 1 tablet by mouth 2 (Two) Times a Day Before Meals. 60 tablet 6   • pantoprazole (PROTONIX) 40 MG EC tablet Take 1 tablet by mouth Daily. 30 tablet 3   • Prenatal Vit-Fe Fumarate-FA (PRENATAL VITAMIN 27-0.8) 27-0.8 MG tablet tablet Take 1 tablet by mouth Daily.     • amoxicillin-clavulanate (AUGMENTIN) 875-125 MG per tablet Take 1 tablet by mouth 2 (Two) Times a Day.     • methylPREDNISolone (MEDROL, JUNE,) 4 MG tablet Take 1 tablet by mouth Daily. Take as directed on package instructions.     • [DISCONTINUED] methylPREDNISolone (MEDROL, JUNE,) 4 MG tablet Take as directed on package instructions. 21 each 0   • [DISCONTINUED] raNITIdine (ZANTAC) 150 MG tablet Take 150 mg by mouth 2 (Two) Times a Day.       No current facility-administered medications on file prior to visit.        EXAM:  /100   Wt 131  kg (288 lb)   LMP  (LMP Unknown)   BMI 45.11 kg/m²     Gen: NAD, conversant  Pulm: No use of accessory muscles, normal respirations  Abdomen: Gravid, nontender, size = dates, + fetal cardiac activity  Ext: no edema, no rashes, WWP  Gait: normal for pregnancy  Psych: Mood, insight, judgement intact  SVE: Not performed     Lab Results   Component Value Date    ABO A 08/28/2018    RH Negative 08/28/2018    ABSCRN Negative 08/28/2018       Social History    Tobacco Use      Smoking status: Current Every Day Smoker        Packs/day: 0.50        Types: Cigarettes      Smokeless tobacco: Never Used      I have reviewed the prenatal labs and previous ultrasounds today.    MDM:  Diagnosis: Supervision of high risk pregnancy  Rh negative  AMA  Tobacco use in pregnancy   Known leiomyoma, stable  Subchorionic hemorrhage, resolved  2 vessel cord  H/o profound B12 deficiency  Carpal tunnel syndrome  Preeclampsia without severe features  Nephrotic range proteinuria  BPP 6/8   Tests/Orders/Rx today:   IUP at 33+5 weeks. Limited anatomy was reviewed today and is normal in appearance. BPP 6/8 - no breathing. Cephalic presentation. Amniotic fluid and fetal heart rate are normal.  Meds Ordered: none   Topics discussed: Preeclampsia  Proteinuria ~ 10,000 on last 24 hour collection  Platelets slowly falling - 118 two days ago  Previously received steroids for lung maturity  To labor vee for updated labs and NST given BPP 6/8  Plan for delivery at 37 weeks if possible   Tests next visit: BPP  NST   Next visit: 1 week(s)     Greater than 50% of this 25 minute visit was spent in face-to-face counseling and/or coordination of care for this patient.    Mark Nowak MD  Obstetrics and Gynecology  [facility]

## 2019-03-06 NOTE — NON STRESS TEST
"Triage Note - Nursing Documentation  Labor and Delivery Admission Log    Tika Pugh  : 1975  MRN: [id number]  [facility]: [id number]    Date in / Time in:  3/5/2019  Time in:       Date out / Time out:    Time out:      Nurse: Hayley Esteban RN    Patient Info: She is a 43 y.o. year old  at 33w5d with an SOPHIE of 2019, by Ultrasound who was seen on the [facility].    Chief Complaint:   Chief Complaint   Patient presents with   • Non-stress Test     \"I was told to come at 7pm for an NST\"       Provider Instructions / Disposition: Return to  Friday for NST    Patient Active Problem List   Diagnosis   • Chronic nonintractable headache   • Gastroesophageal reflux disease   • Maternal tobacco use in first trimester   • Rh negative status during pregnancy   • Anemia due to vitamin B12 deficiency   • Uterine leiomyoma   • AMA (advanced maternal age) multigravida 35+   • Carpal tunnel syndrome of left wrist   • Proteinuria affecting pregnancy in third trimester   • Single umbilical artery   • Poor fetal growth affecting management of mother in third trimester   • Morbid obesity with BMI of 45.0-49.9, adult (CMS/[facility])       NST Documentation (Only applicable > 32 weeks): Interpretation A  Nonstress Test Interpretation A: Reactive (19 : Hayley Esteban, ASHANTI)     "

## 2019-03-07 PROBLEM — R80.9 NEPHROTIC RANGE PROTEINURIA: Status: ACTIVE | Noted: 2019-02-14

## 2019-03-07 PROBLEM — O14.93 PRE-ECLAMPSIA IN THIRD TRIMESTER: Status: ACTIVE | Noted: 2019-03-07

## 2019-03-08 ENCOUNTER — HOSPITAL ENCOUNTER (OUTPATIENT)
Facility: HOSPITAL | Age: 44
Discharge: HOME | End: 2019-03-08
Attending: NURSE PRACTITIONER | Admitting: NURSE PRACTITIONER

## 2019-03-08 VITALS
HEIGHT: 67 IN | TEMPERATURE: 98.5 F | DIASTOLIC BLOOD PRESSURE: 65 MMHG | HEART RATE: 78 BPM | SYSTOLIC BLOOD PRESSURE: 145 MMHG | BODY MASS INDEX: 45.2 KG/M2 | WEIGHT: 288 LBS | RESPIRATION RATE: 20 BRPM

## 2019-03-08 LAB
ALBUMIN SERPL-MCNC: 2.4 G/DL (ref 3.5–5)
ALBUMIN/GLOB SERPL: 0.9 G/DL (ref 1–2)
ALP SERPL-CCNC: 143 U/L (ref 38–126)
ALT SERPL W P-5'-P-CCNC: 15 U/L (ref 13–69)
ANION GAP SERPL CALCULATED.3IONS-SCNC: 5 MMOL/L (ref 10–20)
AST SERPL-CCNC: 13 U/L (ref 15–46)
BASOPHILS # BLD AUTO: 0.04 10*3/MM3 (ref 0–0.2)
BASOPHILS NFR BLD AUTO: 0.3 % (ref 0–2.5)
BILIRUB BLD-MCNC: NEGATIVE MG/DL
BILIRUB SERPL-MCNC: 0.1 MG/DL (ref 0.2–1.3)
BUN BLD-MCNC: 12 MG/DL (ref 7–20)
BUN/CREAT SERPL: 17.1 (ref 7.1–23.5)
CALCIUM SPEC-SCNC: 8.3 MG/DL (ref 8.4–10.2)
CHLORIDE SERPL-SCNC: 109 MMOL/L (ref 98–107)
CLARITY, POC: CLEAR
CO2 SERPL-SCNC: 23 MMOL/L (ref 26–30)
COLOR UR: ABNORMAL
CREAT BLD-MCNC: 0.7 MG/DL (ref 0.6–1.3)
DEPRECATED RDW RBC AUTO: 58.7 FL (ref 37–54)
EOSINOPHIL # BLD AUTO: 0.11 10*3/MM3 (ref 0–0.7)
EOSINOPHIL NFR BLD AUTO: 0.9 % (ref 0–7)
ERYTHROCYTE [DISTWIDTH] IN BLOOD BY AUTOMATED COUNT: 16.7 % (ref 11.5–14.5)
GFR SERPL CREATININE-BSD FRML MDRD: 92 ML/MIN/1.73
GLOBULIN UR ELPH-MCNC: 2.6 GM/DL
GLUCOSE BLD-MCNC: 84 MG/DL (ref 74–98)
GLUCOSE UR STRIP-MCNC: NEGATIVE MG/DL
HCT VFR BLD AUTO: 32.7 % (ref 37–47)
HGB BLD-MCNC: 10.8 G/DL (ref 12–16)
IMM GRANULOCYTES # BLD AUTO: 0.06 10*3/MM3 (ref 0–0.06)
IMM GRANULOCYTES NFR BLD AUTO: 0.5 % (ref 0–0.6)
KETONES UR QL: NEGATIVE
LEUKOCYTE EST, POC: NEGATIVE
LYMPHOCYTES # BLD AUTO: 1.55 10*3/MM3 (ref 0.6–3.4)
LYMPHOCYTES NFR BLD AUTO: 12.3 % (ref 10–50)
MCH RBC QN AUTO: 31.7 PG (ref 27–31)
MCHC RBC AUTO-ENTMCNC: 33 G/DL (ref 30–37)
MCV RBC AUTO: 95.9 FL (ref 81–99)
MONOCYTES # BLD AUTO: 0.68 10*3/MM3 (ref 0–0.9)
MONOCYTES NFR BLD AUTO: 5.4 % (ref 0–12)
NEUTROPHILS # BLD AUTO: 10.18 10*3/MM3 (ref 2–6.9)
NEUTROPHILS NFR BLD AUTO: 80.6 % (ref 37–80)
NITRITE UR-MCNC: NEGATIVE MG/ML
NRBC BLD AUTO-RTO: 0 /100 WBC (ref 0–0)
PH UR: 6 [PH] (ref 5–8)
PLATELET # BLD AUTO: 119 10*3/MM3 (ref 130–400)
PMV BLD AUTO: 10.7 FL (ref 6–12)
POTASSIUM BLD-SCNC: 4 MMOL/L (ref 3.5–5.1)
PROT SERPL-MCNC: 5 G/DL (ref 6.3–8.2)
PROT UR STRIP-MCNC: ABNORMAL MG/DL
RBC # BLD AUTO: 3.41 10*6/MM3 (ref 4.2–5.4)
RBC # UR STRIP: ABNORMAL /UL
SODIUM BLD-SCNC: 133 MMOL/L (ref 137–145)
SP GR UR: 1.03 (ref 1–1.03)
UROBILINOGEN UR QL: NORMAL
WBC NRBC COR # BLD: 12.62 10*3/MM3 (ref 4.8–10.8)

## 2019-03-08 PROCEDURE — 85025 COMPLETE CBC W/AUTO DIFF WBC: CPT | Performed by: NURSE PRACTITIONER

## 2019-03-08 PROCEDURE — 81002 URINALYSIS NONAUTO W/O SCOPE: CPT | Performed by: NURSE PRACTITIONER

## 2019-03-08 PROCEDURE — 59025 FETAL NON-STRESS TEST: CPT | Performed by: NURSE PRACTITIONER

## 2019-03-08 PROCEDURE — 80053 COMPREHEN METABOLIC PANEL: CPT | Performed by: NURSE PRACTITIONER

## 2019-03-08 PROCEDURE — 36415 COLL VENOUS BLD VENIPUNCTURE: CPT | Performed by: NURSE PRACTITIONER

## 2019-03-08 PROCEDURE — 59025 FETAL NON-STRESS TEST: CPT

## 2019-03-09 NOTE — NURSING NOTE
43 year old white female  at 34 1/7 weeks gestation brought to [facility] per wheelchair for NST for protein in urine. Feet has edema 3 + and states edema all the way up thigh.

## 2019-03-09 NOTE — NON STRESS TEST
Triage Note - Nursing Documentation  Labor and Delivery Admission Log    Tika Pugh  : 1975  MRN: [id number]  CSN: [id number]    Date in / Time in:  3/8/2019  Time in:      Date out / Time out:    Time out:       Nurse: Amna Anguiano RN    Patient Info: She is a 43 y.o. year old  at 34w1d with an SOPHIE of 2019, by Ultrasound who [facility]    Chief Complaint: nose bleed and legs swollen  Chief Complaint   Patient presents with   • Non-stress Test     HPERTENSION       Provider Instructions / Disposition: discharge bedrest to be see tuesday    Patient Active Problem List   Diagnosis   • Chronic nonintractable headache   • Gastroesophageal reflux disease   • Maternal tobacco use in first trimester   • Rh negative status during pregnancy   • Anemia due to vitamin B12 deficiency   • Uterine leiomyoma   • AMA (advanced maternal age) multigravida 35+   • Carpal tunnel syndrome of left wrist   • Nephrotic range proteinuria   • Single umbilical artery   • Poor fetal growth affecting management of mother in third trimester   • Morbid obesity with BMI of 45.0-49.9, adult (CMS/[facility])   • Pre-eclampsia in third trimester       NST Documentation (Only applicable > 32 weeks): Interpretation A  Nonstress Test Interpretation A: Reactive (19 : Amna Anguiano, RN)

## 2019-03-12 ENCOUNTER — ROUTINE PRENATAL (OUTPATIENT)
Dept: OBSTETRICS AND GYNECOLOGY | Facility: CLINIC | Age: 44
End: 2019-03-12

## 2019-03-12 VITALS — WEIGHT: 293 LBS | SYSTOLIC BLOOD PRESSURE: 148 MMHG | DIASTOLIC BLOOD PRESSURE: 94 MMHG | BODY MASS INDEX: 48.71 KG/M2

## 2019-03-12 DIAGNOSIS — Q27.0 SINGLE UMBILICAL ARTERY: ICD-10-CM

## 2019-03-12 DIAGNOSIS — O36.5930 POOR FETAL GROWTH AFFECTING MANAGEMENT OF MOTHER IN THIRD TRIMESTER, SINGLE OR UNSPECIFIED FETUS: ICD-10-CM

## 2019-03-12 DIAGNOSIS — R80.9 NEPHROTIC RANGE PROTEINURIA: ICD-10-CM

## 2019-03-12 DIAGNOSIS — E66.01 MORBID OBESITY WITH BMI OF 45.0-49.9, ADULT (HCC): ICD-10-CM

## 2019-03-12 DIAGNOSIS — Z67.91 RH NEGATIVE STATUS DURING PREGNANCY IN THIRD TRIMESTER: ICD-10-CM

## 2019-03-12 DIAGNOSIS — O26.893 RH NEGATIVE STATUS DURING PREGNANCY IN THIRD TRIMESTER: ICD-10-CM

## 2019-03-12 DIAGNOSIS — O14.93 PREECLAMPSIA, THIRD TRIMESTER: ICD-10-CM

## 2019-03-12 DIAGNOSIS — Z36.85 ANTENATAL SCREENING FOR STREPTOCOCCUS B: ICD-10-CM

## 2019-03-12 DIAGNOSIS — Z34.93 PRENATAL CARE IN THIRD TRIMESTER: Primary | ICD-10-CM

## 2019-03-12 DIAGNOSIS — R09.81 SINUS CONGESTION: ICD-10-CM

## 2019-03-12 DIAGNOSIS — D25.9 UTERINE LEIOMYOMA, UNSPECIFIED LOCATION: ICD-10-CM

## 2019-03-12 DIAGNOSIS — O09.523 ELDERLY MULTIGRAVIDA IN THIRD TRIMESTER: ICD-10-CM

## 2019-03-12 LAB
ALBUMIN SERPL-MCNC: 2.2 G/DL (ref 3.5–5)
ALBUMIN/GLOB SERPL: 0.9 G/DL (ref 1–2)
ALP SERPL-CCNC: 155 U/L (ref 38–126)
ALT SERPL-CCNC: 20 U/L (ref 13–69)
AST SERPL-CCNC: 20 U/L (ref 15–46)
BASOPHILS # BLD AUTO: 0.03 10*3/MM3 (ref 0–0.2)
BASOPHILS NFR BLD AUTO: 0.2 % (ref 0–2.5)
BILIRUB SERPL-MCNC: 0.1 MG/DL (ref 0.2–1.3)
BUN SERPL-MCNC: 11 MG/DL (ref 7–20)
BUN/CREAT SERPL: 15.7 (ref 7.1–23.5)
CALCIUM SERPL-MCNC: 8.6 MG/DL (ref 8.4–10.2)
CHLORIDE SERPL-SCNC: 110 MMOL/L (ref 98–107)
CO2 SERPL-SCNC: 23 MMOL/L (ref 26–30)
CREAT SERPL-MCNC: 0.7 MG/DL (ref 0.6–1.3)
EOSINOPHIL # BLD AUTO: 0.16 10*3/MM3 (ref 0–0.7)
EOSINOPHIL NFR BLD AUTO: 1.3 % (ref 0–7)
ERYTHROCYTE [DISTWIDTH] IN BLOOD BY AUTOMATED COUNT: 16.8 % (ref 11.5–14.5)
GLOBULIN SER CALC-MCNC: 2.5 GM/DL
GLUCOSE SERPL-MCNC: 67 MG/DL (ref 74–98)
HCT VFR BLD AUTO: 33.6 % (ref 37–47)
HGB BLD-MCNC: 11.1 G/DL (ref 12–16)
IMM GRANULOCYTES # BLD AUTO: 0.07 10*3/MM3 (ref 0–0.06)
IMM GRANULOCYTES NFR BLD AUTO: 0.6 % (ref 0–0.6)
LYMPHOCYTES # BLD AUTO: 1.6 10*3/MM3 (ref 0.6–3.4)
LYMPHOCYTES NFR BLD AUTO: 13 % (ref 10–50)
MCH RBC QN AUTO: 31 PG (ref 27–31)
MCHC RBC AUTO-ENTMCNC: 33 G/DL (ref 30–37)
MCV RBC AUTO: 93.9 FL (ref 81–99)
MONOCYTES # BLD AUTO: 0.78 10*3/MM3 (ref 0–0.9)
MONOCYTES NFR BLD AUTO: 6.3 % (ref 0–12)
NEUTROPHILS # BLD AUTO: 9.65 10*3/MM3 (ref 2–6.9)
NEUTROPHILS NFR BLD AUTO: 78.6 % (ref 37–80)
NRBC BLD AUTO-RTO: 0 /100 WBC (ref 0–0)
PLATELET # BLD AUTO: 114 10*3/MM3 (ref 130–400)
POTASSIUM SERPL-SCNC: 4 MMOL/L (ref 3.5–5.1)
PROT SERPL-MCNC: 4.7 G/DL (ref 6.3–8.2)
RBC # BLD AUTO: 3.58 10*6/MM3 (ref 4.2–5.4)
SODIUM SERPL-SCNC: 134 MMOL/L (ref 137–145)
WBC # BLD AUTO: 12.29 10*3/MM3 (ref 4.8–10.8)

## 2019-03-12 PROCEDURE — 99214 OFFICE O/P EST MOD 30 MIN: CPT | Performed by: OBSTETRICS & GYNECOLOGY

## 2019-03-12 RX ORDER — METHYLPREDNISOLONE 4 MG/1
TABLET ORAL
Qty: 21 TABLET | Refills: 0 | Status: SHIPPED | OUTPATIENT
Start: 2019-03-12 | End: 2019-03-19 | Stop reason: HOSPADM

## 2019-03-13 ENCOUNTER — HOSPITAL ENCOUNTER (INPATIENT)
Facility: HOSPITAL | Age: 44
LOS: 6 days | Discharge: HOME | End: 2019-03-19
Attending: OBSTETRICS & GYNECOLOGY | Admitting: OBSTETRICS & GYNECOLOGY

## 2019-03-13 ENCOUNTER — APPOINTMENT (OUTPATIENT)
Dept: CARDIOLOGY | Facility: HOSPITAL | Age: 44
End: 2019-03-13

## 2019-03-13 ENCOUNTER — OFFICE VISIT (OUTPATIENT)
Dept: OBSTETRICS AND GYNECOLOGY | Facility: HOSPITAL | Age: 44
End: 2019-03-13

## 2019-03-13 ENCOUNTER — HOSPITAL ENCOUNTER (OUTPATIENT)
Dept: WOMENS IMAGING | Facility: HOSPITAL | Age: 44
Discharge: HOME | End: 2019-03-13
Admitting: OBSTETRICS & GYNECOLOGY

## 2019-03-13 ENCOUNTER — APPOINTMENT (OUTPATIENT)
Dept: GENERAL RADIOLOGY | Facility: HOSPITAL | Age: 44
End: 2019-03-13

## 2019-03-13 VITALS — SYSTOLIC BLOOD PRESSURE: 152 MMHG | WEIGHT: 293 LBS | DIASTOLIC BLOOD PRESSURE: 81 MMHG | BODY MASS INDEX: 48.74 KG/M2

## 2019-03-13 DIAGNOSIS — O13.3 GESTATIONAL HYPERTENSION, THIRD TRIMESTER: ICD-10-CM

## 2019-03-13 DIAGNOSIS — O09.523 ELDERLY MULTIGRAVIDA IN THIRD TRIMESTER: ICD-10-CM

## 2019-03-13 DIAGNOSIS — O12.13 PROTEINURIA AFFECTING PREGNANCY IN THIRD TRIMESTER: ICD-10-CM

## 2019-03-13 DIAGNOSIS — Q27.0 SINGLE UMBILICAL ARTERY: ICD-10-CM

## 2019-03-13 DIAGNOSIS — E66.01 MORBID OBESITY WITH BMI OF 45.0-49.9, ADULT (HCC): ICD-10-CM

## 2019-03-13 DIAGNOSIS — Z98.891 STATUS POST PRIMARY LOW TRANSVERSE CESAREAN SECTION: ICD-10-CM

## 2019-03-13 DIAGNOSIS — O09.523 ELDERLY MULTIGRAVIDA IN THIRD TRIMESTER: Primary | ICD-10-CM

## 2019-03-13 DIAGNOSIS — Z74.09 IMPAIRED FUNCTIONAL MOBILITY, BALANCE, GAIT, AND ENDURANCE: Primary | ICD-10-CM

## 2019-03-13 DIAGNOSIS — R80.9 NEPHROTIC RANGE PROTEINURIA: ICD-10-CM

## 2019-03-13 PROBLEM — O13.9 GESTATIONAL HYPERTENSION: Status: ACTIVE | Noted: 2019-03-13

## 2019-03-13 LAB
ABO GROUP BLD: NORMAL
ALBUMIN SERPL-MCNC: 2.79 G/DL (ref 3.2–4.8)
ALBUMIN/GLOB SERPL: 1.2 G/DL (ref 1.5–2.5)
ALP SERPL-CCNC: 161 U/L (ref 25–100)
ALT SERPL W P-5'-P-CCNC: 23 U/L (ref 7–40)
ANION GAP SERPL CALCULATED.3IONS-SCNC: 8 MMOL/L (ref 3–11)
ANTI-D, PASSIVE: NORMAL
AST SERPL-CCNC: 35 U/L (ref 0–33)
BH CV ECHO MEAS - AO MAX PG (FULL): 8.6 MMHG
BH CV ECHO MEAS - AO MAX PG: 17.8 MMHG
BH CV ECHO MEAS - AO MEAN PG (FULL): 4.6 MMHG
BH CV ECHO MEAS - AO MEAN PG: 9.8 MMHG
BH CV ECHO MEAS - AO ROOT AREA (BSA CORRECTED): 1.2
BH CV ECHO MEAS - AO ROOT AREA: 7.2 CM^2
BH CV ECHO MEAS - AO ROOT DIAM: 3 CM
BH CV ECHO MEAS - AO V2 MAX: 211.1 CM/SEC
BH CV ECHO MEAS - AO V2 MEAN: 147.5 CM/SEC
BH CV ECHO MEAS - AO V2 VTI: 45.7 CM
BH CV ECHO MEAS - AVA(I,A): 1.8 CM^2
BH CV ECHO MEAS - AVA(I,D): 1.8 CM^2
BH CV ECHO MEAS - AVA(V,A): 2.2 CM^2
BH CV ECHO MEAS - AVA(V,D): 2.2 CM^2
BH CV ECHO MEAS - BSA(HAYCOCK): 2.7 M^2
BH CV ECHO MEAS - BSA: 2.4 M^2
BH CV ECHO MEAS - BZI_BMI: 48.7 KILOGRAMS/M^2
BH CV ECHO MEAS - BZI_METRIC_HEIGHT: 170.2 CM
BH CV ECHO MEAS - BZI_METRIC_WEIGHT: 141.1 KG
BH CV ECHO MEAS - EDV(CUBED): 155.1 ML
BH CV ECHO MEAS - EDV(MOD-SP2): 120 ML
BH CV ECHO MEAS - EDV(MOD-SP4): 154 ML
BH CV ECHO MEAS - EDV(TEICH): 139.7 ML
BH CV ECHO MEAS - EF(CUBED): 62.9 %
BH CV ECHO MEAS - EF(MOD-SP2): 32.5 %
BH CV ECHO MEAS - EF(MOD-SP4): 35.7 %
BH CV ECHO MEAS - EF(TEICH): 53.9 %
BH CV ECHO MEAS - ESV(CUBED): 57.6 ML
BH CV ECHO MEAS - ESV(MOD-SP2): 81 ML
BH CV ECHO MEAS - ESV(MOD-SP4): 99 ML
BH CV ECHO MEAS - ESV(TEICH): 64.4 ML
BH CV ECHO MEAS - FS: 28.1 %
BH CV ECHO MEAS - IVS/LVPW: 1
BH CV ECHO MEAS - IVSD: 1.3 CM
BH CV ECHO MEAS - LA DIMENSION: 3.9 CM
BH CV ECHO MEAS - LA/AO: 1.3
BH CV ECHO MEAS - LAD MAJOR: 6.4 CM
BH CV ECHO MEAS - LV DIASTOLIC VOL/BSA (35-75): 63.1 ML/M^2
BH CV ECHO MEAS - LV MASS(C)D: 273.9 GRAMS
BH CV ECHO MEAS - LV MASS(C)DI: 112.3 GRAMS/M^2
BH CV ECHO MEAS - LV MAX PG: 9.2 MMHG
BH CV ECHO MEAS - LV MEAN PG: 5.2 MMHG
BH CV ECHO MEAS - LV SYSTOLIC VOL/BSA (12-30): 40.6 ML/M^2
BH CV ECHO MEAS - LV V1 MAX: 151.5 CM/SEC
BH CV ECHO MEAS - LV V1 MEAN: 103.5 CM/SEC
BH CV ECHO MEAS - LV V1 VTI: 27.7 CM
BH CV ECHO MEAS - LVIDD: 5.4 CM
BH CV ECHO MEAS - LVIDS: 3.9 CM
BH CV ECHO MEAS - LVLD AP2: 8.8 CM
BH CV ECHO MEAS - LVLD AP4: 9 CM
BH CV ECHO MEAS - LVLS AP2: 8.5 CM
BH CV ECHO MEAS - LVLS AP4: 8.3 CM
BH CV ECHO MEAS - LVOT AREA (M): 3.1 CM^2
BH CV ECHO MEAS - LVOT AREA: 3 CM^2
BH CV ECHO MEAS - LVOT DIAM: 2 CM
BH CV ECHO MEAS - LVPWD: 1.2 CM
BH CV ECHO MEAS - MED PEAK E' VEL: 7.8 CM/SEC
BH CV ECHO MEAS - MEDIAL E/E' RATIO: 14.3
BH CV ECHO MEAS - MV A MAX VEL: 129.8 CM/SEC
BH CV ECHO MEAS - MV DEC TIME: 0.2 SEC
BH CV ECHO MEAS - MV E MAX VEL: 113 CM/SEC
BH CV ECHO MEAS - MV E/A: 0.87
BH CV ECHO MEAS - PA ACC SLOPE: 1291 CM/SEC^2
BH CV ECHO MEAS - PA ACC TIME: 0.08 SEC
BH CV ECHO MEAS - PA PR(ACCEL): 41 MMHG
BH CV ECHO MEAS - SI(AO): 134.5 ML/M^2
BH CV ECHO MEAS - SI(CUBED): 40 ML/M^2
BH CV ECHO MEAS - SI(LVOT): 34.4 ML/M^2
BH CV ECHO MEAS - SI(MOD-SP2): 16 ML/M^2
BH CV ECHO MEAS - SI(MOD-SP4): 22.5 ML/M^2
BH CV ECHO MEAS - SI(TEICH): 30.9 ML/M^2
BH CV ECHO MEAS - SV(AO): 328 ML
BH CV ECHO MEAS - SV(CUBED): 97.5 ML
BH CV ECHO MEAS - SV(LVOT): 83.8 ML
BH CV ECHO MEAS - SV(MOD-SP2): 39 ML
BH CV ECHO MEAS - SV(MOD-SP4): 55 ML
BH CV ECHO MEAS - SV(TEICH): 75.3 ML
BH CV VAS BP RIGHT ARM: NORMAL MMHG
BILIRUB SERPL-MCNC: 0.3 MG/DL (ref 0.3–1.2)
BLD GP AB SCN SERPL QL: POSITIVE
BNP SERPL-MCNC: 273 PG/ML (ref 0–100)
BUN BLD-MCNC: 11 MG/DL (ref 9–23)
BUN/CREAT SERPL: 14.7 (ref 7–25)
CALCIUM SPEC-SCNC: 8.5 MG/DL (ref 8.7–10.4)
CHLORIDE SERPL-SCNC: 109 MMOL/L (ref 99–109)
CO2 SERPL-SCNC: 21 MMOL/L (ref 20–31)
CREAT BLD-MCNC: 0.75 MG/DL (ref 0.6–1.3)
DEPRECATED RDW RBC AUTO: 58 FL (ref 37–54)
ERYTHROCYTE [DISTWIDTH] IN BLOOD BY AUTOMATED COUNT: 16.9 % (ref 11.3–14.5)
GFR SERPL CREATININE-BSD FRML MDRD: 85 ML/MIN/1.73
GLOBULIN UR ELPH-MCNC: 2.4 GM/DL
GLUCOSE BLD-MCNC: 69 MG/DL (ref 70–100)
HCT VFR BLD AUTO: 36.4 % (ref 34.5–44)
HGB BLD-MCNC: 12.2 G/DL (ref 11.5–15.5)
LDH SERPL-CCNC: 473 U/L (ref 120–246)
LV EF 2D ECHO EST: 45 %
MAXIMAL PREDICTED HEART RATE: 179 BPM
MCH RBC QN AUTO: 31.4 PG (ref 27–31)
MCHC RBC AUTO-ENTMCNC: 33.5 G/DL (ref 32–36)
MCV RBC AUTO: 93.6 FL (ref 80–99)
PLATELET # BLD AUTO: 94 10*3/MM3 (ref 150–450)
PMV BLD AUTO: 11.5 FL (ref 6–12)
POTASSIUM BLD-SCNC: 4.2 MMOL/L (ref 3.5–5.5)
PROT SERPL-MCNC: 5.2 G/DL (ref 5.7–8.2)
RBC # BLD AUTO: 3.89 10*6/MM3 (ref 3.89–5.14)
RH BLD: NEGATIVE
SODIUM BLD-SCNC: 138 MMOL/L (ref 132–146)
STRESS TARGET HR: 152 BPM
T&S EXPIRATION DATE: NORMAL
URATE SERPL-MCNC: 5.2 MG/DL (ref 3.1–7.8)
WBC NRBC COR # BLD: 14.82 10*3/MM3 (ref 3.5–10.8)

## 2019-03-13 PROCEDURE — 80053 COMPREHEN METABOLIC PANEL: CPT | Performed by: OBSTETRICS & GYNECOLOGY

## 2019-03-13 PROCEDURE — 93306 TTE W/DOPPLER COMPLETE: CPT

## 2019-03-13 PROCEDURE — 76816 OB US FOLLOW-UP PER FETUS: CPT

## 2019-03-13 PROCEDURE — 76819 FETAL BIOPHYS PROFIL W/O NST: CPT

## 2019-03-13 PROCEDURE — 93010 ELECTROCARDIOGRAM REPORT: CPT | Performed by: INTERNAL MEDICINE

## 2019-03-13 PROCEDURE — 76820 UMBILICAL ARTERY ECHO: CPT

## 2019-03-13 PROCEDURE — 86900 BLOOD TYPING SEROLOGIC ABO: CPT | Performed by: OBSTETRICS & GYNECOLOGY

## 2019-03-13 PROCEDURE — 76820 UMBILICAL ARTERY ECHO: CPT | Performed by: OBSTETRICS & GYNECOLOGY

## 2019-03-13 PROCEDURE — 76816 OB US FOLLOW-UP PER FETUS: CPT | Performed by: OBSTETRICS & GYNECOLOGY

## 2019-03-13 PROCEDURE — 86870 RBC ANTIBODY IDENTIFICATION: CPT | Performed by: OBSTETRICS & GYNECOLOGY

## 2019-03-13 PROCEDURE — 86901 BLOOD TYPING SEROLOGIC RH(D): CPT

## 2019-03-13 PROCEDURE — 93005 ELECTROCARDIOGRAM TRACING: CPT | Performed by: OBSTETRICS & GYNECOLOGY

## 2019-03-13 PROCEDURE — 93306 TTE W/DOPPLER COMPLETE: CPT | Performed by: INTERNAL MEDICINE

## 2019-03-13 PROCEDURE — 25010000003 CEFAZOLIN IN DEXTROSE 2-4 GM/100ML-% SOLUTION: Performed by: OBSTETRICS & GYNECOLOGY

## 2019-03-13 PROCEDURE — 86900 BLOOD TYPING SEROLOGIC ABO: CPT

## 2019-03-13 PROCEDURE — 84550 ASSAY OF BLOOD/URIC ACID: CPT | Performed by: OBSTETRICS & GYNECOLOGY

## 2019-03-13 PROCEDURE — 59025 FETAL NON-STRESS TEST: CPT

## 2019-03-13 PROCEDURE — 25010000002 FUROSEMIDE PER 20 MG: Performed by: OBSTETRICS & GYNECOLOGY

## 2019-03-13 PROCEDURE — 83615 LACTATE (LD) (LDH) ENZYME: CPT | Performed by: OBSTETRICS & GYNECOLOGY

## 2019-03-13 PROCEDURE — 83880 ASSAY OF NATRIURETIC PEPTIDE: CPT | Performed by: OBSTETRICS & GYNECOLOGY

## 2019-03-13 PROCEDURE — 71046 X-RAY EXAM CHEST 2 VIEWS: CPT

## 2019-03-13 PROCEDURE — 76819 FETAL BIOPHYS PROFIL W/O NST: CPT | Performed by: OBSTETRICS & GYNECOLOGY

## 2019-03-13 PROCEDURE — 86850 RBC ANTIBODY SCREEN: CPT | Performed by: OBSTETRICS & GYNECOLOGY

## 2019-03-13 PROCEDURE — 85027 COMPLETE CBC AUTOMATED: CPT | Performed by: OBSTETRICS & GYNECOLOGY

## 2019-03-13 PROCEDURE — 86901 BLOOD TYPING SEROLOGIC RH(D): CPT | Performed by: OBSTETRICS & GYNECOLOGY

## 2019-03-13 RX ORDER — ONDANSETRON 4 MG/1
4 TABLET, FILM COATED ORAL EVERY 8 HOURS PRN
Status: DISCONTINUED | OUTPATIENT
Start: 2019-03-13 | End: 2019-03-19 | Stop reason: HOSPADM

## 2019-03-13 RX ORDER — ACETAMINOPHEN 325 MG/1
650 TABLET ORAL EVERY 4 HOURS PRN
Status: DISCONTINUED | OUTPATIENT
Start: 2019-03-13 | End: 2019-03-15

## 2019-03-13 RX ORDER — FUROSEMIDE 10 MG/ML
20 INJECTION INTRAMUSCULAR; INTRAVENOUS ONCE
Status: COMPLETED | OUTPATIENT
Start: 2019-03-13 | End: 2019-03-13

## 2019-03-13 RX ORDER — FUROSEMIDE 20 MG/1
20 TABLET ORAL
Status: DISCONTINUED | OUTPATIENT
Start: 2019-03-13 | End: 2019-03-14

## 2019-03-13 RX ORDER — ZOLPIDEM TARTRATE 5 MG/1
5 TABLET ORAL NIGHTLY PRN
Status: DISCONTINUED | OUTPATIENT
Start: 2019-03-13 | End: 2019-03-14

## 2019-03-13 RX ORDER — CEFAZOLIN SODIUM IN 0.9 % NACL 3 G/100 ML
3 INTRAVENOUS SOLUTION, PIGGYBACK (ML) INTRAVENOUS EVERY 8 HOURS
Status: DISCONTINUED | OUTPATIENT
Start: 2019-03-14 | End: 2019-03-14

## 2019-03-13 RX ORDER — DEXTROSE AND SODIUM CHLORIDE 5; .2 G/100ML; G/100ML
125 INJECTION, SOLUTION INTRAVENOUS CONTINUOUS
Status: DISCONTINUED | OUTPATIENT
Start: 2019-03-13 | End: 2019-03-14 | Stop reason: SDUPTHER

## 2019-03-13 RX ORDER — LIDOCAINE HYDROCHLORIDE 10 MG/ML
5 INJECTION, SOLUTION EPIDURAL; INFILTRATION; INTRACAUDAL; PERINEURAL AS NEEDED
Status: DISCONTINUED | OUTPATIENT
Start: 2019-03-13 | End: 2019-03-14

## 2019-03-13 RX ORDER — SODIUM CHLORIDE 0.9 % (FLUSH) 0.9 %
3 SYRINGE (ML) INJECTION EVERY 12 HOURS SCHEDULED
Status: DISCONTINUED | OUTPATIENT
Start: 2019-03-13 | End: 2019-03-16

## 2019-03-13 RX ORDER — SODIUM CHLORIDE 0.9 % (FLUSH) 0.9 %
1-10 SYRINGE (ML) INJECTION AS NEEDED
Status: DISCONTINUED | OUTPATIENT
Start: 2019-03-13 | End: 2019-03-15

## 2019-03-13 RX ORDER — CEFAZOLIN SODIUM 2 G/100ML
2 INJECTION, SOLUTION INTRAVENOUS ONCE
Status: COMPLETED | OUTPATIENT
Start: 2019-03-13 | End: 2019-03-13

## 2019-03-13 RX ADMIN — DEXTROSE AND SODIUM CHLORIDE 125 ML/HR: 5; 200 INJECTION, SOLUTION INTRAVENOUS at 18:13

## 2019-03-13 RX ADMIN — FUROSEMIDE 20 MG: 10 INJECTION, SOLUTION INTRAMUSCULAR; INTRAVENOUS at 22:09

## 2019-03-13 RX ADMIN — CEFAZOLIN SODIUM 2 G: 2 INJECTION, SOLUTION INTRAVENOUS at 18:13

## 2019-03-13 NOTE — PROGRESS NOTES
Next OB visit is tomorrow. Weight up 37 pounds since last visit here 15 days ago. Pulse ox 100% on room air. MHR = 97. Breathing labored with exertion but normal at rest. Reports sinus pressure but denies any headaches. Reports has had sinus infections recently. Note bulging area on right cheek; states was told by her OB that it is a hematoma because her platelets are low and has been rx another Medrol dose pack that she is waiting for her pharmacy to have ready. Denies any recent visual changes. Reports dull epigastric pain past 2-3 days. Reports significant increase in edema since here last. Note marked pitting edema up to hips. States had areas on left thigh and left calf that were weeping yesterday. States her OB put her on complete bedrest about 2 weeks ago.

## 2019-03-13 NOTE — H&P
[facility]  Obstetric History and Physical    Chief Complaint   Patient presents with   • Elevated Blood Pressure     Referring physician: Dr. Monse Wayne    Subjective     Patient is a 43 y.o. female  currently at 34w6d, who presents with documented nephrotic level proteinuria and gestational hypertension.  She has experienced a 37 pound weight gain over the past 15 days.  She has had some mild chest pain but denies shortness of breath.  She denies neck or arm pain, nausea or vomiting.    Additionally, she has been complaining of a nodule on her right face in the area of her right nasolabial fold.  This is a 2-3 cm area of induration and exquisite tenderness.  Consider the possibility of an early cellulitis.    Her prenatal care is notable for being an elderly primigravida. Her previous obstetric/gynecological history is noncontributory.    The following portions of the patients history were reviewed and updated as appropriate: current medications, allergies, past medical history, past surgical history, past family history, past social history and problem list .        Prenatal Information:   Maternal Prenatal Labs  Blood Type No results found for: ABO   Rh Status No results found for: RH   Antibody Screen No results found for: ABSCRN   Gonnorhea No results found for: GCCX   Chlamydia No results found for: CLAMYDCU   RPR No results found for: RPR   Syphilis Antibody No results found for: SYPHILIS   Rubella No results found for: RUBELLAIGGIN   Hepatitis B Surface Antigen No results found for: HEPBSAG   HIV-1 Antibody No results found for: LABHIV1   Hepatitis C Antibody No results found for: HEPCAB   Rapid Urin Drug Screen No results found for: AMPMETHU, BARBITSCNUR, LABBENZSCN, LABMETHSCN, LABOPIASCN, THCURSCR, COCAINEUR, AMPHETSCREEN, PROPOXSCN, BUPRENORSCNU, METAMPSCNUR, OXYCODONESCN, TRICYCLICSCN   Group B Strep Culture No results found for: GBSANTIGEN           External Prenatal Results     Pregnancy  Outside Results - Transcribed From Office Records - See Scanned Records For Details     Test Value Date Time    Hgb 11.1 g/dL 19 1626    Hct 33.6 % 19 1626    ABO A  18 1539    Rh Negative  18 1539    Antibody Screen Negative  18 1539    Glucose Fasting GTT       Glucose Tolerance Test 1 hour       Glucose Tolerance Test 3 hour       Gonorrhea (discrete) Negative  18     Chlamydia (discrete) Negative  18     RPR Non Reactive  18 1539    VDRL       Syphilis Antibody       Rubella 12.50 index 18 1539    HBsAg Negative  18 1539    Herpes Simplex Virus PCR       Herpes Simplex VIrus Culture       HIV Non Reactive  18 1539    Hep C RNA Quant PCR       Hep C Antibody 0.1 s/co ratio 18 1539    AFP       Group B Strep       GBS Susceptibility to Clindamycin       GBS Susceptibility to Erythromycin       Fetal Fibronectin       Genetic Testing, Maternal Blood             Drug Screening     Test Value Date Time    Urine Drug Screen       Amphetamine Screen       Barbiturate Screen       Benzodiazepine Screen       Methadone Screen       Phencyclidine Screen       Opiates Screen       THC Screen       Cocaine Screen       Propoxyphene Screen       Buprenorphine Screen       Methamphetamine Screen       Oxycodone Screen       Tricyclic Antidepressants Screen                     Past OB History:       Obstetric History       T0      L0     SAB0   TAB0   Ectopic0   Molar0   Multiple0   Live Births0       # Outcome Date GA Lbr Ap/2nd Weight Sex Delivery Anes PTL Lv   1 Current                   Past Medical History:  Past Medical History:   Diagnosis Date   • Asthma     As a child   • Carpal tunnel syndrome during pregnancy    • Frequent UTI    • Heart burn    • Hx of gastric ulcer    • Kidney stone    • Migraine    • Rh negative status during pregnancy 2018   • Ulcer of abdomen wall (CMS/HCC)    • Urinary tract infection       Past  Surgical History Past Surgical History:   Procedure Laterality Date   • KNEE ACL RECONSTRUCTION Left    • TONSILLECTOMY        Family History: Family History   Problem Relation Age of Onset   • Diabetes Father    • Hypertension Father    • Stroke Father    • Cancer Brother    • Heart disease Maternal Grandmother    • Stroke Paternal Grandmother       Social History:  reports that she has been smoking cigarettes.  She has been smoking about 0.50 packs per day. she has never used smokeless tobacco.   reports that she does not drink alcohol.   reports that she does not use drugs.   Allergies: Patient has no known allergies.  Current Medications:          No current facility-administered medications on file prior to encounter.      Current Outpatient Medications on File Prior to Encounter   Medication Sig Dispense Refill   • acetaminophen (TYLENOL) 325 MG tablet Take 650 mg by mouth Every 6 (Six) Hours As Needed for Mild Pain .     • Cetirizine-Pseudoephedrine (ZYRTEC-D PO) Take 10 mg by mouth Daily As Needed.     • ferrous sulfate 325 (65 FE) MG tablet Take 1 tablet by mouth 2 (Two) Times a Day Before Meals. 60 tablet 6   • methylPREDNISolone (MEDROL, JUNE,) 4 MG tablet Take as directed on package instructions. 21 tablet 0   • pantoprazole (PROTONIX) 40 MG EC tablet Take 1 tablet by mouth Daily. 30 tablet 3   • Prenatal Vit-Fe Fumarate-FA (PRENATAL VITAMIN 27-0.8) 27-0.8 MG tablet tablet Take 1 tablet by mouth Daily.         General ROS: Pertinent items are noted in HPI, all other systems reviewed and negative    Objective       Vital Signs Range for the last 24 hours  Temperature:     Temp Source:     BP: BP: (152)/(81) 152/81   Pulse:     Respirations:     SPO2:     O2 Amount (l/min):     O2 Devices     Weight: Weight:  [141 kg (311 lb 3.2 oz)] 141 kg (311 lb 3.2 oz)     Physical Examination: Eyes - pupils equal and reactive, extraocular eye movements intact, sclera anicteric  Mouth - mucous membranes moist, pharynx  normal without lesions and dental hygiene good  Neck - supple, no significant adenopathy, thyroid exam: thyroid is normal in size without nodules or tenderness  Chest - clear to auscultation, no wheezes, rales or rhonchi, symmetric air entry  Heart - normal rate, regular rhythm, normal S1, S2, rubs, clicks or gallops.  She does have a harsh grade 2-3/6 holosystolic murmur best heard at left upper sternal border.  This may well be merely a hyperdynamic flow murmur of pregnancy.  Maternal echocardiogram has been ordered.  Abdomen-soft, nontender, nondistended, no masses or organomegaly   Abdomen, Non-Tender  Neurological - alert, oriented, normal speech, no focal findings or movement disorder noted, DTR's normal and symmetric  Extremities - pedal edema 3-4 +    Fetal Heart Rate Assessment  Indication: Gestational hypertension with marked proteinuria   Start Time: 1557                end Time: 1633   NST Results: NST reactive, spontaneous CST negative.  Baseline fetal heart rate 120-130 bpm.  Average variability.  Multiple 15 x 15 accelerations noted.  No decelerations.  Contractions noted every 3-4 minutes without decelerations.        Laboratory Results:   Lab Results   Component Value Date    ALKPHOS 155 (H) 2019    ALT 20 2019    AST 20 2019    CREATININE 0.70 2019    BILITOT 0.1 (L) 2019     (H) 2019    URICACID 5.4 2019       WBC   Date Value Ref Range Status   2019 12.29 (H) 4.80 - 10.80 10*3/mm3 Final     RBC   Date Value Ref Range Status   2019 3.58 (L) 4.20 - 5.40 10*6/mm3 Final     Hemoglobin   Date Value Ref Range Status   2019 11.1 (L) 12.0 - 16.0 g/dL Final     Hematocrit   Date Value Ref Range Status   2019 33.6 (L) 37.0 - 47.0 % Final     MCV   Date Value Ref Range Status   2019 93.9 81.0 - 99.0 fL Final     MCH   Date Value Ref Range Status   2019 31.0 27.0 - 31.0 pg Final     MCHC   Date Value Ref Range Status    03/12/2019 33.0 30.0 - 37.0 g/dL Final     RDW   Date Value Ref Range Status   03/12/2019 16.8 (H) 11.5 - 14.5 % Final     Platelets   Date Value Ref Range Status   03/12/2019 114 (L) 130 - 400 10*3/mm3 Final     Neutrophil Rel %   Date Value Ref Range Status   03/12/2019 78.6 37.0 - 80.0 % Final     Lymphocyte Rel %   Date Value Ref Range Status   03/12/2019 13.0 10.0 - 50.0 % Final     Monocyte Rel %   Date Value Ref Range Status   03/12/2019 6.3 0.0 - 12.0 % Final     Eosinophil Rel %   Date Value Ref Range Status   03/12/2019 1.3 0.0 - 7.0 % Final     Basophil Rel %   Date Value Ref Range Status   03/12/2019 0.2 0.0 - 2.5 % Final     Neutrophils Absolute   Date Value Ref Range Status   03/12/2019 9.65 (H) 2.00 - 6.90 10*3/mm3 Final     Lymphocytes Absolute   Date Value Ref Range Status   03/12/2019 1.60 0.60 - 3.40 10*3/mm3 Final     Monocytes Absolute   Date Value Ref Range Status   03/12/2019 0.78 0.00 - 0.90 10*3/mm3 Final     Eosinophils Absolute   Date Value Ref Range Status   03/12/2019 0.16 0.00 - 0.70 10*3/mm3 Final     Basophils Absolute   Date Value Ref Range Status   03/12/2019 0.03 0.00 - 0.20 10*3/mm3 Final     nRBC   Date Value Ref Range Status   03/12/2019 0.0 0.0 - 0.0 /100 WBC Final             Brief Urine Lab Results  (Last result in the past 365 days)      Color   Clarity   Blood   Leuk Est   Nitrite   Protein   CREAT   Urine HCG        03/08/19 1939 Dark Yellow Clear Large Negative Negative 2000 mg/dL                 Radiology Review: PDC ultrasound performed today confirms a single viable fetus in cephalic presentation.  Fetus does measure at less than the 10th percentile consistent with IUGR.  Other Studies: Chest x-ray: PA and lateral, maternal echocardiogram, CBC, CMP, LDH, uric acid and BNP ordered.    Assessment/Plan       Gestational hypertension        Assessment:  1. Gestational hypertension with proteinuria at 34 weeks 6 days gestation.  It is unclear whether this is proteinuria  "related to gestational hypertension or conversely related to underlying renal disease.  2. 37 pound weight gain in the last 15 days.  3. Maternal morbid obesity.  4. Maternal complaints of chest pain.  5. Complaining of pain in the area of her right nasolabial fold, possible early cellulitis.  6. Elderly primigravida      Plan:  1.  Admit.  2.  Echocardiogram and EKG.  3.  Baseline laboratory studies as listed above.  4.  If echocardiogram shows no evidence of congestive failure, plan to proceed with initiation of magnesium sulfate for seizure prophylaxis followed by Warren bulb cervical ripening and induction of labor.  5.  IV Ancef for possible early cellulitis on her right face in the area of the right nasolabial fold.     Total time spent today with Tika  was 35 minutes (level 3).  Off this time, > 50% was spent face-to-face time coordinating care, answering her questions and counseling regarding pathophysiology of her presenting problem along with plans for any diagnostic work-up and treatment.        Ramses Mart \"Shalini\" FELA Pardo MD  3/13/2019  4:51 PM    "

## 2019-03-13 NOTE — PROGRESS NOTES
Chief Complaint   Patient presents with   • Routine Prenatal Visit     C/O swelling and sinus pressure. GBS done today       HPI:   Tika is a  currently at 34w5d who today reports the following:  Contractions - No; Leaking - No; Vaginal bleeding -  No; Swelling of extremities - YES. Good fetal movement - YES.    ROS:  GI: Nausea - No; Constipation - No; Diarrhea - No. RUQ pain - No    Neuro: Headache - YES; Visual disturbances - No.    Pertinent items are noted in HPI, all other systems reviewed and negative    Review of History:  The following portions of the patient's history were reviewed and updated as appropriate:problem list, current medications, allergies, past family history, past medical history, past social history and past surgical history.    Current Outpatient Medications on File Prior to Visit   Medication Sig Dispense Refill   • acetaminophen (TYLENOL) 325 MG tablet Take 650 mg by mouth Every 6 (Six) Hours As Needed for Mild Pain .     • Cetirizine-Pseudoephedrine (ZYRTEC-D PO) Take 10 mg by mouth Daily.     • ferrous sulfate 325 (65 FE) MG tablet Take 1 tablet by mouth 2 (Two) Times a Day Before Meals. 60 tablet 6   • pantoprazole (PROTONIX) 40 MG EC tablet Take 1 tablet by mouth Daily. 30 tablet 3   • Prenatal Vit-Fe Fumarate-FA (PRENATAL VITAMIN 27-0.8) 27-0.8 MG tablet tablet Take 1 tablet by mouth Daily.       No current facility-administered medications on file prior to visit.        EXAM:  /94   Wt (!) 141 kg (311 lb)   LMP  (LMP Unknown)   BMI 48.71 kg/m²     Gen: NAD, conversant  Pulm: No use of accessory muscles, normal respirations  Abdomen: Gravid, nontender, size = dates, + fetal cardiac activity  Ext: no edema, no rashes, WWP  Gait: normal for pregnancy  Psych: Mood, insight, judgement intact  SVE: Not performed     Lab Results   Component Value Date    ABO A 2018    RH Negative 2018    ABSCRN Negative 2018       Social History    Tobacco Use       Smoking status: Current Every Day Smoker        Packs/day: 0.50        Types: Cigarettes      Smokeless tobacco: Never Used      I have reviewed the prenatal labs and previous ultrasounds today.    MDM:  Diagnosis: Supervision of high risk pregnancy  Rh negative  AMA  Tobacco use in pregnancy   Known leiomyoma, stable  Subchorionic hemorrhage, resolved  2 vessel cord  H/o profound B12 deficiency  Carpal tunnel syndrome  Preeclampsia without severe features  Nephrotic range proteinuria  Sinus infection  Headache  Severe swelling / Anasarca   Tests/Orders/Rx today: GBS collected today    Meds Ordered: Medrol dose pack   Topics discussed: Preeclampsia  Proteinuria ~ 10,000 on last 24 hour collection  Swelling  Platelets stable at 119 on 3/8 - repeat labs today  Previously received steroids for lung maturity (2/11-12)  If steroid taper does not improve headache, plan for delivery at 35 weeks + magnesium therapy (PreE with severe features)   Tests next visit: PDC U/S   Next visit: Tomorrow     Mark Nowak MD  Obstetrics and Gynecology  [facility]

## 2019-03-14 ENCOUNTER — ANESTHESIA (OUTPATIENT)
Dept: LABOR AND DELIVERY | Facility: HOSPITAL | Age: 44
End: 2019-03-14

## 2019-03-14 ENCOUNTER — ANESTHESIA EVENT (OUTPATIENT)
Dept: LABOR AND DELIVERY | Facility: HOSPITAL | Age: 44
End: 2019-03-14

## 2019-03-14 LAB
ABO GROUP BLD: NORMAL
ABO GROUP BLD: NORMAL
ALBUMIN SERPL-MCNC: 2.39 G/DL (ref 3.2–4.8)
ALBUMIN/GLOB SERPL: 1.1 G/DL (ref 1.5–2.5)
ALP SERPL-CCNC: 150 U/L (ref 25–100)
ALT SERPL W P-5'-P-CCNC: 23 U/L (ref 7–40)
ANION GAP SERPL CALCULATED.3IONS-SCNC: 7 MMOL/L (ref 3–11)
AST SERPL-CCNC: 28 U/L (ref 0–33)
ATMOSPHERIC PRESS: ABNORMAL MMHG
ATMOSPHERIC PRESS: ABNORMAL MMHG
BASE EXCESS BLDCOA CALC-SCNC: -1.4 MMOL/L (ref 0–2)
BASE EXCESS BLDCOA CALC-SCNC: -2.5 MMOL/L (ref 0–2)
BDY SITE: ABNORMAL
BDY SITE: ABNORMAL
BILIRUB SERPL-MCNC: 0.2 MG/DL (ref 0.3–1.2)
BODY TEMPERATURE: 37 C
BODY TEMPERATURE: 37 C
BUN BLD-MCNC: 11 MG/DL (ref 9–23)
BUN/CREAT SERPL: 13.1 (ref 7–25)
CALCIUM SPEC-SCNC: 8.1 MG/DL (ref 8.7–10.4)
CHLORIDE SERPL-SCNC: 112 MMOL/L (ref 99–109)
CO2 BLDA-SCNC: 28.9 MMOL/L (ref 23–27)
CO2 BLDA-SCNC: 29.2 MMOL/L (ref 23–27)
CO2 SERPL-SCNC: 20 MMOL/L (ref 20–31)
CREAT BLD-MCNC: 0.84 MG/DL (ref 0.6–1.3)
DEPRECATED RDW RBC AUTO: 60.1 FL (ref 37–54)
DEPRECATED RDW RBC AUTO: 60.5 FL (ref 37–54)
DEPRECATED RDW RBC AUTO: 63.2 FL (ref 37–54)
ERYTHROCYTE [DISTWIDTH] IN BLOOD BY AUTOMATED COUNT: 17.3 % (ref 11.3–14.5)
ERYTHROCYTE [DISTWIDTH] IN BLOOD BY AUTOMATED COUNT: 17.4 % (ref 11.3–14.5)
ERYTHROCYTE [DISTWIDTH] IN BLOOD BY AUTOMATED COUNT: 17.8 % (ref 11.3–14.5)
FETAL BLEED: NEGATIVE
GFR SERPL CREATININE-BSD FRML MDRD: 75 ML/MIN/1.73
GLOBULIN UR ELPH-MCNC: 2.2 GM/DL
GLUCOSE BLD-MCNC: 98 MG/DL (ref 70–100)
HCO3 BLDCOA-SCNC: 27.1 MMOL/L (ref 16.9–20.5)
HCO3 BLDCOA-SCNC: 27.1 MMOL/L (ref 16.9–20.5)
HCT VFR BLD AUTO: 33.3 % (ref 34.5–44)
HCT VFR BLD AUTO: 34.2 % (ref 34.5–44)
HCT VFR BLD AUTO: 36.4 % (ref 34.5–44)
HGB BLD-MCNC: 10.8 G/DL (ref 11.5–15.5)
HGB BLD-MCNC: 11.4 G/DL (ref 11.5–15.5)
HGB BLD-MCNC: 11.5 G/DL (ref 11.5–15.5)
HGB BLDA-MCNC: 14.9 G/DL (ref 14–18)
HGB BLDA-MCNC: 15.4 G/DL (ref 14–18)
HOROWITZ INDEX BLD+IHG-RTO: 21 %
HOROWITZ INDEX BLD+IHG-RTO: 21 %
MCH RBC QN AUTO: 30.7 PG (ref 27–31)
MCH RBC QN AUTO: 30.9 PG (ref 27–31)
MCH RBC QN AUTO: 31.7 PG (ref 27–31)
MCHC RBC AUTO-ENTMCNC: 31.6 G/DL (ref 32–36)
MCHC RBC AUTO-ENTMCNC: 32.4 G/DL (ref 32–36)
MCHC RBC AUTO-ENTMCNC: 33.3 G/DL (ref 32–36)
MCV RBC AUTO: 95 FL (ref 80–99)
MCV RBC AUTO: 95.4 FL (ref 80–99)
MCV RBC AUTO: 97.3 FL (ref 80–99)
MODALITY: ABNORMAL
MODALITY: ABNORMAL
NOTE: ABNORMAL
NOTE: ABNORMAL
NUMBER OF DOSES: NORMAL
PCO2 BLDCOA: 60.1 MMHG (ref 43.3–54.9)
PCO2 BLDCOA: 67.6 MMHG (ref 43.3–54.9)
PH BLDCOA: 7.21 PH UNITS (ref 7.22–7.3)
PH BLDCOA: 7.26 PH UNITS (ref 7.22–7.3)
PLATELET # BLD AUTO: 76 10*3/MM3 (ref 150–450)
PLATELET # BLD AUTO: 87 10*3/MM3 (ref 150–450)
PLATELET # BLD AUTO: 91 10*3/MM3 (ref 150–450)
PMV BLD AUTO: 11.6 FL (ref 6–12)
PMV BLD AUTO: 11.7 FL (ref 6–12)
PMV BLD AUTO: 12.3 FL (ref 6–12)
PO2 BLDCOA: 1.6 MMHG (ref 11.5–43.3)
PO2 BLDCOA: 9 MMHG (ref 11.5–43.3)
POTASSIUM BLD-SCNC: 3.8 MMOL/L (ref 3.5–5.5)
POTASSIUM BLD-SCNC: 4 MMOL/L (ref 3.5–5.5)
POTASSIUM BLD-SCNC: 4.1 MMOL/L (ref 3.5–5.5)
PROT SERPL-MCNC: 4.6 G/DL (ref 5.7–8.2)
RBC # BLD AUTO: 3.49 10*6/MM3 (ref 3.89–5.14)
RBC # BLD AUTO: 3.6 10*6/MM3 (ref 3.89–5.14)
RBC # BLD AUTO: 3.74 10*6/MM3 (ref 3.89–5.14)
RH BLD: NEGATIVE
RH BLD: NEGATIVE
SAO2 % BLDCOA: 11 %
SAO2 % BLDCOA: ABNORMAL %
SODIUM BLD-SCNC: 139 MMOL/L (ref 132–146)
TROPONIN I SERPL-MCNC: 0.06 NG/ML
WBC NRBC COR # BLD: 12.49 10*3/MM3 (ref 3.5–10.8)
WBC NRBC COR # BLD: 14.39 10*3/MM3 (ref 3.5–10.8)
WBC NRBC COR # BLD: 16.79 10*3/MM3 (ref 3.5–10.8)

## 2019-03-14 PROCEDURE — 84132 ASSAY OF SERUM POTASSIUM: CPT | Performed by: OBSTETRICS & GYNECOLOGY

## 2019-03-14 PROCEDURE — 86900 BLOOD TYPING SEROLOGIC ABO: CPT | Performed by: OBSTETRICS & GYNECOLOGY

## 2019-03-14 PROCEDURE — 25010000002 CEFAZOLIN PER 500 MG: Performed by: OBSTETRICS & GYNECOLOGY

## 2019-03-14 PROCEDURE — 86900 BLOOD TYPING SEROLOGIC ABO: CPT

## 2019-03-14 PROCEDURE — 82805 BLOOD GASES W/O2 SATURATION: CPT

## 2019-03-14 PROCEDURE — 86901 BLOOD TYPING SEROLOGIC RH(D): CPT

## 2019-03-14 PROCEDURE — 51702 INSERT TEMP BLADDER CATH: CPT

## 2019-03-14 PROCEDURE — 80053 COMPREHEN METABOLIC PANEL: CPT | Performed by: OBSTETRICS & GYNECOLOGY

## 2019-03-14 PROCEDURE — 88307 TISSUE EXAM BY PATHOLOGIST: CPT | Performed by: OBSTETRICS & GYNECOLOGY

## 2019-03-14 PROCEDURE — 25010000002 PROMETHAZINE PER 50 MG: Performed by: OBSTETRICS & GYNECOLOGY

## 2019-03-14 PROCEDURE — 99254 IP/OBS CNSLTJ NEW/EST MOD 60: CPT | Performed by: INTERNAL MEDICINE

## 2019-03-14 PROCEDURE — 59514 CESAREAN DELIVERY ONLY: CPT | Performed by: OBSTETRICS & GYNECOLOGY

## 2019-03-14 PROCEDURE — 25010000002 FUROSEMIDE PER 20 MG: Performed by: OBSTETRICS & GYNECOLOGY

## 2019-03-14 PROCEDURE — 59025 FETAL NON-STRESS TEST: CPT

## 2019-03-14 PROCEDURE — 25010000002 BUTORPHANOL PER 1 MG: Performed by: OBSTETRICS & GYNECOLOGY

## 2019-03-14 PROCEDURE — 85027 COMPLETE CBC AUTOMATED: CPT | Performed by: OBSTETRICS & GYNECOLOGY

## 2019-03-14 PROCEDURE — 25010000002 MIDAZOLAM PER 1 MG: Performed by: NURSE ANESTHETIST, CERTIFIED REGISTERED

## 2019-03-14 PROCEDURE — 84484 ASSAY OF TROPONIN QUANT: CPT | Performed by: INTERNAL MEDICINE

## 2019-03-14 PROCEDURE — 25010000002 METOCLOPRAMIDE PER 10 MG: Performed by: NURSE ANESTHETIST, CERTIFIED REGISTERED

## 2019-03-14 PROCEDURE — 25010000002 MAGNESIUM SULFATE-LACT RINGERS 40 GM/580ML SOLUTION: Performed by: OBSTETRICS & GYNECOLOGY

## 2019-03-14 PROCEDURE — 25010000003 CEFAZOLIN IN DEXTROSE 2-4 GM/100ML-% SOLUTION: Performed by: OBSTETRICS & GYNECOLOGY

## 2019-03-14 PROCEDURE — 25010000002 FENTANYL CITRATE (PF) 100 MCG/2ML SOLUTION: Performed by: NURSE ANESTHETIST, CERTIFIED REGISTERED

## 2019-03-14 PROCEDURE — 25010000002 ONDANSETRON PER 1 MG: Performed by: NURSE ANESTHETIST, CERTIFIED REGISTERED

## 2019-03-14 PROCEDURE — 25010000003 MORPHINE PER 10 MG: Performed by: NURSE ANESTHETIST, CERTIFIED REGISTERED

## 2019-03-14 PROCEDURE — 25010000002 ONDANSETRON PER 1 MG: Performed by: OBSTETRICS & GYNECOLOGY

## 2019-03-14 PROCEDURE — 86901 BLOOD TYPING SEROLOGIC RH(D): CPT | Performed by: OBSTETRICS & GYNECOLOGY

## 2019-03-14 PROCEDURE — 85461 HEMOGLOBIN FETAL: CPT | Performed by: OBSTETRICS & GYNECOLOGY

## 2019-03-14 RX ORDER — NALOXONE HCL 0.4 MG/ML
0.4 VIAL (ML) INJECTION
Status: ACTIVE | OUTPATIENT
Start: 2019-03-14 | End: 2019-03-15

## 2019-03-14 RX ORDER — ONDANSETRON 2 MG/ML
4 INJECTION INTRAMUSCULAR; INTRAVENOUS EVERY 6 HOURS PRN
Status: DISCONTINUED | OUTPATIENT
Start: 2019-03-14 | End: 2019-03-19 | Stop reason: HOSPADM

## 2019-03-14 RX ORDER — BUPIVACAINE HYDROCHLORIDE 7.5 MG/ML
INJECTION, SOLUTION EPIDURAL; RETROBULBAR AS NEEDED
Status: DISCONTINUED | OUTPATIENT
Start: 2019-03-14 | End: 2019-03-14 | Stop reason: SURG

## 2019-03-14 RX ORDER — MAGNESIUM SULF/RINGERS LACTATE 40 G/500ML
2 PLASTIC BAG, INJECTION (ML) INTRAVENOUS CONTINUOUS
Status: DISCONTINUED | OUTPATIENT
Start: 2019-03-14 | End: 2019-03-14

## 2019-03-14 RX ORDER — OXYCODONE HYDROCHLORIDE AND ACETAMINOPHEN 5; 325 MG/1; MG/1
1 TABLET ORAL EVERY 4 HOURS PRN
Status: DISCONTINUED | OUTPATIENT
Start: 2019-03-14 | End: 2019-03-15

## 2019-03-14 RX ORDER — METOCLOPRAMIDE HYDROCHLORIDE 5 MG/ML
INJECTION INTRAMUSCULAR; INTRAVENOUS AS NEEDED
Status: DISCONTINUED | OUTPATIENT
Start: 2019-03-14 | End: 2019-03-14 | Stop reason: SURG

## 2019-03-14 RX ORDER — FUROSEMIDE 20 MG/1
20 TABLET ORAL
Status: DISCONTINUED | OUTPATIENT
Start: 2019-03-14 | End: 2019-03-15

## 2019-03-14 RX ORDER — BUTORPHANOL TARTRATE 1 MG/ML
1 INJECTION, SOLUTION INTRAMUSCULAR; INTRAVENOUS
Status: DISCONTINUED | OUTPATIENT
Start: 2019-03-14 | End: 2019-03-14

## 2019-03-14 RX ORDER — OXYCODONE AND ACETAMINOPHEN 10; 325 MG/1; MG/1
1 TABLET ORAL EVERY 4 HOURS PRN
Status: DISCONTINUED | OUTPATIENT
Start: 2019-03-14 | End: 2019-03-14

## 2019-03-14 RX ORDER — CARBOPROST TROMETHAMINE 250 UG/ML
250 INJECTION, SOLUTION INTRAMUSCULAR AS NEEDED
Status: DISCONTINUED | OUTPATIENT
Start: 2019-03-14 | End: 2019-03-15

## 2019-03-14 RX ORDER — OXYTOCIN-SODIUM CHLORIDE 0.9% IV SOLN 30 UNIT/500ML 30-0.9/5 UT/ML-%
2 SOLUTION INTRAVENOUS
Status: DISCONTINUED | OUTPATIENT
Start: 2019-03-14 | End: 2019-03-15

## 2019-03-14 RX ORDER — ACETAMINOPHEN 325 MG/1
650 TABLET ORAL EVERY 4 HOURS PRN
Status: DISCONTINUED | OUTPATIENT
Start: 2019-03-14 | End: 2019-03-19 | Stop reason: HOSPADM

## 2019-03-14 RX ORDER — SODIUM CHLORIDE, SODIUM LACTATE, POTASSIUM CHLORIDE, CALCIUM CHLORIDE 600; 310; 30; 20 MG/100ML; MG/100ML; MG/100ML; MG/100ML
125 INJECTION, SOLUTION INTRAVENOUS CONTINUOUS
Status: DISCONTINUED | OUTPATIENT
Start: 2019-03-14 | End: 2019-03-15

## 2019-03-14 RX ORDER — MISOPROSTOL 200 UG/1
800 TABLET ORAL AS NEEDED
Status: DISCONTINUED | OUTPATIENT
Start: 2019-03-14 | End: 2019-03-14

## 2019-03-14 RX ORDER — PROMETHAZINE HYDROCHLORIDE 25 MG/ML
12.5 INJECTION, SOLUTION INTRAMUSCULAR; INTRAVENOUS EVERY 4 HOURS PRN
Status: DISCONTINUED | OUTPATIENT
Start: 2019-03-14 | End: 2019-03-19 | Stop reason: HOSPADM

## 2019-03-14 RX ORDER — OXYTOCIN-SODIUM CHLORIDE 0.9% IV SOLN 30 UNIT/500ML 30-0.9/5 UT/ML-%
85 SOLUTION INTRAVENOUS ONCE
Status: DISCONTINUED | OUTPATIENT
Start: 2019-03-14 | End: 2019-03-15

## 2019-03-14 RX ORDER — FUROSEMIDE 10 MG/ML
20 INJECTION INTRAMUSCULAR; INTRAVENOUS EVERY 12 HOURS
Status: DISCONTINUED | OUTPATIENT
Start: 2019-03-14 | End: 2019-03-14

## 2019-03-14 RX ORDER — LABETALOL 200 MG/1
200 TABLET, FILM COATED ORAL EVERY 8 HOURS SCHEDULED
Status: DISCONTINUED | OUTPATIENT
Start: 2019-03-14 | End: 2019-03-19 | Stop reason: HOSPADM

## 2019-03-14 RX ORDER — CEFAZOLIN SODIUM IN 0.9 % NACL 3 G/100 ML
3 INTRAVENOUS SOLUTION, PIGGYBACK (ML) INTRAVENOUS ONCE
Status: DISCONTINUED | OUTPATIENT
Start: 2019-03-14 | End: 2019-03-14

## 2019-03-14 RX ORDER — LISINOPRIL 10 MG/1
10 TABLET ORAL
Status: DISCONTINUED | OUTPATIENT
Start: 2019-03-14 | End: 2019-03-18

## 2019-03-14 RX ORDER — PROMETHAZINE HYDROCHLORIDE 25 MG/ML
12.5 INJECTION, SOLUTION INTRAMUSCULAR; INTRAVENOUS EVERY 6 HOURS PRN
Status: DISCONTINUED | OUTPATIENT
Start: 2019-03-14 | End: 2019-03-14

## 2019-03-14 RX ORDER — MAGNESIUM SULF/RINGERS LACTATE 40 G/500ML
2 PLASTIC BAG, INJECTION (ML) INTRAVENOUS CONTINUOUS
Status: DISCONTINUED | OUTPATIENT
Start: 2019-03-14 | End: 2019-03-15

## 2019-03-14 RX ORDER — MORPHINE SULFATE 0.5 MG/ML
INJECTION, SOLUTION EPIDURAL; INTRATHECAL; INTRAVENOUS AS NEEDED
Status: DISCONTINUED | OUTPATIENT
Start: 2019-03-14 | End: 2019-03-14 | Stop reason: SURG

## 2019-03-14 RX ORDER — DIPHENHYDRAMINE HCL 25 MG
25 CAPSULE ORAL EVERY 4 HOURS PRN
Status: DISCONTINUED | OUTPATIENT
Start: 2019-03-14 | End: 2019-03-19 | Stop reason: HOSPADM

## 2019-03-14 RX ORDER — FENTANYL CITRATE 50 UG/ML
INJECTION, SOLUTION INTRAMUSCULAR; INTRAVENOUS AS NEEDED
Status: DISCONTINUED | OUTPATIENT
Start: 2019-03-14 | End: 2019-03-14 | Stop reason: SURG

## 2019-03-14 RX ORDER — METHYLERGONOVINE MALEATE 0.2 MG/ML
200 INJECTION INTRAVENOUS ONCE AS NEEDED
Status: DISCONTINUED | OUTPATIENT
Start: 2019-03-14 | End: 2019-03-14

## 2019-03-14 RX ORDER — DIPHENHYDRAMINE HYDROCHLORIDE 50 MG/ML
25 INJECTION INTRAMUSCULAR; INTRAVENOUS EVERY 4 HOURS PRN
Status: DISCONTINUED | OUTPATIENT
Start: 2019-03-14 | End: 2019-03-19 | Stop reason: HOSPADM

## 2019-03-14 RX ORDER — DEXTROSE AND SODIUM CHLORIDE 5; .2 G/100ML; G/100ML
75 INJECTION, SOLUTION INTRAVENOUS CONTINUOUS
Status: DISCONTINUED | OUTPATIENT
Start: 2019-03-14 | End: 2019-03-15

## 2019-03-14 RX ORDER — ONDANSETRON 4 MG/1
4 TABLET, FILM COATED ORAL EVERY 6 HOURS PRN
Status: DISCONTINUED | OUTPATIENT
Start: 2019-03-14 | End: 2019-03-14

## 2019-03-14 RX ORDER — HYDROMORPHONE HYDROCHLORIDE 1 MG/ML
0.5 INJECTION, SOLUTION INTRAMUSCULAR; INTRAVENOUS; SUBCUTANEOUS
Status: DISCONTINUED | OUTPATIENT
Start: 2019-03-14 | End: 2019-03-14

## 2019-03-14 RX ORDER — FUROSEMIDE 10 MG/ML
20 INJECTION INTRAMUSCULAR; INTRAVENOUS ONCE
Status: COMPLETED | OUTPATIENT
Start: 2019-03-14 | End: 2019-03-14

## 2019-03-14 RX ORDER — CEFAZOLIN SODIUM 2 G/100ML
2 INJECTION, SOLUTION INTRAVENOUS EVERY 8 HOURS
Status: DISCONTINUED | OUTPATIENT
Start: 2019-03-14 | End: 2019-03-14

## 2019-03-14 RX ORDER — OXYCODONE AND ACETAMINOPHEN 7.5; 325 MG/1; MG/1
2 TABLET ORAL EVERY 4 HOURS PRN
Status: DISCONTINUED | OUTPATIENT
Start: 2019-03-14 | End: 2019-03-14

## 2019-03-14 RX ORDER — OXYTOCIN 10 [USP'U]/ML
INJECTION, SOLUTION INTRAMUSCULAR; INTRAVENOUS AS NEEDED
Status: DISCONTINUED | OUTPATIENT
Start: 2019-03-14 | End: 2019-03-14 | Stop reason: SURG

## 2019-03-14 RX ORDER — PROMETHAZINE HYDROCHLORIDE 12.5 MG/1
12.5 SUPPOSITORY RECTAL EVERY 6 HOURS PRN
Status: DISCONTINUED | OUTPATIENT
Start: 2019-03-14 | End: 2019-03-14

## 2019-03-14 RX ORDER — TRISODIUM CITRATE DIHYDRATE AND CITRIC ACID MONOHYDRATE 500; 334 MG/5ML; MG/5ML
30 SOLUTION ORAL ONCE
Status: COMPLETED | OUTPATIENT
Start: 2019-03-14 | End: 2019-03-14

## 2019-03-14 RX ORDER — ONDANSETRON 2 MG/ML
INJECTION INTRAMUSCULAR; INTRAVENOUS AS NEEDED
Status: DISCONTINUED | OUTPATIENT
Start: 2019-03-14 | End: 2019-03-14 | Stop reason: SURG

## 2019-03-14 RX ORDER — BUTORPHANOL TARTRATE 1 MG/ML
1 INJECTION, SOLUTION INTRAMUSCULAR; INTRAVENOUS
Status: DISCONTINUED | OUTPATIENT
Start: 2019-03-14 | End: 2019-03-19 | Stop reason: HOSPADM

## 2019-03-14 RX ORDER — LABETALOL HYDROCHLORIDE 5 MG/ML
20-80 INJECTION, SOLUTION INTRAVENOUS
Status: ACTIVE | OUTPATIENT
Start: 2019-03-14 | End: 2019-03-15

## 2019-03-14 RX ORDER — DEXTROSE AND SODIUM CHLORIDE 5; .2 G/100ML; G/100ML
75 INJECTION, SOLUTION INTRAVENOUS CONTINUOUS
Status: DISCONTINUED | OUTPATIENT
Start: 2019-03-14 | End: 2019-03-14 | Stop reason: SDUPTHER

## 2019-03-14 RX ORDER — FAMOTIDINE 10 MG/ML
INJECTION, SOLUTION INTRAVENOUS AS NEEDED
Status: DISCONTINUED | OUTPATIENT
Start: 2019-03-14 | End: 2019-03-14 | Stop reason: SURG

## 2019-03-14 RX ORDER — MIDAZOLAM HYDROCHLORIDE 1 MG/ML
INJECTION INTRAMUSCULAR; INTRAVENOUS AS NEEDED
Status: DISCONTINUED | OUTPATIENT
Start: 2019-03-14 | End: 2019-03-14 | Stop reason: SURG

## 2019-03-14 RX ORDER — OXYTOCIN-SODIUM CHLORIDE 0.9% IV SOLN 30 UNIT/500ML 30-0.9/5 UT/ML-%
650 SOLUTION INTRAVENOUS ONCE
Status: DISCONTINUED | OUTPATIENT
Start: 2019-03-14 | End: 2019-03-15

## 2019-03-14 RX ORDER — PROMETHAZINE HYDROCHLORIDE 12.5 MG/1
12.5 TABLET ORAL EVERY 6 HOURS PRN
Status: DISCONTINUED | OUTPATIENT
Start: 2019-03-14 | End: 2019-03-14

## 2019-03-14 RX ORDER — CEFAZOLIN SODIUM 2 G/100ML
2 INJECTION, SOLUTION INTRAVENOUS EVERY 8 HOURS
Status: DISCONTINUED | OUTPATIENT
Start: 2019-03-15 | End: 2019-03-15

## 2019-03-14 RX ADMIN — SODIUM CHLORIDE 3 G: 9 INJECTION, SOLUTION INTRAVENOUS at 09:37

## 2019-03-14 RX ADMIN — SODIUM CHLORIDE 3 G: 9 INJECTION, SOLUTION INTRAVENOUS at 02:02

## 2019-03-14 RX ADMIN — MIDAZOLAM HYDROCHLORIDE 1 MG: 1 INJECTION, SOLUTION INTRAMUSCULAR; INTRAVENOUS at 10:23

## 2019-03-14 RX ADMIN — DEXTROSE AND SODIUM CHLORIDE 75 ML/HR: 5; 200 INJECTION, SOLUTION INTRAVENOUS at 09:00

## 2019-03-14 RX ADMIN — FAMOTIDINE 20 MG: 10 INJECTION, SOLUTION INTRAVENOUS at 09:50

## 2019-03-14 RX ADMIN — SODIUM CHLORIDE, POTASSIUM CHLORIDE, SODIUM LACTATE AND CALCIUM CHLORIDE 1000 ML: 600; 310; 30; 20 INJECTION, SOLUTION INTRAVENOUS at 09:42

## 2019-03-14 RX ADMIN — FUROSEMIDE 20 MG: 20 TABLET ORAL at 20:01

## 2019-03-14 RX ADMIN — MORPHINE SULFATE 0.15 MG: 0.5 INJECTION, SOLUTION EPIDURAL; INTRATHECAL; INTRAVENOUS at 10:23

## 2019-03-14 RX ADMIN — BUTORPHANOL TARTRATE 1 MG: 1 INJECTION, SOLUTION INTRAMUSCULAR; INTRAVENOUS at 03:50

## 2019-03-14 RX ADMIN — Medication 2 G/HR: at 09:37

## 2019-03-14 RX ADMIN — FENTANYL CITRATE 15 MCG: 50 INJECTION, SOLUTION INTRAMUSCULAR; INTRAVENOUS at 10:23

## 2019-03-14 RX ADMIN — BUPIVACAINE HYDROCHLORIDE 1.7 ML: 7.5 INJECTION, SOLUTION EPIDURAL; RETROBULBAR at 10:23

## 2019-03-14 RX ADMIN — SODIUM CHLORIDE, POTASSIUM CHLORIDE, SODIUM LACTATE AND CALCIUM CHLORIDE 1000 ML: 600; 310; 30; 20 INJECTION, SOLUTION INTRAVENOUS at 09:30

## 2019-03-14 RX ADMIN — PROMETHAZINE HYDROCHLORIDE 12.5 MG: 25 INJECTION INTRAMUSCULAR; INTRAVENOUS at 21:02

## 2019-03-14 RX ADMIN — ONDANSETRON 4 MG: 2 INJECTION INTRAMUSCULAR; INTRAVENOUS at 10:20

## 2019-03-14 RX ADMIN — BUTORPHANOL TARTRATE 1 MG: 1 INJECTION, SOLUTION INTRAMUSCULAR; INTRAVENOUS at 05:57

## 2019-03-14 RX ADMIN — CEFAZOLIN SODIUM 2 G: 2 INJECTION, SOLUTION INTRAVENOUS at 20:01

## 2019-03-14 RX ADMIN — ONDANSETRON 4 MG: 2 INJECTION INTRAMUSCULAR; INTRAVENOUS at 17:58

## 2019-03-14 RX ADMIN — OXYTOCIN 10 UNITS: 10 INJECTION, SOLUTION INTRAMUSCULAR; INTRAVENOUS at 10:43

## 2019-03-14 RX ADMIN — FUROSEMIDE 20 MG: 10 INJECTION, SOLUTION INTRAMUSCULAR; INTRAVENOUS at 02:02

## 2019-03-14 RX ADMIN — OXYCODONE AND ACETAMINOPHEN 1 TABLET: 5; 325 TABLET ORAL at 14:59

## 2019-03-14 RX ADMIN — LISINOPRIL 10 MG: 10 TABLET ORAL at 13:57

## 2019-03-14 RX ADMIN — FUROSEMIDE 20 MG: 10 INJECTION, SOLUTION INTRAMUSCULAR; INTRAVENOUS at 09:00

## 2019-03-14 RX ADMIN — OXYTOCIN 30 UNITS: 10 INJECTION, SOLUTION INTRAMUSCULAR; INTRAVENOUS at 11:02

## 2019-03-14 RX ADMIN — SODIUM CITRATE AND CITRIC ACID MONOHYDRATE 30 ML: 500; 334 SOLUTION ORAL at 10:07

## 2019-03-14 RX ADMIN — METOCLOPRAMIDE 10 MG: 5 INJECTION, SOLUTION INTRAMUSCULAR; INTRAVENOUS at 09:50

## 2019-03-14 RX ADMIN — OXYTOCIN 2 MILLI-UNITS/MIN: 10 INJECTION INTRAVENOUS at 09:04

## 2019-03-14 RX ADMIN — SODIUM CHLORIDE, POTASSIUM CHLORIDE, SODIUM LACTATE AND CALCIUM CHLORIDE: 600; 310; 30; 20 INJECTION, SOLUTION INTRAVENOUS at 11:02

## 2019-03-14 RX ADMIN — DEXTROSE AND SODIUM CHLORIDE 75 ML/HR: 5; 200 INJECTION, SOLUTION INTRAVENOUS at 21:02

## 2019-03-14 RX ADMIN — LABETALOL HYDROCHLORIDE 200 MG: 200 TABLET, FILM COATED ORAL at 13:57

## 2019-03-14 RX ADMIN — SODIUM CHLORIDE, PRESERVATIVE FREE 3 ML: 5 INJECTION INTRAVENOUS at 07:54

## 2019-03-14 RX ADMIN — SODIUM CHLORIDE, POTASSIUM CHLORIDE, SODIUM LACTATE AND CALCIUM CHLORIDE 125 ML/HR: 600; 310; 30; 20 INJECTION, SOLUTION INTRAVENOUS at 10:17

## 2019-03-14 NOTE — ANESTHESIA PREPROCEDURE EVALUATION
Anesthesia Evaluation     Patient summary reviewed and Nursing notes reviewed   NPO Solid Status: > 6 hours  NPO Liquid Status: > 8 hours           Airway   Mallampati: II  TM distance: <3 FB  Neck ROM: full  Difficult intubation highly probable  Dental      Pulmonary    (+) asthma,   Cardiovascular     (+) hypertension,       Neuro/Psych  (+) headaches (Migraines),     GI/Hepatic/Renal/Endo    (+) obesity, morbid obesity, GERD, PUD (H/O Ulcerative disease 2010, infrequent use of NSAIDS now),      Musculoskeletal (-) negative ROS    Abdominal    Substance History - negative use     OB/GYN    (+) Pregnant, Preeclampsia,         Other - negative ROS                     Anesthesia Plan    ASA 3     spinal and ITN     Anesthetic plan, all risks, benefits, and alternatives have been provided, discussed and informed consent has been obtained with: patient.

## 2019-03-14 NOTE — OP NOTE
Op Note    Referring physician: Dr. Monse Wayne      Preoperative diagnosis  1)IUP 35 weeks 0 days  2) gestational hypertension with proteinuria  3) maternal morbid obesity  4) advanced maternal age  5) gestational thrombocytopenia    Operative diagnosis  1)same    Procedure  1) primary  (LTUI)    Surgeon  1) Ramses Pardo III, M.D.    Anesthesia  Spinal    Indications  Patient is a 43-year-old  1 para 0 000 white female admitted at 34 weeks 6 days gestation with known gestational hypertension.  She had experienced a 37 pound weight gain in the past 2 weeks.  Patient also has an elevated BNP as well as an abnormal echocardiogram with an apical wall motion abnormality.  Decision was made to proceed with delivery.  Initially, plan was for Warren bulb cervical ripening with subsequent induction of labor.  However, given the patient's increased risk for  delivery based on her unfavorable cervix, and morbid obesity as well as an accelerating thrombocytopenia, a decision was made to proceed with  delivery once her platelet count was noted to be 76,000 on the day of delivery.  Concern was that any further delay in delivery may increase the risk of further thrombocytopenia and the need for general anesthesia as well as possible platelet transfusion.  As she is already exhibiting massive edema, we wish to minimize further fluid loads.    Intraoperative findings  1) 4 lb 5.8 oz female, apgars 7 and 8    Operative procedure  Patient was administered 3 g of Ancef for antibiotic prophylaxis.  Pneumatic compression stockings were placed for prophylaxis against venous thromboembolism.  Spinal anesthesia was administered.  The patient was prepped and draped in usual sterile fashion.  A transverse Sang Grimes skin incision was created and the fascia was exposed.  The fascia was opened transversely.  The recti were  in the midline.  The peritoneal cavity was entered sharply with the peritoneal  "incision being extended transversely.  An Antoine abdominal sidewall retractor was placed.  A low transverse uterine incision was initiated with a scalpel and extended horizontally by applying craniocaudad traction the lower segment.  A single infant in the cephalic presentation was encountered.  The head was delivered and the infant was bulb suctioned.  The body was delivered.  Cord was milked x4 and the cord was clamped and cut.  Cord segment was obtained for cord pH.  Cord blood was obtained.  The placenta was delivered.  Uterus was externalized and explored.  The uterine incision was closed with 2 layers of 0 PDS.  The first was a running layer and the second was a running imbricating layer.  The Antoine abdominal sidewall retractor was replaced and the gutters were irrigated and sponged free of blood and clots.  The anterior peritoneum was closed with running suture of 3-0 Vicryl.  The muscle bellies were plicated with figure-of-eight sutures of 3-0 Vicryl.  The fascia was closed with a running 0 PDS.  A flat Landry-De Dios drain was placed in the subcutaneous space and brought out through a separate stab incision.  The drain was sewn in place with 3-0 silk suture.  The subcutaneous space was closed with 2 running subcutaneous layers of 3-0 Vicryl.  The skin incision was closed with a running subcuticular layer of 4-0 Monocryl followed by application of Dermabond Prineo.  The patient was taken to the recovery room in stable condition.    EBL: 500 mL    Specimens: Placenta    Drains: buckner to strait drain    Complications: None      [facility] \"Shalini\" FELA Pardo MD  03/14/19  4:26 PM    "

## 2019-03-14 NOTE — ANESTHESIA PROCEDURE NOTES
Spinal Block      Indication:procedure for pain  Performed By  Anesthesiologist: Porsha Killian DO  CRNA: Monse Puri CRNA  Preanesthetic Checklist  Completed: patient identified, surgical consent, pre-op evaluation, timeout performed, IV checked, risks and benefits discussed and monitors and equipment checked  Spinal Block Prep:  Patient Position:sitting  Sterile Tech:cap, gloves, mask and sterile barriers  Prep:Betadine  Patient Monitoring:blood pressure monitoring, continuous pulse oximetry and EKG  Spinal Block Procedure  Approach:midline  Guidance:palpation technique  Location:L3-L4  Needle Type:Jimmy  Needle Gauge:25 G  Placement of Spinal needle event:cerebrospinal fluid aspirated  Paresthesia: no  Fluid Appearance:clear     Post Assessment  Patient Tolerance:patient tolerated the procedure well with no apparent complications  Complications no

## 2019-03-14 NOTE — PROGRESS NOTES
Labourist:    Taking over for Dr. Pardo  Spoke with Dr. Milligan and updated him as to chest - X-ray, ECHO, and labs.  At this time, based on Cardiology and MFM, it is recommended that we move to deliver Ms. Pugh  At this time she is cleared for an induction in an attempt of a vaginal birth.    Plan:    1.  20 IV Lasix now to pull some of the excess fluid off.  2.  Place a buckner catheter due to poor mobility   3.  Will place a Buckner Bulb for induction later tonight.  4.  Repeat labs in the a.m.  - currently thrombocytopenic  5.  Will hold on Magnesium until pitocin is started tomorrow morning.  6.  Discussed plan with Ms. Pugh and she agrees

## 2019-03-14 NOTE — PROGRESS NOTES
Laborist intrapartum note: 35 weeks 0 days gestation.  Referring physician: Dr. Monse Wayne  Chief complaint: Gestational hypertension    Discussed patient's clinical presentation with ZAHRA Franklin.  Patient has exhibited further decline in her platelet count to 76,000.  Given the patient's status with excessive extracellular fluid, decreased platelet count, morbid obesity, unfavorable cervix and significantly increased risk for  delivery, we feel that it is in the patient's best interest to proceed with  section at this time while her platelet count remains greater than 75,000.  Patient already receiving Ancef for her facial soft tissue infection.  She is due for her next dose at 9:30 AM.  We will proceed with the administration of Ancef and subsequently proceed with  section.  I have discussed the nature, risks, benefits and potential complications with the patient and her family.  They agreed to proceed.

## 2019-03-14 NOTE — PROGRESS NOTES
Laborist intrapartum progress note: 35 weeks 0 days gestation    Warren bulb placed at midnight.  Remains in place.  Will begin magnesium sulfate for seizure prophylaxis and low-dose Pitocin infusion.  Repeat CBC and potassium at noon.  Platelet count this morning 76,000.  AST and ALT both normal.    External fetal monitor: Category 1 tracing.  Baseline fetal heart rate 110-120 bpm.  Average variability with multiple 15 x 15 accelerations.  No decelerations.  No contractions seen.

## 2019-03-14 NOTE — ANESTHESIA POSTPROCEDURE EVALUATION
Patient: Tika Pugh    Procedure Summary     Date:  19 Room / Location:  [facility] LABOR DELIVERY 2 /  [facility]    Anesthesia Start:  101 Anesthesia Stop:      Procedure:   SECTION PRIMARY (N/A Abdomen) Diagnosis:      Surgeon:  Ramses Pardo III, MD Provider:  Porsha Killian DO    Anesthesia Type:  spinal, ITN ASA Status:  3          Anesthesia Type: spinal, ITN  Last vitals  /64   Temp 97.8   Pulse 92   Resp 17   SpO2 97     Post Anesthesia Care and Evaluation    Patient location during evaluation: bedside  Patient participation: complete - patient participated  Level of consciousness: awake and alert  Pain management: adequate  Airway patency: patent  Anesthetic complications: No anesthetic complications    Cardiovascular status: acceptable  Respiratory status: acceptable  Hydration status: acceptable

## 2019-03-14 NOTE — CONSULTS
[facility]   [address], [address]  [place], [place], [id number] [phone]  [email or web address]           INPATIENT CONSULTATION NOTE    Referring Physician: MD Edvin Dan III, [place] Iii, Md  [address]  [place] [address]    Patient Care Team:  Patient Care Team:  Carmen Power MD as PCP - General (Family Medicine)    Reason for consultation:   Chief Complaint   Patient presents with   • Elevated Blood Pressure          HPI:    Tika Pugh is a 43 y.o. female.  The patient has a history of cardiac murmur, asthma, and kidney stones.  She presented to [facility] labor and delivery after experiencing a 37 pound weight gain over the past 15 days.  She is currently 35 weeks and has been monitored for nephrotic syndrome and mild preeclampsia this pregnancy.  She reports prior to arrival she had mild constant chest pressure.  She is no longer having this chest pressure and notes that it improved after she was diuresed.  She also has complaints of lower extremity edema and dyspnea with exertion which have also improved with diuresis.  She denies any palpitations, lightheadedness, syncope, orthopnea, or PND.  She is a current every day smoker, smoking 1/2-1 pack/day since age 20.  She denies alcohol or drug use.  Her mother has a history of CAD, CHF, and VT.    PFSH:  Patient Active Problem List   Diagnosis   • Chronic nonintractable headache   • Gastroesophageal reflux disease   • Maternal tobacco use in first trimester   • Rh negative status during pregnancy   • Anemia due to vitamin B12 deficiency   • Uterine leiomyoma   • AMA (advanced maternal age) multigravida 35+   • Carpal tunnel syndrome of left wrist   • Nephrotic range proteinuria   • Single umbilical artery   • Poor fetal growth affecting management of mother in third trimester   • Morbid obesity with BMI of 45.0-49.9, adult (CMS/[facility])   • Pre-eclampsia in third trimester   •  Gestational hypertension       No current facility-administered medications on file prior to encounter.      Current Outpatient Medications on File Prior to Encounter   Medication Sig Dispense Refill   • acetaminophen (TYLENOL) 325 MG tablet Take 650 mg by mouth Every 6 (Six) Hours As Needed for Mild Pain .     • Cetirizine-Pseudoephedrine (ZYRTEC-D PO) Take 10 mg by mouth Daily As Needed.     • ferrous sulfate 325 (65 FE) MG tablet Take 1 tablet by mouth 2 (Two) Times a Day Before Meals. 60 tablet 6   • methylPREDNISolone (MEDROL, JUNE,) 4 MG tablet Take as directed on package instructions. 21 tablet 0   • pantoprazole (PROTONIX) 40 MG EC tablet Take 1 tablet by mouth Daily. 30 tablet 3   • Prenatal Vit-Fe Fumarate-FA (PRENATAL VITAMIN 27-0.8) 27-0.8 MG tablet tablet Take 1 tablet by mouth Daily.       No Known Allergies    Social History     Socioeconomic History   • Marital status: Single     Spouse name: Not on file   • Number of children: Not on file   • Years of education: Not on file   • Highest education level: Not on file   Tobacco Use   • Smoking status: Current Every Day Smoker     Packs/day: 0.50     Types: Cigarettes   • Smokeless tobacco: Never Used   Substance and Sexual Activity   • Alcohol use: No     Comment: Social   • Drug use: No   • Sexual activity: Yes     Partners: Male     Birth control/protection: None     Family History   Problem Relation Age of Onset   • Diabetes Father    • Hypertension Father    • Stroke Father    • Cancer Brother    • Heart disease Maternal Grandmother    • Stroke Paternal Grandmother        Review of Systems:  Positive for chest pressure, dyspnea with exertion, lower extremity edema.  All other systems reviewed are negative.         Objective:     Vital Sign Min/Max for last 24 hours  Temp  Min: 98.2 °F (36.8 °C)  Max: 98.7 °F (37.1 °C)   BP  Min: 134/61  Max: 165/76   Pulse  Min: 76  Max: 88   Resp  Min: 16  Max: 20   No Data Recorded   No Data Recorded       Intake/Output Summary (Last 24 hours) at 3/14/2019 0951  Last data filed at 3/14/2019 0600  Gross per 24 hour   Intake --   Output 2200 ml   Net -2200 ml           Vitals:    03/14/19 0718   BP: 135/62   Pulse: 81   Resp: 16   Temp: 98.2 °F (36.8 °C)       CONSTITUTIONAL: Well-nourished. In no acute distress.   SKIN: Warm and dry. No rashes noted  HEENT: Head is normocephalic and atraumatic. Mucous membranes are pink and moist.   NECK: Supple without masses or thyromegaly.   LUNGS: Normal effort. Clear to auscultation bilaterally without wheezing, rhonchi, or rales noted.   CARDIOVASCULAR: The heart has a regular rate with a normal S1 and S2. There is no gallop, rub, or click appreciated. ZOE at RUSB.  PERIPHERAL VASCULAR: Carotid upstroke is 2+ bilaterally and without bruits. Radial pulses are 2+ bilaterally. Posterior tibial pulses are 2+ and symmetrical. There is pitting lower extremity edema bilaterally.  ABDOMEN: Soft with no tenderness with palpitation. No hepatosplenomegaly  MUSCULOSKELETAL:  No digital cyanosis  NEUROLOGICAL: Nonfocal.  PSYCHIATRIC: Alert, orientated x 3, appropriate affect     Labs:  No results found for: CKTOTAL, CKMB, CKMBINDEX, TROPONINI, TROPONINT    Glucose   Date Value Ref Range Status   03/14/2019 98 70 - 100 mg/dL Final     BUN   Date Value Ref Range Status   03/14/2019 11 9 - 23 mg/dL Final     Creatinine   Date Value Ref Range Status   03/14/2019 0.84 0.60 - 1.30 mg/dL Final     Sodium   Date Value Ref Range Status   03/14/2019 139 132 - 146 mmol/L Final     Potassium   Date Value Ref Range Status   03/14/2019 3.8 3.5 - 5.5 mmol/L Final     Chloride   Date Value Ref Range Status   03/14/2019 112 (H) 99 - 109 mmol/L Final     CO2   Date Value Ref Range Status   03/14/2019 20.0 20.0 - 31.0 mmol/L Final     Calcium   Date Value Ref Range Status   03/14/2019 8.1 (L) 8.7 - 10.4 mg/dL Final     Total Protein   Date Value Ref Range Status   03/14/2019 4.6 (L) 5.7 - 8.2 g/dL Final      Albumin   Date Value Ref Range Status   03/14/2019 2.39 (L) 3.20 - 4.80 g/dL Final     ALT (SGPT)   Date Value Ref Range Status   03/14/2019 23 7 - 40 U/L Final     AST (SGOT)   Date Value Ref Range Status   03/14/2019 28 0 - 33 U/L Final     Alkaline Phosphatase   Date Value Ref Range Status   03/14/2019 150 (H) 25 - 100 U/L Final     Total Bilirubin   Date Value Ref Range Status   03/14/2019 0.2 (L) 0.3 - 1.2 mg/dL Final     eGFR Non  Amer   Date Value Ref Range Status   03/14/2019 75 >60 mL/min/1.73 Final     A/G Ratio   Date Value Ref Range Status   03/12/2019 0.9 (L) 1.0 - 2.0 g/dL Final     BUN/Creatinine Ratio   Date Value Ref Range Status   03/14/2019 13.1 7.0 - 25.0 Final     Anion Gap   Date Value Ref Range Status   03/14/2019 7.0 3.0 - 11.0 mmol/L Final       No results found for: CHOL  No results found for: TRIG  No results found for: HDL  No results found for: LDL  No components found for: LDLDIRECTC      WBC   Date Value Ref Range Status   03/14/2019 12.49 (H) 3.50 - 10.80 10*3/mm3 Final   03/12/2019 12.29 (H) 4.80 - 10.80 10*3/mm3 Final     RBC   Date Value Ref Range Status   03/14/2019 3.49 (L) 3.89 - 5.14 10*6/mm3 Final   03/12/2019 3.58 (L) 4.20 - 5.40 10*6/mm3 Final     Hemoglobin   Date Value Ref Range Status   03/14/2019 10.8 (L) 11.5 - 15.5 g/dL Final     Hematocrit   Date Value Ref Range Status   03/14/2019 33.3 (L) 34.5 - 44.0 % Final     MCV   Date Value Ref Range Status   03/14/2019 95.4 80.0 - 99.0 fL Final     MCH   Date Value Ref Range Status   03/14/2019 30.9 27.0 - 31.0 pg Final     MCHC   Date Value Ref Range Status   03/14/2019 32.4 32.0 - 36.0 g/dL Final     RDW   Date Value Ref Range Status   03/14/2019 17.3 (H) 11.3 - 14.5 % Final     RDW-SD   Date Value Ref Range Status   03/14/2019 60.5 (H) 37.0 - 54.0 fl Final     MPV   Date Value Ref Range Status   03/14/2019 11.7 6.0 - 12.0 fL Final     Platelets   Date Value Ref Range Status   03/14/2019 76 (L) 150 - 450 10*3/mm3  Final     Neutrophil Rel %   Date Value Ref Range Status   03/12/2019 78.6 37.0 - 80.0 % Final     Lymphocyte Rel %   Date Value Ref Range Status   03/12/2019 13.0 10.0 - 50.0 % Final     Monocyte Rel %   Date Value Ref Range Status   03/12/2019 6.3 0.0 - 12.0 % Final     Eosinophil Rel %   Date Value Ref Range Status   03/12/2019 1.3 0.0 - 7.0 % Final     Basophil Rel %   Date Value Ref Range Status   03/12/2019 0.2 0.0 - 2.5 % Final     Neutrophils Absolute   Date Value Ref Range Status   03/12/2019 9.65 (H) 2.00 - 6.90 10*3/mm3 Final     Lymphocytes Absolute   Date Value Ref Range Status   03/12/2019 1.60 0.60 - 3.40 10*3/mm3 Final     Monocytes Absolute   Date Value Ref Range Status   03/12/2019 0.78 0.00 - 0.90 10*3/mm3 Final     Eosinophils Absolute   Date Value Ref Range Status   03/12/2019 0.16 0.00 - 0.70 10*3/mm3 Final     Basophils Absolute   Date Value Ref Range Status   03/12/2019 0.03 0.00 - 0.20 10*3/mm3 Final     nRBC   Date Value Ref Range Status   03/12/2019 0.0 0.0 - 0.0 /100 WBC Final         Diagnostic Data:    EKG:  Normal Sinus Rhythm, Anterior infarct , age undetermined    Tele:  NSR    TTE 3/2019  · Estimated EF = 45-50%  · LV apical hypokinesia.  · Left ventricular wall thickness is consistent with mild concentric hypertrophy.  · Mild mitral valve regurgitation is present         Assessment and Plan:   ASSESSMENT:  -Chest pressure, dyspnea, volume overload. Acute systolic HF symptoms. Improving with diuresis.  Echocardiogram with EF 45- 50% with LV apical hypokinesia and mild MR. EKG with PRWP. Troponin 0.059 this AM. Risk factors for CAD include tobacco abuse and familial history. Findings concerning for peripartum cardiomyopathy vs spontaneous coronary artery dissection (SCAD) vs traditional ACS/NSTEMI. The clinical presentation and nature of the wall motion abnormality on echo seems most consistent with SCAD at this time.   -35 weeks pregnant, now s/p C  section  -Obesity  -Hypertension    PLAN:  -Discussed clinical situation with patient and Dr Pardo.   -Recommend treatment for HF including beta blocker, lisinopril, Lasix  -Recommend ischemic evaluation with a left heart cath next Tuesday  -Recommend treatment for elevated troponin with beta blocker. ASA when platelets improve. Long term may need to discuss risk/benefit of also being on Plavix, especially if patient wishes to breastfeed  -Repeat troponin in AM      Scribed for Dr. Spence by LISSETH Larsen. 3/14/2019  11:10 AM     Electronically signed by LISSETH Larsen, 03/14/19, 11:10 AM.      I, Eb Spence MD, personally performed the services as scribed by the above named individual. I have made any necessary edits and it is both accurate and complete.     Eb Spence MD, MSc, [facility]  Interventional Cardiology  [facility] at [facility]

## 2019-03-14 NOTE — PROCEDURES
43 y.o.  OB History      Para Term  AB Living    1 0 0 0 0 0    SAB TAB Ectopic Molar Multiple Live Births    0 0 0 0 0 0       Presents  35 0/7 weeks as an induction of labour due to pre-eclampsia and severe edema  Her primary OB requests a Warren Bulb placement to initiate the induction of labour.    Fetal Heart Rate Assessment   Method: Fetal HR Assessment Method: external   Beats/min: Fetal HR (beats/min): 140   Baseline: Fetal Heart Baseline Rate: normal range   Varibility: Fetal HR Variability: moderate (amplitude range 6 to 25 bpm)   Accels: Fetal HR Accelerations: greater than/equal to 15 bpm, lasting at least 15 seconds   Decels: Fetal HR Decelerations: absent   Tracing Category: Fetal HR Tracing Category: Category I     TOCO:  None  SVE:  Closed/60/-3    A Warren Bulb was placed without difficulties with 60 cc of sterile saline.  The patient tolerated the procedure well.

## 2019-03-14 NOTE — PLAN OF CARE
Problem: Labor (Cervical Ripen, Induct, Augment) (Adult,Obstetrics,Pediatric)  Goal: Signs and Symptoms of Listed Potential Problems Will be Absent, Minimized or Managed (Labor)  Outcome: Unable to achieve outcome(s) by discharge Date Met: 03/14/19 03/14/19 0922   Goal/Outcome Evaluation   Problems Assessed (Labor) all   Problems Present (Labor) (low platlets, high blood pressure )

## 2019-03-14 NOTE — PLAN OF CARE
Problem:  Delivery (Adult,Obstetrics,Pediatric)  Goal: Signs and Symptoms of Listed Potential Problems Will be Absent, Minimized or Managed ( Delivery)  Outcome: Ongoing (interventions implemented as appropriate)   19 1158   Goal/Outcome Evaluation   Problems Assessed ( Delivery) all   Problems Present ( Delivery) none

## 2019-03-15 LAB
ALBUMIN SERPL-MCNC: 2.34 G/DL (ref 3.2–4.8)
ALBUMIN/GLOB SERPL: 1.1 G/DL (ref 1.5–2.5)
ALP SERPL-CCNC: 127 U/L (ref 25–100)
ALP SERPL-CCNC: 128 U/L (ref 25–100)
ALT SERPL W P-5'-P-CCNC: 12 U/L (ref 7–40)
ALT SERPL W P-5'-P-CCNC: 12 U/L (ref 7–40)
ANION GAP SERPL CALCULATED.3IONS-SCNC: 6 MMOL/L (ref 3–11)
AST SERPL-CCNC: 20 U/L (ref 0–33)
AST SERPL-CCNC: 20 U/L (ref 0–33)
BASOPHILS # BLD MANUAL: 0 10*3/MM3 (ref 0–0.2)
BASOPHILS NFR BLD AUTO: 0 % (ref 0–1)
BILIRUB SERPL-MCNC: 0.2 MG/DL (ref 0.3–1.2)
BILIRUB SERPL-MCNC: 0.2 MG/DL (ref 0.3–1.2)
BUN BLD-MCNC: 13 MG/DL (ref 9–23)
BUN/CREAT SERPL: 13.7 (ref 7–25)
CALCIUM SPEC-SCNC: 7.5 MG/DL (ref 8.7–10.4)
CHLORIDE SERPL-SCNC: 104 MMOL/L (ref 99–109)
CO2 SERPL-SCNC: 22 MMOL/L (ref 20–31)
CREAT BLD-MCNC: 0.93 MG/DL (ref 0.6–1.3)
CREAT BLD-MCNC: 0.95 MG/DL (ref 0.6–1.3)
DEPRECATED RDW RBC AUTO: 61.7 FL (ref 37–54)
EOSINOPHIL # BLD MANUAL: 0 10*3/MM3 (ref 0.1–0.3)
EOSINOPHIL NFR BLD MANUAL: 0 % (ref 0–3)
ERYTHROCYTE [DISTWIDTH] IN BLOOD BY AUTOMATED COUNT: 17.6 % (ref 11.3–14.5)
GFR SERPL CREATININE-BSD FRML MDRD: 65 ML/MIN/1.73
GLOBULIN UR ELPH-MCNC: 2.1 GM/DL
GLUCOSE BLD-MCNC: 102 MG/DL (ref 70–100)
GP B STREP DNA SPEC QL NAA+PROBE: NEGATIVE
HCT VFR BLD AUTO: 31 % (ref 34.5–44)
HGB BLD-MCNC: 9.9 G/DL (ref 11.5–15.5)
LDH SERPL-CCNC: 336 U/L (ref 120–246)
LYMPHOCYTES # BLD MANUAL: 1.46 10*3/MM3 (ref 0.6–4.8)
LYMPHOCYTES NFR BLD MANUAL: 2 % (ref 0–12)
LYMPHOCYTES NFR BLD MANUAL: 9 % (ref 24–44)
MCH RBC QN AUTO: 30.8 PG (ref 27–31)
MCHC RBC AUTO-ENTMCNC: 31.9 G/DL (ref 32–36)
MCV RBC AUTO: 96.6 FL (ref 80–99)
MONOCYTES # BLD AUTO: 0.32 10*3/MM3 (ref 0–1)
NEUTROPHILS # BLD AUTO: 14.42 10*3/MM3 (ref 1.5–8.3)
NEUTROPHILS NFR BLD MANUAL: 87 % (ref 41–71)
NEUTS BAND NFR BLD MANUAL: 2 % (ref 0–5)
PLAT MORPH BLD: NORMAL
PLATELET # BLD AUTO: 102 10*3/MM3 (ref 150–450)
PMV BLD AUTO: 11.8 FL (ref 6–12)
POTASSIUM BLD-SCNC: 4.5 MMOL/L (ref 3.5–5.5)
PROT SERPL-MCNC: 4.4 G/DL (ref 5.7–8.2)
RBC # BLD AUTO: 3.21 10*6/MM3 (ref 3.89–5.14)
RBC MORPH BLD: NORMAL
SODIUM BLD-SCNC: 132 MMOL/L (ref 132–146)
TROPONIN I SERPL-MCNC: 0.05 NG/ML
URATE SERPL-MCNC: 6.3 MG/DL (ref 3.1–7.8)
WBC MORPH BLD: NORMAL
WBC NRBC COR # BLD: 16.2 10*3/MM3 (ref 3.5–10.8)

## 2019-03-15 PROCEDURE — 82565 ASSAY OF CREATININE: CPT | Performed by: OBSTETRICS & GYNECOLOGY

## 2019-03-15 PROCEDURE — 99232 SBSQ HOSP IP/OBS MODERATE 35: CPT | Performed by: OBSTETRICS & GYNECOLOGY

## 2019-03-15 PROCEDURE — 85027 COMPLETE CBC AUTOMATED: CPT | Performed by: OBSTETRICS & GYNECOLOGY

## 2019-03-15 PROCEDURE — 94799 UNLISTED PULMONARY SVC/PX: CPT

## 2019-03-15 PROCEDURE — 83615 LACTATE (LD) (LDH) ENZYME: CPT | Performed by: OBSTETRICS & GYNECOLOGY

## 2019-03-15 PROCEDURE — 84484 ASSAY OF TROPONIN QUANT: CPT | Performed by: INTERNAL MEDICINE

## 2019-03-15 PROCEDURE — 82247 BILIRUBIN TOTAL: CPT | Performed by: OBSTETRICS & GYNECOLOGY

## 2019-03-15 PROCEDURE — 25010000003 CEFAZOLIN IN DEXTROSE 2-4 GM/100ML-% SOLUTION: Performed by: OBSTETRICS & GYNECOLOGY

## 2019-03-15 PROCEDURE — 84460 ALANINE AMINO (ALT) (SGPT): CPT | Performed by: OBSTETRICS & GYNECOLOGY

## 2019-03-15 PROCEDURE — 84550 ASSAY OF BLOOD/URIC ACID: CPT | Performed by: OBSTETRICS & GYNECOLOGY

## 2019-03-15 PROCEDURE — 84075 ASSAY ALKALINE PHOSPHATASE: CPT | Performed by: OBSTETRICS & GYNECOLOGY

## 2019-03-15 PROCEDURE — 25010000002 RHO D IMMUNE GLOBULIN 1500 UNIT/2ML SOLUTION PREFILLED SYRINGE: Performed by: OBSTETRICS & GYNECOLOGY

## 2019-03-15 PROCEDURE — 99232 SBSQ HOSP IP/OBS MODERATE 35: CPT | Performed by: INTERNAL MEDICINE

## 2019-03-15 PROCEDURE — 80053 COMPREHEN METABOLIC PANEL: CPT | Performed by: OBSTETRICS & GYNECOLOGY

## 2019-03-15 PROCEDURE — 25010000002 MAGNESIUM SULFATE-LACT RINGERS 40 GM/580ML SOLUTION: Performed by: OBSTETRICS & GYNECOLOGY

## 2019-03-15 PROCEDURE — 84450 TRANSFERASE (AST) (SGOT): CPT | Performed by: OBSTETRICS & GYNECOLOGY

## 2019-03-15 PROCEDURE — 85007 BL SMEAR W/DIFF WBC COUNT: CPT | Performed by: OBSTETRICS & GYNECOLOGY

## 2019-03-15 PROCEDURE — 25010000002 ONDANSETRON PER 1 MG: Performed by: OBSTETRICS & GYNECOLOGY

## 2019-03-15 RX ORDER — FUROSEMIDE 20 MG/1
20 TABLET ORAL DAILY
Status: DISCONTINUED | OUTPATIENT
Start: 2019-03-16 | End: 2019-03-17

## 2019-03-15 RX ORDER — HYDROCODONE BITARTRATE AND ACETAMINOPHEN 5; 325 MG/1; MG/1
1 TABLET ORAL EVERY 6 HOURS PRN
Status: DISCONTINUED | OUTPATIENT
Start: 2019-03-15 | End: 2019-03-19 | Stop reason: HOSPADM

## 2019-03-15 RX ORDER — HYDROCODONE BITARTRATE AND ACETAMINOPHEN 5; 325 MG/1; MG/1
2 TABLET ORAL EVERY 6 HOURS PRN
Status: DISCONTINUED | OUTPATIENT
Start: 2019-03-15 | End: 2019-03-15

## 2019-03-15 RX ORDER — ALBUTEROL SULFATE 2.5 MG/3ML
2.5 SOLUTION RESPIRATORY (INHALATION) EVERY 6 HOURS PRN
Status: DISCONTINUED | OUTPATIENT
Start: 2019-03-15 | End: 2019-03-19 | Stop reason: HOSPADM

## 2019-03-15 RX ADMIN — CEFAZOLIN SODIUM 2 G: 2 INJECTION, SOLUTION INTRAVENOUS at 03:49

## 2019-03-15 RX ADMIN — HYDROCODONE BITARTRATE AND ACETAMINOPHEN 1 TABLET: 5; 325 TABLET ORAL at 21:23

## 2019-03-15 RX ADMIN — ONDANSETRON 4 MG: 2 INJECTION INTRAMUSCULAR; INTRAVENOUS at 00:00

## 2019-03-15 RX ADMIN — FUROSEMIDE 20 MG: 20 TABLET ORAL at 08:50

## 2019-03-15 RX ADMIN — ALBUTEROL SULFATE 2.5 MG: 2.5 SOLUTION RESPIRATORY (INHALATION) at 16:14

## 2019-03-15 RX ADMIN — LISINOPRIL 10 MG: 10 TABLET ORAL at 08:50

## 2019-03-15 RX ADMIN — ALBUTEROL SULFATE 2.5 MG: 2.5 SOLUTION RESPIRATORY (INHALATION) at 10:51

## 2019-03-15 RX ADMIN — MAGNESIUM SULFATE HEPTAHYDRATE 2 G/HR: 500 INJECTION, SOLUTION INTRAMUSCULAR; INTRAVENOUS at 03:49

## 2019-03-15 RX ADMIN — LABETALOL HYDROCHLORIDE 200 MG: 200 TABLET, FILM COATED ORAL at 00:01

## 2019-03-15 RX ADMIN — LABETALOL HYDROCHLORIDE 200 MG: 200 TABLET, FILM COATED ORAL at 08:50

## 2019-03-15 RX ADMIN — HUMAN RHO(D) IMMUNE GLOBULIN 1500 UNITS: 1500 SOLUTION INTRAMUSCULAR; INTRAVENOUS at 09:32

## 2019-03-15 RX ADMIN — LABETALOL HYDROCHLORIDE 200 MG: 200 TABLET, FILM COATED ORAL at 15:55

## 2019-03-15 RX ADMIN — HYDROCODONE BITARTRATE AND ACETAMINOPHEN 1 TABLET: 5; 325 TABLET ORAL at 09:18

## 2019-03-15 RX ADMIN — HYDROCODONE BITARTRATE AND ACETAMINOPHEN 1 TABLET: 5; 325 TABLET ORAL at 15:55

## 2019-03-15 NOTE — ANESTHESIA POSTPROCEDURE EVALUATION
Patient: Tika Pugh    Procedure Summary     Date:  19 Room / Location:  [facility] LABOR DELIVERY 2 /  [facility]    Anesthesia Start:  1014 Anesthesia Stop:  113    Procedure:   SECTION PRIMARY (N/A Abdomen) Diagnosis:      Surgeon:  Ramses Pardo III, MD Provider:  Porsha Killian DO    Anesthesia Type:  spinal, ITN ASA Status:  3          Anesthesia Type: spinal, ITN  Last vitals  BP   123/59 (03/15/19 0850)   Temp   98 °F (36.7 °C) (03/15/19 0820)   Pulse   64 (03/15/19 0801)   Resp   18 (03/15/19 0820)     SpO2   97 % (19 1230)     Post Anesthesia Care and Evaluation    Patient location during evaluation: bedside  Patient participation: complete - patient participated  Level of consciousness: awake  Pain score: 6  Pain management: satisfactory to patient  Airway patency: patent  Anesthetic complications: No anesthetic complications  PONV Status: none  Cardiovascular status: acceptable and hemodynamically stable  Respiratory status: acceptable  Hydration status: acceptable  Post Neuraxial Block status: Motor and sensory function returned to baseline and No signs or symptoms of PDPH  Comments: Elevated troponin, LE edema.  Being evaluated by cardiology.

## 2019-03-15 NOTE — PLAN OF CARE
Problem: Breastfeeding (Adult,Obstetrics,Pediatric)  Intervention: Promote Breast Care and Comfort   03/15/19 1030   Breast Pumping   Breast Pumping double electric breast pump utilized     Intervention: Provide Support During Feeding Sessions   03/15/19 1030   Coping/Psychosocial Interventions   Parent/Child Attachment Promotion strengths emphasized;positive reinforcement provided     Intervention: Promote Positive Maternal Experience   03/15/19 1030   Coping/Psychosocial Interventions   Supportive Measures active listening utilized;decision-making supported   Promote Positive Maternal Experience   Breastfeeding Support maternal rest encouraged;encouragement provided     Intervention: Support Exclusive Breastfeeding Success   03/15/19 1030   Reproductive Interventions   Breastfeeding Assistance electric breast pump used;support offered       Goal: Signs and Symptoms of Listed Potential Problems Will be Absent, Minimized or Managed (Breastfeeding)  Outcome: Ongoing (interventions implemented as appropriate)   03/15/19 1030   Goal/Outcome Evaluation   Problems Assessed (Breastfeeding) situational response   Problems Present (Breastfeeding) situational response

## 2019-03-15 NOTE — PROGRESS NOTES
Postpartum Progress Note    Patient name: Tika Pugh  YOB: 1975   MRN: [id number]  Referring Provider: Arash Jeffries*  Admission Date: 3/13/2019  Date of Service: 3/15/2019    ID: 43 y.o.     Diagnosis:   S/p  delivery 1 Day Post-Op   Patient Active Problem List   Diagnosis   • Chronic nonintractable headache   • Gastroesophageal reflux disease   • Maternal tobacco use in first trimester   • Rh negative status during pregnancy   • Anemia due to vitamin B12 deficiency   • Uterine leiomyoma   • AMA (advanced maternal age) multigravida 35+   • Carpal tunnel syndrome of left wrist   • Nephrotic range proteinuria   • Single umbilical artery   • Poor fetal growth affecting management of mother in third trimester   • Morbid obesity with BMI of 45.0-49.9, adult (CMS/HCC)   • Pre-eclampsia in third trimester   • Gestational hypertension     CC:  Post op C/S  Subjective:      No complaints.  Moderate lochia. No flatus  Denies CP SOB  Ambulating, voiding, tolerating diet.  Pain well controlled.  The patient is currently breastfeeding.   This baby is in NICU     Objective:      Vital signs:  Vital Signs Range for the last 24 hours  Temperature: Temp:  [97.8 °F (36.6 °C)-98.5 °F (36.9 °C)] 98 °F (36.7 °C)   Temp Source: Temp src: Oral   BP: BP: ()/(50-75) 123/59   Pulse: Heart Rate:  [56-95] 64   Respirations: Resp:  [16-18] 18   Weight: (!) 141 kg (311 lb 3.2 oz)     General: Alert & oriented x4, in no apparent distress  Abdomen: soft, appropriately tender bowel sounds are soft  Uterus: firm, appropriately tender  Incision: clean, dry, intact, dressing clean, suture  Extremities: nontender; 2 +  edema  + scuds in place    Labs:  Lab Results   Component Value Date    WBC 16.20 (H) 03/15/2019    HGB 9.9 (L) 03/15/2019    HCT 31.0 (L) 03/15/2019    MCV 96.6 03/15/2019     (L) 03/15/2019     Results from last 7 days   Lab Units 19  1331   ABO TYPING  A   RH TYPING   Negative     External Prenatal Results     Pregnancy Outside Results - Transcribed From Office Records - See Scanned Records For Details     Test Value Date Time    Hgb 9.9 g/dL 03/15/19 0752    Hct 31.0 % 03/15/19 0752    ABO A  19 1331    Rh Negative  19 1331    Antibody Screen Positive  19 1716    Glucose Fasting GTT       Glucose Tolerance Test 1 hour       Glucose Tolerance Test 3 hour       Gonorrhea (discrete) Negative  18     Chlamydia (discrete) Negative  18     RPR Non Reactive  18 1539    VDRL       Syphilis Antibody       Rubella 12.50 index 18 1539    HBsAg Negative  18 1539    Herpes Simplex Virus PCR       Herpes Simplex VIrus Culture       HIV Non Reactive  18 1539    Hep C RNA Quant PCR       Hep C Antibody 0.1 s/co ratio 18 1539    AFP       Group B Strep       GBS Susceptibility to Clindamycin       GBS Susceptibility to Erythromycin       Fetal Fibronectin       Genetic Testing, Maternal Blood             Drug Screening     Test Value Date Time    Urine Drug Screen       Amphetamine Screen       Barbiturate Screen       Benzodiazepine Screen       Methadone Screen       Phencyclidine Screen       Opiates Screen       THC Screen       Cocaine Screen       Propoxyphene Screen       Buprenorphine Screen       Methamphetamine Screen       Oxycodone Screen       Tricyclic Antidepressants Screen                   Assessment/Plan:      1 Day Post-Op s/p Procedure(s):   SECTION PRIMARY  1. POD # 1 after a primary LTCS (2 layer closure): .  Doing well.  2.  Preeclampsia   pressure stable on lisinopril and labetalol  3.  Peripartum cardiomyopathy SCAD cardiology following  4.  Morbid obesity  5.  Thrombocytopenia improving  102 10*3  6.  Advanced maternal age  7.  Tobacco abuse  8.  MBT   Rh neg  9.  Blood loss anemia ( mild)  9.9/31.0 H/H    PLAN  1.  D/C magnesium  2.  D/C Warren  Continue strict I/O  3.  Increase ambulation  4.   Respiratory breathing treatments  5.  Start aspirin per cardiology  6.  CMP pending     . Questions were answered.

## 2019-03-15 NOTE — LACTATION NOTE
03/15/19 1030   Maternal Information   Date of Referral 03/15/19   Person Making Referral physician   Infant Reason for Referral NICU admission   Maternal Assessment   Breast Size Issue none   Equipment Type   Breast Pump Type double electric, hospital grade   Breast Pump Flange Type hard   Breast Pump Flange Size 24 mm   Reproductive Interventions   Breastfeeding Assistance electric breast pump used;support offered   Breastfeeding Support maternal rest encouraged;encouragement provided   Breast Pumping   Breast Pumping Interventions early pumping promoted;frequent pumping encouraged   Breast Pumping double electric breast pump utilized   Delay thia am in pump set-up and instruct secondary to maternal health- not feeling well and testing/ therapy. Patient set-up with Medela double electric pump. Instructed in pumping every 2-3hr, initiation phase. Instructed hand expression. Instructed pump part cleaning after each use and sterilization every 24hr.  Instructed breastmilk storage per NICU guidelines. VU

## 2019-03-15 NOTE — PROGRESS NOTES
[facility] at [facility]    Inpatient Progress Note      Chief Complaint/Reason for service:    · Volume overload, elevated troponin, abnormal echocardiogram         Subjective:       Denies chest pain or shortness of breath overnight.  Swelling is improved but still very significant.    Past medical, surgical, social and family history reviewed in the patient's electronic medical record.    Review of Systems:   Positive for lower extremity swelling  Negative for exertional chest pain, dyspnea with exertion, palpitations     Problem List  Active Hospital Problems    Diagnosis  POA   • Gestational hypertension [O13.9]  Yes      Resolved Hospital Problems   No resolved problems to display.            Objective:      Current Facility-Administered Medications:   •  acetaminophen (TYLENOL) tablet 650 mg, 650 mg, Oral, Q4H PRN, Ramses Pardo III, MD  •  acetaminophen (TYLENOL) tablet 650 mg, 650 mg, Oral, Q4H PRN, Monse Puri CRNA  •  albuterol (PROVENTIL) nebulizer solution 0.083% 2.5 mg/3mL, 2.5 mg, Nebulization, Q6H PRN, Dannie Becerra DO, 2.5 mg at 03/15/19 1051  •  butorphanol (STADOL) injection 1 mg, 1 mg, Intravenous, Q2H PRN, Eliseo Morillo MD, 1 mg at 03/14/19 0557  •  dextrose 5 % and sodium chloride 0.2 % infusion, 75 mL/hr, Intravenous, Continuous, Ramses Pardo III, MD, Stopped at 03/15/19 0920  •  diphenhydrAMINE (BENADRYL) capsule 25 mg, 25 mg, Oral, Q4H PRN **OR** diphenhydrAMINE (BENADRYL) injection 25 mg, 25 mg, Intravenous, Q4H PRN **OR** diphenhydrAMINE (BENADRYL) injection 25 mg, 25 mg, Intramuscular, Q4H PRN, Monse Puri CRNA  •  [START ON 3/16/2019] furosemide (LASIX) tablet 20 mg, 20 mg, Oral, Daily, Dannie Becerra DO  •  HYDROcodone-acetaminophen (NORCO) 5-325 MG per tablet 2 tablet, 2 tablet, Oral, Q6H PRN, Dannie Becerra DO, 1 tablet at 03/15/19 0918  •  Influenza Vac Subunit Quad (FLUCELVAX) injection 0.5 mL, 0.5 mL, Intramuscular, During  Hospitalization, Ramses Pardo III, MD  •  labetalol (NORMODYNE) tablet 200 mg, 200 mg, Oral, Q8H, Ramses Pardo III, MD, 200 mg at 03/15/19 0850  •  lactated ringers infusion, 125 mL/hr, Intravenous, Continuous, Ramses Pardo III, MD, Last Rate: 125 mL/hr at 03/14/19 1017  •  lisinopril (PRINIVIL,ZESTRIL) tablet 10 mg, 10 mg, Oral, Q24H, Ramses Pardo III, MD, 10 mg at 03/15/19 0850  •  [DISCONTINUED] ondansetron (ZOFRAN) tablet 4 mg, 4 mg, Oral, Q6H PRN **OR** ondansetron (ZOFRAN) injection 4 mg, 4 mg, Intravenous, Q6H PRN, Ramses Pardo III, MD, 4 mg at 03/15/19 0000  •  ondansetron (ZOFRAN) tablet 4 mg, 4 mg, Oral, Q8H PRN, Ramses Pardo III, MD  •  oxytocin in sodium chloride (PITOCIN) 30 UNIT/500ML infusion solution, 2 lacy-units/min, Intravenous, Titrated, Ramses Pardo III, MD, Stopped at 03/14/19 0922  •  oxytocin in sodium chloride (PITOCIN) 30 UNIT/500ML infusion solution, 650 mL/hr, Intravenous, Once **FOLLOWED BY** oxytocin in sodium chloride (PITOCIN) 30 UNIT/500ML infusion solution, 85 mL/hr, Intravenous, Once, Ramses Pardo III, MD  •  promethazine (PHENERGAN) injection 12.5 mg, 12.5 mg, Intravenous, Q4H PRN, High, Arsen YOUNG MD, 12.5 mg at 03/14/19 2102  •  sodium chloride 0.9 % flush 1-10 mL, 1-10 mL, Intravenous, PRN, Ramses Pardo III, MD  •  sodium chloride 0.9 % flush 3 mL, 3 mL, Intravenous, Q12H, Ramses Pardo III, MD, Stopped at 03/14/19 0802    Vital Sign Min/Max for last 24 hours  Temp  Min: 98 °F (36.7 °C)  Max: 98.5 °F (36.9 °C)   BP  Min: 96/54  Max: 150/72   Pulse  Min: 56  Max: 75   Resp  Min: 16  Max: 18   No Data Recorded   No Data Recorded      Intake/Output Summary (Last 24 hours) at 3/15/2019 1333  Last data filed at 3/15/2019 1007  Gross per 24 hour   Intake 150 ml   Output 1600 ml   Net -1450 ml           CONSTITUTIONAL: No acute distress, normal affect  RESPIRATORY: Normal effort. Clear to auscultation bilaterally without wheezing or  rales  CARDIOVASCULAR: Regular rate and rhythm with normal S1 and S2. Without murmur, gallop or rub.  PERIPHERAL VASCULAR: Normal radial pulses bilaterally. There is peripheral edema bilaterally.    Results Review:   Lab Results   Component Value Date    TROPONINI 0.054 (H) 03/15/2019       BUN   Date Value Ref Range Status   03/15/2019 13 9 - 23 mg/dL Final     Creatinine   Date Value Ref Range Status   03/15/2019 0.93 0.60 - 1.30 mg/dL Final   03/15/2019 0.95 0.60 - 1.30 mg/dL Final     Potassium   Date Value Ref Range Status   03/15/2019 4.5 3.5 - 5.5 mmol/L Final     ALT (SGPT)   Date Value Ref Range Status   03/15/2019 12 7 - 40 U/L Final   03/15/2019 12 7 - 40 U/L Final     AST (SGOT)   Date Value Ref Range Status   03/15/2019 20 0 - 33 U/L Final   03/15/2019 20 0 - 33 U/L Final       No results found for: CHOL  No results found for: TRIG  No results found for: HDL  No results found for: LDLC  No results found for: LDL  No components found for: LDLDIRECTC        Tele:  NSR    EKG:  Normal Sinus Rhythm, Anterior infarct , age undetermined      TTE 3/2019  · Estimated EF = 45-50%  · LV apical hypokinesia.  · Left ventricular wall thickness is consistent with mild concentric hypertrophy.  · Mild mitral valve regurgitation is present              Assessment/Plan:     ASSESSMENT:  -Chest pressure, dyspnea, volume overload during pregnancy. Acute systolic HF symptoms. Improving with diuresis.  Echocardiogram with EF 45- 50% with LV apical hypokinesia and mild MR. EKG with PRWP. Troponin 0.059 this AM. Risk factors for CAD include tobacco abuse and familial history. Findings concerning for peripartum cardiomyopathy vs spontaneous coronary artery dissection (SCAD) vs traditional ACS/NSTEMI. The clinical presentation and nature of the wall motion abnormality on echo seems most consistent with SCAD at this time.   -35 weeks pregnant, now s/p C section  -Obesity  -Hypertension     PLAN:  -HF: continue beta blocker,  lisinopril. Decreased dose of Lasix to 20mg IVP daily, but will likely benefit from longer term therapy  -Elevated troponin, wall motion abnormality on TTE concerning for ACS: Recommend ischemic evaluation with a left heart cath next Tuesday, NPO p midnight on Monday.  Continue beta blocker.  Depending on heart catheterization results, will decide on aspirin and/or Plavix.  We'll need to consider whether not the patient intends to breast-feed    -Discussed with Dr Becerra  -Will see on an as needed basis over the weekend. Please feel free to call with any questions or concerns.    Eb Spence MD, MSc, [facility]  Interventional Cardiology  [facility] at [facility]  3/15/2019

## 2019-03-16 PROCEDURE — 94799 UNLISTED PULMONARY SVC/PX: CPT

## 2019-03-16 PROCEDURE — 99232 SBSQ HOSP IP/OBS MODERATE 35: CPT | Performed by: OBSTETRICS & GYNECOLOGY

## 2019-03-16 RX ORDER — BISACODYL 5 MG/1
10 TABLET, DELAYED RELEASE ORAL DAILY PRN
Status: DISCONTINUED | OUTPATIENT
Start: 2019-03-16 | End: 2019-03-19 | Stop reason: HOSPADM

## 2019-03-16 RX ORDER — DOCUSATE SODIUM 100 MG/1
100 CAPSULE, LIQUID FILLED ORAL 2 TIMES DAILY PRN
Status: DISCONTINUED | OUTPATIENT
Start: 2019-03-16 | End: 2019-03-19 | Stop reason: HOSPADM

## 2019-03-16 RX ADMIN — LISINOPRIL 10 MG: 10 TABLET ORAL at 08:21

## 2019-03-16 RX ADMIN — LABETALOL HYDROCHLORIDE 200 MG: 200 TABLET, FILM COATED ORAL at 00:08

## 2019-03-16 RX ADMIN — HYDROCODONE BITARTRATE AND ACETAMINOPHEN 1 TABLET: 5; 325 TABLET ORAL at 13:54

## 2019-03-16 RX ADMIN — HYDROCODONE BITARTRATE AND ACETAMINOPHEN 1 TABLET: 5; 325 TABLET ORAL at 03:34

## 2019-03-16 RX ADMIN — HYDROCODONE BITARTRATE AND ACETAMINOPHEN 1 TABLET: 5; 325 TABLET ORAL at 21:54

## 2019-03-16 RX ADMIN — SODIUM CHLORIDE, PRESERVATIVE FREE 3 ML: 5 INJECTION INTRAVENOUS at 08:21

## 2019-03-16 RX ADMIN — LABETALOL HYDROCHLORIDE 200 MG: 200 TABLET, FILM COATED ORAL at 18:03

## 2019-03-16 RX ADMIN — LABETALOL HYDROCHLORIDE 200 MG: 200 TABLET, FILM COATED ORAL at 23:54

## 2019-03-16 RX ADMIN — LABETALOL HYDROCHLORIDE 200 MG: 200 TABLET, FILM COATED ORAL at 08:21

## 2019-03-16 RX ADMIN — FUROSEMIDE 20 MG: 20 TABLET ORAL at 08:21

## 2019-03-17 PROCEDURE — 25010000002 HEPARIN (PORCINE) PER 1000 UNITS: Performed by: OBSTETRICS & GYNECOLOGY

## 2019-03-17 PROCEDURE — 94799 UNLISTED PULMONARY SVC/PX: CPT

## 2019-03-17 PROCEDURE — 99232 SBSQ HOSP IP/OBS MODERATE 35: CPT | Performed by: OBSTETRICS & GYNECOLOGY

## 2019-03-17 RX ORDER — HEPARIN SODIUM 5000 [USP'U]/ML
5000 INJECTION, SOLUTION INTRAVENOUS; SUBCUTANEOUS EVERY 8 HOURS SCHEDULED
Status: DISCONTINUED | OUTPATIENT
Start: 2019-03-17 | End: 2019-03-19 | Stop reason: HOSPADM

## 2019-03-17 RX ORDER — FUROSEMIDE 20 MG/1
20 TABLET ORAL
Status: DISCONTINUED | OUTPATIENT
Start: 2019-03-17 | End: 2019-03-18

## 2019-03-17 RX ORDER — POTASSIUM CHLORIDE 750 MG/1
20 CAPSULE, EXTENDED RELEASE ORAL DAILY
Status: DISCONTINUED | OUTPATIENT
Start: 2019-03-17 | End: 2019-03-19 | Stop reason: HOSPADM

## 2019-03-17 RX ADMIN — HEPARIN SODIUM 5000 UNITS: 5000 INJECTION INTRAVENOUS; SUBCUTANEOUS at 21:57

## 2019-03-17 RX ADMIN — FUROSEMIDE 20 MG: 20 TABLET ORAL at 08:54

## 2019-03-17 RX ADMIN — LISINOPRIL 10 MG: 10 TABLET ORAL at 08:53

## 2019-03-17 RX ADMIN — ALBUTEROL SULFATE 2.5 MG: 2.5 SOLUTION RESPIRATORY (INHALATION) at 18:29

## 2019-03-17 RX ADMIN — FUROSEMIDE 20 MG: 20 TABLET ORAL at 18:22

## 2019-03-17 RX ADMIN — LABETALOL HYDROCHLORIDE 200 MG: 200 TABLET, FILM COATED ORAL at 08:53

## 2019-03-17 RX ADMIN — LABETALOL HYDROCHLORIDE 200 MG: 200 TABLET, FILM COATED ORAL at 16:11

## 2019-03-17 RX ADMIN — HYDROCODONE BITARTRATE AND ACETAMINOPHEN 1 TABLET: 5; 325 TABLET ORAL at 09:21

## 2019-03-17 RX ADMIN — HEPARIN SODIUM 5000 UNITS: 5000 INJECTION INTRAVENOUS; SUBCUTANEOUS at 15:59

## 2019-03-17 RX ADMIN — POTASSIUM CHLORIDE 20 MEQ: 750 CAPSULE, EXTENDED RELEASE ORAL at 16:00

## 2019-03-17 RX ADMIN — LABETALOL HYDROCHLORIDE 200 MG: 200 TABLET, FILM COATED ORAL at 23:44

## 2019-03-17 NOTE — LACTATION NOTE
Patient indicates she is continuing to try to pump her breasts every 3 hours for her NICU infant, but is still not getting any flow.  She was encouraged to continue to pump at least 8 times per day.  She was also encouraged to get her home pump ordered.

## 2019-03-17 NOTE — PROGRESS NOTES
Postpartum Progress Note    Patient name: Tika Pugh  YOB: 1975   MRN: [id number]  Referring Provider: Arash Jeffries*  Admission Date: 3/13/2019  Date of Service: 3/17/2019    ID: 43 y.o.     Diagnosis:   S/p  delivery 3 Days Post-Op   Patient Active Problem List   Diagnosis   • Chronic nonintractable headache   • Gastroesophageal reflux disease   • Maternal tobacco use in first trimester   • Rh negative status during pregnancy   • Anemia due to vitamin B12 deficiency   • Uterine leiomyoma   • AMA (advanced maternal age) multigravida 35+   • Carpal tunnel syndrome of left wrist   • Nephrotic range proteinuria   • Single umbilical artery   • Poor fetal growth affecting management of mother in third trimester   • Morbid obesity with BMI of 45.0-49.9, adult (CMS/HCC)   • Pre-eclampsia in third trimester   • Gestational hypertension       Subjective:      No complaints.  Light lochia.  Ambulating, voiding, tolerating diet.  Pain well controlled.  The patient is currently breastfeeding.   This baby is in NICU.    Objective:      Vital signs:  Vital Signs Range for the last 24 hours  Temperature: Temp:  [98.1 °F (36.7 °C)-99 °F (37.2 °C)] 98.4 °F (36.9 °C)   Temp Source: Temp src: Oral   BP: BP: (109-135)/(53-60) 135/60   Pulse: Heart Rate:  [67-76] 72   Respirations: Resp:  [16-18] 18   Weight: (!) 137 kg (302 lb 6.4 oz)     General: Alert & oriented x4, in no apparent distress  Neg JVD  Abdomen: soft, appropriately tender bowel sounds present  Uterus: firm, appropriately tender  Incision: clean, dry, intact, dressing clean, suture  Extremities: nontender; 2 +  edema  SCUDS present    Labs:  Lab Results   Component Value Date    WBC 16.20 (H) 03/15/2019    HGB 9.9 (L) 03/15/2019    HCT 31.0 (L) 03/15/2019    MCV 96.6 03/15/2019     (L) 03/15/2019     Results from last 7 days   Lab Units 19  1331   ABO TYPING  A   RH TYPING  Negative     External Prenatal Results      Pregnancy Outside Results - Transcribed From Office Records - See Scanned Records For Details     Test Value Date Time    Hgb 9.9 g/dL 03/15/19 0752    Hct 31.0 % 03/15/19 0752    ABO A  19 1331    Rh Negative  19 1331    Antibody Screen Positive  19 1716    Glucose Fasting GTT       Glucose Tolerance Test 1 hour       Glucose Tolerance Test 3 hour       Gonorrhea (discrete) Negative  18     Chlamydia (discrete) Negative  18     RPR Non Reactive  18 1539    VDRL       Syphilis Antibody       Rubella 12.50 index 18 1539    HBsAg Negative  18 1539    Herpes Simplex Virus PCR       Herpes Simplex VIrus Culture       HIV Non Reactive  18 1539    Hep C RNA Quant PCR       Hep C Antibody 0.1 s/co ratio 18 1539    AFP       Group B Strep Negative  19 1640    GBS Susceptibility to Clindamycin       GBS Susceptibility to Erythromycin       Fetal Fibronectin       Genetic Testing, Maternal Blood             Drug Screening     Test Value Date Time    Urine Drug Screen       Amphetamine Screen       Barbiturate Screen       Benzodiazepine Screen       Methadone Screen       Phencyclidine Screen       Opiates Screen       THC Screen       Cocaine Screen       Propoxyphene Screen       Buprenorphine Screen       Methamphetamine Screen       Oxycodone Screen       Tricyclic Antidepressants Screen                   Assessment/Plan:      3 Days Post-Op s/p Procedure(s):   SECTION PRIMARY  1. POD # 3 after a primary LTCS (2 layer closure) doing well.  2.  Preeclampsia-blood pressure stable on lisinopril and labetalol  3.  Cardiomyopathy stable on lisinopril, beta-blocker, daily diuretics daily wt stable  4.  Morbid obesity  5.  Advanced maternal age  6.  Thrombocytopenia-stable  7.  Tobacco abuse  8.   Maternal blood type Rh-, patient received RhoGam 3-15-19  9.   Blood loss anemia    PLAN  1.  Discontinue KIKO drain  2.  DVT prophylaxis SCUDS, SQ heparin,  and Encourage ambulation  3.  Reevaluation in a.m. by cardiology (probable heart catheter later this week)  4.  Repeat labs in a.m.  5.   Increase lasix  To bid Start on daily potassium supplement  6.  Continue with incentive spirometry and as needed neb treatment  7.  D/W DR Spence (interventional cardiologist)    . Questions were answered.

## 2019-03-18 LAB
ABO GROUP BLD: NORMAL
ALBUMIN SERPL-MCNC: 2.53 G/DL (ref 3.2–4.8)
ALBUMIN/GLOB SERPL: 1.3 G/DL (ref 1.5–2.5)
ALP SERPL-CCNC: 107 U/L (ref 25–100)
ALT SERPL W P-5'-P-CCNC: 9 U/L (ref 7–40)
ANION GAP SERPL CALCULATED.3IONS-SCNC: 4 MMOL/L (ref 3–11)
ANTI-D, PASSIVE: NORMAL
AST SERPL-CCNC: 12 U/L (ref 0–33)
BILIRUB SERPL-MCNC: 0.2 MG/DL (ref 0.3–1.2)
BLD GP AB SCN SERPL QL: POSITIVE
BUN BLD-MCNC: 13 MG/DL (ref 9–23)
BUN/CREAT SERPL: 21.7 (ref 7–25)
CALCIUM SPEC-SCNC: 7.8 MG/DL (ref 8.7–10.4)
CHLORIDE SERPL-SCNC: 110 MMOL/L (ref 99–109)
CO2 SERPL-SCNC: 23 MMOL/L (ref 20–31)
CREAT BLD-MCNC: 0.6 MG/DL (ref 0.6–1.3)
CYTO UR: NORMAL
GFR SERPL CREATININE-BSD FRML MDRD: 110 ML/MIN/1.73
GLOBULIN UR ELPH-MCNC: 2 GM/DL
GLUCOSE BLD-MCNC: 79 MG/DL (ref 70–100)
LAB AP CASE REPORT: NORMAL
LAB AP CLINICAL INFORMATION: NORMAL
PATH REPORT.FINAL DX SPEC: NORMAL
PATH REPORT.GROSS SPEC: NORMAL
POTASSIUM BLD-SCNC: 4.3 MMOL/L (ref 3.5–5.5)
PROT SERPL-MCNC: 4.5 G/DL (ref 5.7–8.2)
RH BLD: NEGATIVE
SODIUM BLD-SCNC: 137 MMOL/L (ref 132–146)
T&S EXPIRATION DATE: NORMAL
TROPONIN I SERPL-MCNC: 0.07 NG/ML

## 2019-03-18 PROCEDURE — 86850 RBC ANTIBODY SCREEN: CPT | Performed by: OBSTETRICS & GYNECOLOGY

## 2019-03-18 PROCEDURE — 86900 BLOOD TYPING SEROLOGIC ABO: CPT | Performed by: OBSTETRICS & GYNECOLOGY

## 2019-03-18 PROCEDURE — 25010000002 HEPARIN (PORCINE) PER 1000 UNITS: Performed by: OBSTETRICS & GYNECOLOGY

## 2019-03-18 PROCEDURE — 86870 RBC ANTIBODY IDENTIFICATION: CPT | Performed by: OBSTETRICS & GYNECOLOGY

## 2019-03-18 PROCEDURE — 25010000002 FUROSEMIDE PER 20 MG: Performed by: INTERNAL MEDICINE

## 2019-03-18 PROCEDURE — 80053 COMPREHEN METABOLIC PANEL: CPT | Performed by: OBSTETRICS & GYNECOLOGY

## 2019-03-18 PROCEDURE — 84484 ASSAY OF TROPONIN QUANT: CPT | Performed by: OBSTETRICS & GYNECOLOGY

## 2019-03-18 PROCEDURE — 86901 BLOOD TYPING SEROLOGIC RH(D): CPT | Performed by: OBSTETRICS & GYNECOLOGY

## 2019-03-18 PROCEDURE — 99232 SBSQ HOSP IP/OBS MODERATE 35: CPT | Performed by: INTERNAL MEDICINE

## 2019-03-18 PROCEDURE — 97162 PT EVAL MOD COMPLEX 30 MIN: CPT

## 2019-03-18 RX ORDER — FUROSEMIDE 40 MG/1
40 TABLET ORAL DAILY
Status: DISCONTINUED | OUTPATIENT
Start: 2019-03-18 | End: 2019-03-18

## 2019-03-18 RX ORDER — FUROSEMIDE 10 MG/ML
40 INJECTION INTRAMUSCULAR; INTRAVENOUS DAILY
Status: DISCONTINUED | OUTPATIENT
Start: 2019-03-18 | End: 2019-03-18

## 2019-03-18 RX ORDER — FUROSEMIDE 10 MG/ML
40 INJECTION INTRAMUSCULAR; INTRAVENOUS DAILY
Status: DISCONTINUED | OUTPATIENT
Start: 2019-03-19 | End: 2019-03-19 | Stop reason: HOSPADM

## 2019-03-18 RX ORDER — FUROSEMIDE 10 MG/ML
20 INJECTION INTRAMUSCULAR; INTRAVENOUS ONCE
Status: COMPLETED | OUTPATIENT
Start: 2019-03-18 | End: 2019-03-18

## 2019-03-18 RX ADMIN — POTASSIUM CHLORIDE 20 MEQ: 750 CAPSULE, EXTENDED RELEASE ORAL at 08:47

## 2019-03-18 RX ADMIN — HEPARIN SODIUM 5000 UNITS: 5000 INJECTION INTRAVENOUS; SUBCUTANEOUS at 05:37

## 2019-03-18 RX ADMIN — HEPARIN SODIUM 5000 UNITS: 5000 INJECTION INTRAVENOUS; SUBCUTANEOUS at 14:35

## 2019-03-18 RX ADMIN — FUROSEMIDE 20 MG: 10 INJECTION, SOLUTION INTRAMUSCULAR; INTRAVENOUS at 14:35

## 2019-03-18 RX ADMIN — FUROSEMIDE 40 MG: 20 TABLET ORAL at 08:47

## 2019-03-18 RX ADMIN — LABETALOL HYDROCHLORIDE 200 MG: 200 TABLET, FILM COATED ORAL at 18:18

## 2019-03-18 RX ADMIN — HEPARIN SODIUM 5000 UNITS: 5000 INJECTION INTRAVENOUS; SUBCUTANEOUS at 21:10

## 2019-03-18 RX ADMIN — LABETALOL HYDROCHLORIDE 200 MG: 200 TABLET, FILM COATED ORAL at 08:47

## 2019-03-18 RX ADMIN — GUAIFENESIN 200 MG: 200 SOLUTION ORAL at 00:29

## 2019-03-18 NOTE — PLAN OF CARE
Problem: Patient Care Overview  Goal: Plan of Care Review  Outcome: Ongoing (interventions implemented as appropriate)   03/18/19 1411   Coping/Psychosocial   Plan of Care Reviewed With patient   OTHER   Outcome Summary PT eval completed. Pt presents with BLE swelling impacting mobility independence. Pt req Peter for supine <> sit, and ambulated 100ft with RW with CGA. Pt given adaptive equipment of sock aid, leg , and long handled sponge and educated in use. Pt will benefit from IPPT services to address mobility. PT recommends bariatric rolling walker to use at home at d/c.

## 2019-03-18 NOTE — PROGRESS NOTES
Postpartum Progress Note    Patient name: Tika Pugh  YOB: 1975   MRN: [id number]  Referring Provider: Arash Jeffries   Admission Date: 3/13/2019  Date of Service: 3/18/2019    ID: 43 y.o.     Diagnosis:   S/p  delivery 4 Days Post-Op   Patient Active Problem List   Diagnosis   • Chronic nonintractable headache   • Gastroesophageal reflux disease   • Maternal tobacco use in first trimester   • Rh negative status during pregnancy   • Anemia due to vitamin B12 deficiency   • Uterine leiomyoma   • AMA (advanced maternal age) multigravida 35+   • Carpal tunnel syndrome of left wrist   • Nephrotic range proteinuria   • Single umbilical artery   • Poor fetal growth affecting management of mother in third trimester   • Morbid obesity with BMI of 45.0-49.9, adult (CMS/[facility])   • Pre-eclampsia in third trimester   • Gestational hypertension       Subjective:      No complaints.  Moderate lochia.  Ambulating, voiding, tolerating diet.  Pain well controlled. Denies chest pain  The patient is currently breastfeeding.       Objective:      Vital signs:  Vital Signs Range for the last 24 hours  Temperature: Temp:  [97.7 °F (36.5 °C)-98.5 °F (36.9 °C)] 98.5 °F (36.9 °C)   Temp Source: Temp src: Oral   BP: BP: (117-143)/(53-70) 126/58   Pulse: Heart Rate:  [62-75] 67   Respirations: Resp:  [16-20] 18   Weight: (!) 138 kg (305 lb 5.4 oz)     General: Alert & oriented x4, in no apparent distress  Chest: faint end inspiratory wheezes, normal air movement  Heart: RRR without murmurs, gallops or rubs.  Abdomen: soft, nontender  Uterus: firm, nontender  Incision: clean, dry, intact, dressing clean, suture in place.  Extremities: nontender; continued 4+ lower extremity edema    Labs:  Lab Results   Component Value Date    WBC 16.20 (H) 03/15/2019    HGB 9.9 (L) 03/15/2019    HCT 31.0 (L) 03/15/2019    MCV 96.6 03/15/2019     (L) 03/15/2019     Results from last 7 days   Lab Units  19  0633   ABO TYPING  A   RH TYPING  Negative     External Prenatal Results     Pregnancy Outside Results - Transcribed From Office Records - See Scanned Records For Details     Test Value Date Time    Hgb 9.9 g/dL 03/15/19 0752    Hct 31.0 % 03/15/19 0752    ABO A  19 0633    Rh Negative  19 0633    Antibody Screen Positive  19 0633    Glucose Fasting GTT       Glucose Tolerance Test 1 hour       Glucose Tolerance Test 3 hour       Gonorrhea (discrete) Negative  18     Chlamydia (discrete) Negative  18     RPR Non Reactive  18 1539    VDRL       Syphilis Antibody       Rubella 12.50 index 18 1539    HBsAg Negative  18 1539    Herpes Simplex Virus PCR       Herpes Simplex VIrus Culture       HIV Non Reactive  18 1539    Hep C RNA Quant PCR       Hep C Antibody 0.1 s/co ratio 18 1539    AFP       Group B Strep Negative  19 1640    GBS Susceptibility to Clindamycin       GBS Susceptibility to Erythromycin       Fetal Fibronectin       Genetic Testing, Maternal Blood             Drug Screening     Test Value Date Time    Urine Drug Screen       Amphetamine Screen       Barbiturate Screen       Benzodiazepine Screen       Methadone Screen       Phencyclidine Screen       Opiates Screen       THC Screen       Cocaine Screen       Propoxyphene Screen       Buprenorphine Screen       Methamphetamine Screen       Oxycodone Screen       Tricyclic Antidepressants Screen                   Assessment/Plan:      4 Days Post-Op s/p Procedure(s):   SECTION PRIMARY  1. S/p  delivery: Continue postoperative care.  Doing well.  2. Schedule to undergo cardiac catheterization tomorrow.  I spent 15 minutes with this patient of which greater than 50% was face to face counseling and coordination of care. Questions were answered.

## 2019-03-18 NOTE — NURSING NOTE
Patient Name:  Tika Pugh  :  1975  DOS:  19    Active Hospital Problems    Diagnosis   • Gestational hypertension       Consult has been received.  Medical records have been reviewed.  Patient with peripartum cardiomyopathy s/p CS 3/14/19.  Patient is a good candidate for the [facility].  Education provided.  Education time 10 min.   Patient to be scheduled follow up 3-5 days post discharge.    Echo Results:  · Estimated EF = 45-50%  · LV apical hypokinesia.  · Left ventricular wall thickness is consistent with mild concentric hypertrophy.  · Mild mitral valve regurgitation is present    NYHA Class:III      Heart Failure Education    Medications management and adherance, Low Na diet, Signs / symptoms, Daily weight monitoring, Role of [facility] and when to call and EF teaching

## 2019-03-18 NOTE — PAYOR COMM NOTE
"Rex Farias (43 y.o. Female)     Auth#[id number]    Continued Stay Clinical    From: Yas Villalta  #[phone]  Fax#[phone]      Date of Birth Social Security Number Address Home Phone MRN    1975  [address] [place] [address] [phone] [id number]    Taoist Marital Status          Quaker Single       Admission Date Admission Type Admitting Provider Attending Provider Department, Room/Bed    3/13/19 Elective Ramses Pardo III, MD Allen, George Sea III, MD [facility] ANTEPARTUM, N334/1    Discharge Date Discharge Disposition Discharge Destination                       Attending Provider:  Ramses Pardo III, MD    Allergies:  No Known Allergies    Isolation:  None   Infection:  None   Code Status:  CPR    Ht:  170.2 cm (67\")   Wt:  138 kg (305 lb 5.4 oz)    Admission Cmt:  None   Principal Problem:  None                Active Insurance as of 3/13/2019     Primary Coverage     Payor Plan Insurance Group Employer/Plan Group    [employer] MEDICAID [employer] MEDICAID [id number]     Payor Plan Address Payor Plan Phone Number Payor Plan Fax Number Effective Dates    [address] [address] [phone]  1/1/2019 - None Entered    [facility] [place] [address]       Subscriber Name Subscriber Birth Date Member ID       REX FARIAS 1975 [id number]                 Emergency Contacts      (Rel.) Home Phone Work Phone Mobile Phone    Cortney Knight (Mother) [phone] -- [phone]    NELSY GARCIA (Significant Other) [phone] -- --            ICU Vital Signs     Row Name 03/18/19 0851 03/18/19 0834 03/18/19 0545 03/18/19 0410 03/17/19 2344       Height and Weight    Weight  --  --  138 kg (305 lb 5.4 oz)  (Abnormal)   --  --    Weight Method  --  --  Bed scale  --  --       Vitals    Temp  --  98.5 °F (36.9 °C)  --  98.3 °F (36.8 °C)  98.2 °F (36.8 °C)    Temp src  --  Oral  --  Oral  Oral    Pulse  67  62  --  71  72    Heart Rate Source  --  Monitor "  --  Monitor  --    Resp  --  18  --  18  18    Resp Rate Source  --  Visual  --  Visual  Visual    BP  126/58  119/53  --  117/58  126/59    BP Location  --  Right arm  --  Right arm  --    BP Method  --  Automatic  --  Automatic  --    Patient Position  --  Sitting  --  Sitting  --    Row Name 03/17/19 1952 03/17/19 1949 03/17/19 1826 03/17/19 1608 03/17/19 1500       Vitals    Temp  97.9 °F (36.6 °C)  --  --  97.7 °F (36.5 °C)  --    Temp src  Oral  --  --  Axillary  --    Pulse  75  --  --  71  --    Heart Rate Source  Monitor  --  --  Monitor  --    Resp  18  --  16  20  --    Resp Rate Source  Visual  --  Visual  Visual  --    BP  128/60  --  --  143/70  --    BP Location  Right arm  --  --  --  --    BP Method  Automatic  --  --  --  --    Patient Position  Standing  --  --  --  --       Oxygen Therapy    SpO2  --  --  --  100 %  --    Pulse Oximetry Type  --  --  --  Intermittent  --    Device (Oxygen Therapy)  --  --  room air  room air  --       Patient Observation    Observations  --  Pt to NICU by wheelchair with signifigant other to see baby  Pt. requesting to have a breathing treatment, RT notified.   --  Pt. remains in NICU.    Row Name 03/17/19 1430 03/17/19 1345 03/17/19 1134 03/17/19 0900 03/17/19 0846       Vitals    Temp  --  --  98.1 °F (36.7 °C)  --  98.4 °F (36.9 °C)    Temp src  --  --  Oral  --  Oral    Pulse  --  --  75  --  72    Heart Rate Source  --  --  Monitor  --  Monitor    Resp  --  --  18  --  18    Resp Rate Source  --  --  Visual  --  Stethoscope    BP  --  --  119/56  --  135/60    BP Location  --  --  Left arm  --  Right arm    BP Method  --  --  Automatic  --  Automatic    Patient Position  --  --  Sitting  --  Lying       Oxygen Therapy    Device (Oxygen Therapy)  --  --  --  room air  --       Patient Observation    Observations  Pt. remains in NICU.  Pt. to NICU via wheelchair with  to see baby.  --  --  --    Row Name 03/17/19 0653 03/17/19 0412 03/17/19 0037  03/16/19 2337 03/16/19 2230       Height and Weight    Weight  137 kg (302 lb 6.4 oz)  (Abnormal)   --  --  --  --    Weight Method  Standing scale  --  --  --  --       Vitals    Temp  --  98.2 °F (36.8 °C)  99 °F (37.2 °C)  --  --    Temp src  --  Oral  Oral  --  --    Pulse  --  76  69  --  --    Heart Rate Source  --  Monitor  Monitor  --  --    Resp  --  18  18  --  --    Resp Rate Source  --  Visual  Visual  --  --    BP  --  133/60  109/55  --  --    BP Location  --  Right arm  Right arm  --  --    BP Method  --  Automatic  Automatic  --  --    Patient Position  --  Lying  Sitting  --  --       Patient Observation    Observations  --  --  --  Returned to room    pt in bed, interdry over incision, denies needs at this time    Row Name 03/16/19 2203 03/16/19 2202 03/16/19 2010 03/16/19 1953 03/16/19 1607       Vitals    Temp  --  --  --  98.4 °F (36.9 °C)  98.1 °F (36.7 °C)    Temp src  --  --  --  Oral  Oral    Pulse  --  --  --  70  67    Heart Rate Source  --  --  --  Monitor  Monitor    Resp  --  --  --  18  16    Resp Rate Source  --  --  --  Visual  Visual    BP  --  --  --  110/53  125/59    BP Location  --  --  --  Right arm  Right arm    BP Method  --  --  --  Automatic  Automatic    Patient Position  --  --  --  Sitting  Lying       Patient Observation    Observations  pt out of shower requesting pain medication      pt up to the NICU with FOB   --  --    Row Name 03/16/19 1230 03/16/19 0823 03/16/19 0817 03/16/19 0412 03/15/19 2315       Height and Weight    Weight  --  137 kg (302 lb)  (Abnormal)   --  --  --       Vitals    Temp  98.1 °F (36.7 °C)  --  98.5 °F (36.9 °C)  98.3 °F (36.8 °C)  98.5 °F (36.9 °C)    Temp src  Oral  --  Oral  Oral  Oral    Pulse  72  --  68  65  63    Heart Rate Source  Monitor  --  Monitor  Monitor  Monitor    Resp  16  --  16  18  18    Resp Rate Source  Visual  --  Visual  Visual  Visual    BP  127/56  --  132/62  125/58  128/60    BP Location  Right arm  --  Right arm   Right arm  Left arm    BP Method  Automatic  --  Automatic  Automatic  Automatic    Patient Position  Sitting  --  Sitting  Sitting  Lying       Oxygen Therapy    SpO2  --  --  --  --  100 %    Pulse Oximetry Type  --  --  --  --  Intermittent    Device (Oxygen Therapy)  --  --  --  --  room air    Row Name 03/15/19 2120 03/15/19 2100 03/15/19 1955 03/15/19 1952 03/15/19 1812       Vitals    Temp  --  --  --  98.2 °F (36.8 °C)  --    Temp src  --  --  --  Oral  --    Pulse  --  --  --  61  66    Heart Rate Source  --  --  --  Monitor  --    Resp  --  --  --  18  --    Resp Rate Source  --  --  --  Visual  --    BP  --  --  --  99/54  118/56    BP Location  --  --  --  Right arm  --    BP Method  --  --  --  Automatic  --    Patient Position  --  --  --  Lying  --       Oxygen Therapy    Device (Oxygen Therapy)  room air  --  --  --  --       Patient Observation    Observations  --  Pt returned from NICU  Pt to NICU via wheelchair accompanied by significant other  --  --    Row Name 03/15/19 1616 03/15/19 1555 03/15/19 1553 03/15/19 1300 03/15/19 1227       Vitals    Temp  --  --  98.2 °F (36.8 °C)  --  98.2 °F (36.8 °C)    Temp src  --  --  --  --  Oral    Pulse  64  --  64  --  62    Heart Rate Source  --  --  --  --  Monitor    Resp  16  --  18  --  16    Resp Rate Source  --  --  --  --  Visual    BP  --  121/60  121/60  --  105/55    BP Location  --  --  --  --  Right arm    BP Method  --  --  --  --  Automatic    Patient Position  --  --  --  --  Sitting       Oxygen Therapy    SpO2  --  --  98 %  99 %  --    Row Name 03/15/19 1051 03/15/19 1000                Vitals    Pulse  66  --       Resp  16  18           Hospital Medications (active)       Dose Frequency Start End    acetaminophen (TYLENOL) tablet 650 mg 650 mg Every 4 Hours PRN 3/14/2019     Sig - Route: Take 2 tablets by mouth Every 4 (Four) Hours As Needed for Mild Pain . - Oral    albuterol (PROVENTIL) nebulizer solution 0.083% 2.5 mg/3mL 2.5 mg  "Every 6 Hours PRN 3/15/2019     Sig - Route: Take 2.5 mg by nebulization Every 6 (Six) Hours As Needed for Shortness of Air. - Nebulization    bisacodyl (DULCOLAX) EC tablet 10 mg 10 mg Daily PRN 3/16/2019     Sig - Route: Take 2 tablets by mouth Daily As Needed for Constipation. - Oral    butorphanol (STADOL) injection 1 mg 1 mg Every 2 Hours PRN 3/14/2019     Sig - Route: Infuse 1 mL into a venous catheter Every 2 (Two) Hours As Needed for Severe Pain . - Intravenous    diphenhydrAMINE (BENADRYL) capsule 25 mg 25 mg Every 4 Hours PRN 3/14/2019     Sig - Route: Take 1 capsule by mouth Every 4 (Four) Hours As Needed for Itching. - Oral    Linked Group 1:  \"Or\" Linked Group Details        diphenhydrAMINE (BENADRYL) injection 25 mg 25 mg Every 4 Hours PRN 3/14/2019     Sig - Route: Infuse 0.5 mL into a venous catheter Every 4 (Four) Hours As Needed for Itching. - Intravenous    Linked Group 1:  \"Or\" Linked Group Details        diphenhydrAMINE (BENADRYL) injection 25 mg 25 mg Every 4 Hours PRN 3/14/2019     Sig - Route: Inject 0.5 mL into the appropriate muscle as directed by prescriber Every 4 (Four) Hours As Needed for Itching. - Intramuscular    Linked Group 1:  \"Or\" Linked Group Details        docusate sodium (COLACE) capsule 100 mg 100 mg 2 Times Daily PRN 3/16/2019     Sig - Route: Take 1 capsule by mouth 2 (Two) Times a Day As Needed for Constipation. - Oral    furosemide (LASIX) injection 20 mg 20 mg Once 3/18/2019     Sig - Route: Infuse 2 mL into a venous catheter 1 (One) Time. - Intravenous    furosemide (LASIX) injection 40 mg 40 mg Daily 3/19/2019     Sig - Route: Infuse 4 mL into a venous catheter Daily. - Intravenous    guaiFENesin (ROBITUSSIN) 100 MG/5ML oral solution 200 mg 200 mg Every 4 Hours PRN 3/18/2019     Sig - Route: Take 10 mL by mouth Every 4 (Four) Hours As Needed for Cough. - Oral    heparin (porcine) 5000 UNIT/ML injection 5,000 Units 5,000 Units Every 8 Hours Scheduled 3/17/2019     Sig - " "Route: Inject 1 mL under the skin into the appropriate area as directed Every 8 (Eight) Hours. - Subcutaneous    HYDROcodone-acetaminophen (NORCO) 5-325 MG per tablet 1 tablet 1 tablet Every 6 Hours PRN 3/15/2019 3/25/2019    Sig - Route: Take 1 tablet by mouth Every 6 (Six) Hours As Needed for Moderate Pain . - Oral    Influenza Vac Subunit Quad (FLUCELVAX) injection 0.5 mL 0.5 mL During Hospitalization 3/14/2019     Sig - Route: Inject 0.5 mL into the appropriate muscle as directed by prescriber During Hospitalization for Immunization. - Intramuscular    labetalol (NORMODYNE) tablet 200 mg 200 mg Every 8 Hours Scheduled 3/14/2019     Sig - Route: Take 1 tablet by mouth Every 8 (Eight) Hours. - Oral    ondansetron (ZOFRAN) injection 4 mg 4 mg Every 6 Hours PRN 3/14/2019     Sig - Route: Infuse 2 mL into a venous catheter Every 6 (Six) Hours As Needed for Nausea or Vomiting. - Intravenous    Linked Group 2:  \"Or\" Linked Group Details        ondansetron (ZOFRAN) tablet 4 mg 4 mg Every 8 Hours PRN 3/13/2019     Sig - Route: Take 1 tablet by mouth Every 8 (Eight) Hours As Needed for Nausea or Vomiting. - Oral    potassium chloride (MICRO-K) CR capsule 20 mEq 20 mEq Daily 3/17/2019     Sig - Route: Take 2 capsules by mouth Daily. - Oral    promethazine (PHENERGAN) injection 12.5 mg 12.5 mg Every 4 Hours PRN 3/14/2019     Sig - Route: Infuse 0.5 mL into a venous catheter Every 4 (Four) Hours As Needed for Nausea or Vomiting. - Intravenous    furosemide (LASIX) injection 40 mg (Discontinued) 40 mg Daily 3/18/2019 3/18/2019    Sig - Route: Infuse 4 mL into a venous catheter Daily. - Intravenous    furosemide (LASIX) tablet 20 mg (Discontinued) 20 mg Daily 3/16/2019 3/17/2019    Sig - Route: Take 1 tablet by mouth Daily. - Oral    furosemide (LASIX) tablet 20 mg (Discontinued) 20 mg 2 Times Daily (Diuretics) 3/17/2019 3/18/2019    Sig - Route: Take 1 tablet by mouth 2 (Two) Times a Day. - Oral    furosemide (LASIX) tablet " 40 mg (Discontinued) 40 mg Daily 3/18/2019 3/18/2019    Sig - Route: Take 1 tablet by mouth Daily. - Oral    lisinopril (PRINIVIL,ZESTRIL) tablet 10 mg (Discontinued) 10 mg Every 24 Hours Scheduled 3/14/2019 3/18/2019    Sig - Route: Take 1 tablet by mouth Daily. - Oral          Lab Results (last 72 hours)     Procedure Component Value Units Date/Time    Comprehensive Metabolic Panel [[id number]]  (Abnormal) Collected:  03/18/19 0633    Specimen:  Blood Updated:  03/18/19 0749     Glucose 79 mg/dL      BUN 13 mg/dL      Creatinine 0.60 mg/dL      Sodium 137 mmol/L      Potassium 4.3 mmol/L      Chloride 110 mmol/L      CO2 23.0 mmol/L      Calcium 7.8 mg/dL      Total Protein 4.5 g/dL      Albumin 2.53 g/dL      ALT (SGPT) 9 U/L      AST (SGOT) 12 U/L      Alkaline Phosphatase 107 U/L      Total Bilirubin 0.2 mg/dL      eGFR Non African Amer 110 mL/min/1.73      Globulin 2.0 gm/dL      A/G Ratio 1.3 g/dL      BUN/Creatinine Ratio 21.7     Anion Gap 4.0 mmol/L     Narrative:       National [employer] Guidelines    Stage     Description        GFR  1         Normal or High     90+  2         Mild decrease      60-89  3         Moderate decrease  30-59  4         Severe decrease    15-29  5         Kidney failure     <15    The MDRD GFR formula is only valid for adults with stable renal function between ages 18 and 70.    Troponin [[id number]]  (Abnormal) Collected:  03/18/19 0633    Specimen:  Blood Updated:  03/18/19 0749     Troponin I 0.065 ng/mL      Comment: Results may be falsely decreased if patient taking Biotin.             Imaging Results (last 72 hours)     ** No results found for the last 72 hours. **        Orders (last 72 hrs)     Start     Ordered    03/19/19 0900  furosemide (LASIX) injection 40 mg  Daily      03/18/19 0859    03/19/19 0001  NPO Diet  Diet Effective Midnight      03/18/19 0835    03/18/19 0945  furosemide (LASIX) injection 40 mg  Daily,   Status:  Discontinued      03/18/19 0858     03/18/19 0945  furosemide (LASIX) injection 20 mg  Once      03/18/19 0859    03/18/19 0930  furosemide (LASIX) tablet 40 mg  Daily,   Status:  Discontinued      03/18/19 0830    03/18/19 0836  Antibody Identification  Once      03/18/19 0835    03/18/19 0835  Obtain Informed Consent  Once      03/18/19 0835    03/18/19 0600  Type & Screen  Morning Draw      03/17/19 1043    03/18/19 0600  Comprehensive Metabolic Panel  Morning Draw      03/17/19 1043    03/18/19 0600  Troponin  Morning Draw      03/17/19 1043    03/18/19 0008  guaiFENesin (ROBITUSSIN) 100 MG/5ML oral solution 200 mg  Every 4 Hours PRN      03/18/19 0008    03/17/19 1800  furosemide (LASIX) tablet 20 mg  2 Times Daily (Diuretics),   Status:  Discontinued      03/17/19 1308    03/17/19 1717  PT Consult: Eval & Treat  Once      03/17/19 1717    03/17/19 1400  heparin (porcine) 5000 UNIT/ML injection 5,000 Units  Every 8 Hours Scheduled      03/17/19 1308    03/17/19 1145  potassium chloride (MICRO-K) CR capsule 20 mEq  Daily      03/17/19 1050    03/17/19 1107  Diet Regular; Cardiac  Diet Effective Now     Comments:  Need dinner tray ;; ordered chicken strips, baked potato with sour cream and butter    03/17/19 1107    03/16/19 0900  furosemide (LASIX) tablet 20 mg  Daily,   Status:  Discontinued      03/15/19 1307    03/16/19 0900  Daily Weights  Daily      03/16/19 0859    03/16/19 0836  bisacodyl (DULCOLAX) EC tablet 10 mg  Daily PRN      03/16/19 0836    03/16/19 0836  docusate sodium (COLACE) capsule 100 mg  2 Times Daily PRN      03/16/19 0836    03/15/19 1738  Diet Regular  Diet Effective Now,   Status:  Canceled     Comments:  Need dinner tray ;; ordered chicken strips, baked potato with sour cream and butter    03/15/19 1739    03/15/19 1527  HYDROcodone-acetaminophen (NORCO) 5-325 MG per tablet 1 tablet  Every 6 Hours PRN      03/15/19 1527    03/15/19 1000  Rho D Immune Globulin (RHOPHYLAC) injection 1,500 Units  Once      03/15/19 4232     03/15/19 0938  albuterol (PROVENTIL) nebulizer solution 0.083% 2.5 mg/3mL  Every 6 Hours PRN      03/15/19 0939    03/15/19 0857  HYDROcodone-acetaminophen (NORCO) 5-325 MG per tablet 2 tablet  Every 6 Hours PRN,   Status:  Discontinued      03/15/19 0859    03/15/19 0400  ceFAZolin in dextrose (ANCEF) IVPB solution 2 g  Every 8 Hours,   Status:  Discontinued      03/14/19 2019 03/14/19 2056  promethazine (PHENERGAN) injection 12.5 mg  Every 4 Hours PRN      03/14/19 2057 03/14/19 1800  furosemide (LASIX) tablet 20 mg  2 Times Daily (Diuretics),   Status:  Discontinued      03/14/19 1331    03/14/19 1430  lisinopril (PRINIVIL,ZESTRIL) tablet 10 mg  Every 24 Hours Scheduled,   Status:  Discontinued      03/14/19 1320    03/14/19 1415  labetalol (NORMODYNE) tablet 200 mg  Every 8 Hours Scheduled      03/14/19 1320    03/14/19 1325  Influenza Vac Subunit Quad (FLUCELVAX) injection 0.5 mL  During Hospitalization      03/14/19 1328    03/14/19 1319  labetalol (NORMODYNE,TRANDATE) injection 20-80 mg  Every 10 Minutes PRN      03/14/19 1320    03/14/19 1300  oxytocin in sodium chloride (PITOCIN) 30 UNIT/500ML infusion solution  Once,   Status:  Discontinued      03/14/19 1206    03/14/19 1206  diphenhydrAMINE (BENADRYL) capsule 25 mg  Every 4 Hours PRN      03/14/19 1206    03/14/19 1206  diphenhydrAMINE (BENADRYL) injection 25 mg  Every 4 Hours PRN      03/14/19 1206    03/14/19 1206  diphenhydrAMINE (BENADRYL) injection 25 mg  Every 4 Hours PRN      03/14/19 1206    03/14/19 1206  naloxone (NARCAN) injection 0.4 mg  Every 1 Hour PRN      03/14/19 1206    03/14/19 1206  acetaminophen (TYLENOL) tablet 650 mg  Every 4 Hours PRN      03/14/19 1206    03/14/19 1206  oxytocin in sodium chloride (PITOCIN) 30 UNIT/500ML infusion solution  Once,   Status:  Discontinued      03/14/19 1206    03/14/19 1206  ondansetron (ZOFRAN) injection 4 mg  Every 6 Hours PRN      03/14/19 1206    03/14/19 1030  lactated ringers infusion   Continuous,   Status:  Discontinued      03/14/19 0939    03/14/19 0930  dextrose 5 % and sodium chloride 0.2 % infusion  Continuous,   Status:  Discontinued      03/14/19 0840    03/14/19 0930  oxytocin in sodium chloride (PITOCIN) 30 UNIT/500ML infusion solution  Titrated,   Status:  Discontinued      03/14/19 0841    03/14/19 0400  butorphanol (STADOL) injection 1 mg  Every 2 Hours PRN      03/14/19 0359    03/13/19 2100  sodium chloride 0.9 % flush 3 mL  Every 12 Hours Scheduled,   Status:  Discontinued      03/13/19 1633    03/13/19 1632  ondansetron (ZOFRAN) tablet 4 mg  Every 8 Hours PRN      03/13/19 1633    03/13/19 1632  acetaminophen (TYLENOL) tablet 650 mg  Every 4 Hours PRN,   Status:  Discontinued      03/13/19 1633    03/13/19 1628  sodium chloride 0.9 % flush 1-10 mL  As Needed,   Status:  Discontinued      03/13/19 1633    Unscheduled  Blood Gas, Arterial  As Needed      03/14/19 1206    Signed and Held  Code Status and Medical Interventions:  Continuous      Signed and Held    Signed and Held  Vital Signs Per hospital policy  Per Hospital Policy      Signed and Held    Signed and Held  Up with Assistance  As Needed      Signed and Held    Signed and Held  Ambulate Patient  2 Times Daily     Comments:  After anesthesia wears off.    Signed and Held    Signed and Held  Patient May Shower  Once     Comments:  After anesthesia wears off and with assistance    Signed and Held    Signed and Held  I/O  Every Shift      Signed and Held    Signed and Held  Fundal and Lochia Check  Per Hospital Policy     Comments:  Q 30 min x 2, Q 1 hr x 4, Q 4 hrs x 24 hrs, then Q shift.    Signed and Held    Signed and Held  Continue Indwelling Urinary Catheter Already in Place  Once      Signed and Held    Signed and Held  Discontinue Indwelling Urinary Catheter in AM  Once      Signed and Held    Signed and Held  Bladder Scan if Patient Unable to Void 4-6 Hours After Catheter Removal  As Needed      Signed and Held     Signed and Held  Straight Cath Every 4-6 Hours As Needed If Patient is Unable to Void After 4-6 Hours, Bladder Scan Volume is Greater Than 350-500mL & Patient Has Symptoms of Bladder Discomfort / Distention  As Needed      Signed and Held    Signed and Held  Notify Provider if Bladder Distention Continues  Until Discontinued      Signed and Held    Signed and Held  Consult Pharmacist For Review of Medications That May Cause Urinary Retention - RN To Place Order for Consult it Needed  As Needed      Signed and Held    Signed and Held  Schedule / Prompt Voiding For Patients With Urinary Incontinence  As Needed      Signed and Held    Signed and Held  Catheter Care  Every Shift      Signed and Held    Signed and Held  Abdominal Wound Care  As Needed     Comments:  Postop day 1. Remove dressing and leave incision open to air.    Signed and Held    Signed and Held  KPad  As Needed     Comments:  PRN pain    Signed and Held    Signed and Held  Apply Ice Pack to Perineum  As Needed     Comments:  For 20 min q 2hrs    Signed and Held    Signed and Held  Apply Waffle Cushion  As Needed     Comments:  PRN perineal discomfort    Signed and Held    Signed and Held  Turn Cough Deep Breathe  Once      Signed and Held    Signed and Held  Incentive spirometry RT  Every Hour      Signed and Held    Signed and Held  Remove Abdominal Dressing  Once      Signed and Held    Signed and Held  Warm compress  As Needed      Signed and Held    Signed and Held  Breast pump to bed  Once      Signed and Held    Signed and Held  Apply ace wrap, tight bra, or binder  As Needed      Signed and Held    Signed and Held  Apply ice packs  As Needed      Signed and Held    Signed and Held  Notify Physician  Until Discontinued      Signed and Held    Signed and Held  Diet Regular  Diet Effective Now      Signed and Held    Signed and Held  Advance Diet as Tolerated  Until Discontinued      Signed and Held    Signed and Held  If indicated -- Please  administer RH Immunoglobulin based on results of cord blood evaluation and fetal screen lab tests, pharmacy to dispense  Continuous     Comments:  See process instructions for reference range details.    Signed and Held    Signed and Held  CBC & Differential  Timed      Signed and Held    Signed and Held  Insert Peripheral IV  Once      Signed and Held    Signed and Held  Saline Lock & Maintain IV Access  Continuous      Signed and Held    Signed and Held  sodium chloride 0.9 % flush 3 mL  Every 12 Hours Scheduled      Signed and Held    Signed and Held  sodium chloride 0.9 % flush 3-10 mL  As Needed      Signed and Held    Signed and Held  ondansetron (ZOFRAN) tablet 4 mg  Every 8 Hours PRN      Signed and Held    Signed and Held  promethazine (PHENERGAN) tablet 25 mg  Every 6 Hours PRN      Signed and Held    Signed and Held  promethazine (PHENERGAN) injection 12.5 mg  Every 6 Hours PRN      Signed and Held    Signed and Held  promethazine (PHENERGAN) suppository 12.5 mg  Every 6 Hours PRN      Signed and Held    Signed and Held  lanolin ointment  Every 1 Hour PRN      Signed and Held    Signed and Held  Place Sequential Compression Device  Once      Signed and Held    Signed and Held  Maintain Sequential Compression Device  Continuous      Signed and Held    Signed and Held  oxyCODONE-acetaminophen (PERCOCET) 7.5-325 MG per tablet 1 tablet  Every 4 Hours PRN      Signed and Held    Signed and Held  oxyCODONE-acetaminophen (PERCOCET)  MG per tablet 1 tablet  Every 4 Hours PRN      Signed and Held    Signed and Held  HYDROmorphone (DILAUDID) injection 0.5 mg  Every 2 Hours PRN      Signed and Held    Signed and Held  naloxone (NARCAN) injection 0.1 mg  Every 5 Minutes PRN      Signed and Held          Operative/Procedure Notes (last 72 hours) (Notes from 3/15/2019  9:34 AM through 3/18/2019  9:34 AM)     No notes of this type exist for this encounter.           Physician Progress Notes (last 72 hours) (Notes  from 3/15/2019  9:34 AM through 3/18/2019  9:34 AM)      Ramses Pardo III, MD at 3/18/2019  8:53 AM              Postpartum Progress Note    Patient name: Tika Pugh  YOB: 1975   MRN: [id number]  Referring Provider: Arash Jeffries   Admission Date: 3/13/2019  Date of Service: 3/18/2019    ID: [id number] y.o.     Diagnosis:   S/p  delivery 4 Days Post-Op   Patient Active Problem List   Diagnosis   • Chronic nonintractable headache   • Gastroesophageal reflux disease   • Maternal tobacco use in first trimester   • Rh negative status during pregnancy   • Anemia due to vitamin B12 deficiency   • Uterine leiomyoma   • AMA (advanced maternal age) multigravida 35+   • Carpal tunnel syndrome of left wrist   • Nephrotic range proteinuria   • Single umbilical artery   • Poor fetal growth affecting management of mother in third trimester   • Morbid obesity with BMI of 45.0-49.9, adult (CMS/HCC)   • Pre-eclampsia in third trimester   • Gestational hypertension       Subjective:      No complaints.  Moderate lochia.  Ambulating, voiding, tolerating diet.  Pain well controlled. Denies chest pain  The patient is currently breastfeeding.       Objective:      Vital signs:  Vital Signs Range for the last 24 hours  Temperature: Temp:  [97.7 °F (36.5 °C)-98.5 °F (36.9 °C)] 98.5 °F (36.9 °C)   Temp Source: Temp src: Oral   BP: BP: (117-143)/(53-70) 126/58   Pulse: Heart Rate:  [62-75] 67   Respirations: Resp:  [16-20] 18   Weight: (!) 138 kg (305 lb 5.4 oz)     General: Alert & oriented x4, in no apparent distress  Chest: faint end inspiratory wheezes, normal air movement  Heart: RRR without murmurs, gallops or rubs.  Abdomen: soft, nontender  Uterus: firm, nontender  Incision: clean, dry, intact, dressing clean, suture in place.  Extremities: nontender; continued 4+ lower extremity edema    Labs:  Lab Results   Component Value Date    WBC 16.20 (H) 03/15/2019    HGB 9.9 (L) 03/15/2019    HCT 31.0  (L) 03/15/2019    MCV 96.6 03/15/2019     (L) 03/15/2019     Results from last 7 days   Lab Units 19  0633   ABO TYPING  A   RH TYPING  Negative     External Prenatal Results     Pregnancy Outside Results - Transcribed From Office Records - See Scanned Records For Details     Test Value Date Time    Hgb 9.9 g/dL 03/15/19 0752    Hct 31.0 % 03/15/19 0752    ABO A  19 0633    Rh Negative  19 0633    Antibody Screen Positive  19 0633    Glucose Fasting GTT       Glucose Tolerance Test 1 hour       Glucose Tolerance Test 3 hour       Gonorrhea (discrete) Negative  18     Chlamydia (discrete) Negative  18     RPR Non Reactive  18 1539    VDRL       Syphilis Antibody       Rubella 12.50 index 18 1539    HBsAg Negative  18 1539    Herpes Simplex Virus PCR       Herpes Simplex VIrus Culture       HIV Non Reactive  18 1539    Hep C RNA Quant PCR       Hep C Antibody 0.1 s/co ratio 18 1539    AFP       Group B Strep Negative  19 1640    GBS Susceptibility to Clindamycin       GBS Susceptibility to Erythromycin       Fetal Fibronectin       Genetic Testing, Maternal Blood             Drug Screening     Test Value Date Time    Urine Drug Screen       Amphetamine Screen       Barbiturate Screen       Benzodiazepine Screen       Methadone Screen       Phencyclidine Screen       Opiates Screen       THC Screen       Cocaine Screen       Propoxyphene Screen       Buprenorphine Screen       Methamphetamine Screen       Oxycodone Screen       Tricyclic Antidepressants Screen                   Assessment/Plan:      4 Days Post-Op s/p Procedure(s):   SECTION PRIMARY  1. S/p  delivery: Continue postoperative care.  Doing well.  2. Schedule to undergo cardiac catheterization tomorrow.  I spent 15 minutes with this patient of which greater than 50% was face to face counseling and coordination of care. Questions were  answered.          Electronically signed by Ramses Pardo III, MD at 3/18/2019  8:59 AM     Eb Spence MD at 3/18/2019  8:31 AM          [facility] at [facility]    Inpatient Progress Note      Chief Complaint/Reason for service:    · Volume overload, elevated troponin, abnormal echocardiogram    Subjective     Subjective:       Patient resting in bed. Denies chest pain or shortness of breath. Continued significant lower extremity edema. Has a cough, makes sleeping difficult    Past medical, surgical, social and family history reviewed in the patient's electronic medical record.    Review of Systems:   Positive for lower extremity swelling  Negative for exertional chest pain, dyspnea with exertion, palpitations     Problem List  Active Hospital Problems    Diagnosis  POA   • Gestational hypertension [O13.9]  Yes      Resolved Hospital Problems   No resolved problems to display.       Objective     Objective:      Current Facility-Administered Medications:   •  acetaminophen (TYLENOL) tablet 650 mg, 650 mg, Oral, Q4H PRN, Monse Puri CRNA  •  albuterol (PROVENTIL) nebulizer solution 0.083% 2.5 mg/3mL, 2.5 mg, Nebulization, Q6H PRN, Dannie Becerra DO, 2.5 mg at 03/17/19 1829  •  bisacodyl (DULCOLAX) EC tablet 10 mg, 10 mg, Oral, Daily PRN, Ramses Pardo III, MD  •  butorphanol (STADOL) injection 1 mg, 1 mg, Intravenous, Q2H PRN, Eliseo Morillo MD, 1 mg at 03/14/19 0588  •  diphenhydrAMINE (BENADRYL) capsule 25 mg, 25 mg, Oral, Q4H PRN **OR** diphenhydrAMINE (BENADRYL) injection 25 mg, 25 mg, Intravenous, Q4H PRN **OR** diphenhydrAMINE (BENADRYL) injection 25 mg, 25 mg, Intramuscular, Q4H PRN, Monse Puri CRNA  •  docusate sodium (COLACE) capsule 100 mg, 100 mg, Oral, BID PRN, Ramses Pardo III, MD  •  furosemide (LASIX) tablet 40 mg, 40 mg, Oral, Daily, Eileen Wisdom, APRN  •  guaiFENesin (ROBITUSSIN) 100 MG/5ML oral solution 200 mg, 200 mg, Oral, Q4H PRN, High,  Arsen YOUNG MD, 200 mg at 03/18/19 0029  •  heparin (porcine) 5000 UNIT/ML injection 5,000 Units, 5,000 Units, Subcutaneous, Q8H, Dannie Becerra DO, 5,000 Units at 03/18/19 0537  •  HYDROcodone-acetaminophen (NORCO) 5-325 MG per tablet 1 tablet, 1 tablet, Oral, Q6H PRN, Dannie Becerra DO, 1 tablet at 03/17/19 0921  •  Influenza Vac Subunit Quad (FLUCELVAX) injection 0.5 mL, 0.5 mL, Intramuscular, During Hospitalization, Ramses Pardo III, MD  •  labetalol (NORMODYNE) tablet 200 mg, 200 mg, Oral, Q8H, Ramses Pardo III, MD, 200 mg at 03/17/19 2344  •  lisinopril (PRINIVIL,ZESTRIL) tablet 10 mg, 10 mg, Oral, Q24H, Ramses Pardo III, MD, 10 mg at 03/17/19 0853  •  [DISCONTINUED] ondansetron (ZOFRAN) tablet 4 mg, 4 mg, Oral, Q6H PRN **OR** ondansetron (ZOFRAN) injection 4 mg, 4 mg, Intravenous, Q6H PRN, Ramses Pardo III, MD, 4 mg at 03/15/19 0000  •  ondansetron (ZOFRAN) tablet 4 mg, 4 mg, Oral, Q8H PRN, Ramses Pardo III, MD  •  potassium chloride (MICRO-K) CR capsule 20 mEq, 20 mEq, Oral, Daily, Dannie Becerra DO, 20 mEq at 03/17/19 1600  •  promethazine (PHENERGAN) injection 12.5 mg, 12.5 mg, Intravenous, Q4H PRN, Arsen Barba MD, 12.5 mg at 03/14/19 2102    Vital Sign Min/Max for last 24 hours  Temp  Min: 97.7 °F (36.5 °C)  Max: 98.4 °F (36.9 °C)   BP  Min: 117/58  Max: 143/70   Pulse  Min: 71  Max: 75   Resp  Min: 16  Max: 20   SpO2  Min: 100 %  Max: 100 %   No Data Recorded      Intake/Output Summary (Last 24 hours) at 3/18/2019 0831  Last data filed at 3/18/2019 0410  Gross per 24 hour   Intake 940 ml   Output 2145 ml   Net -1205 ml           CONSTITUTIONAL: No acute distress, normal affect  RESPIRATORY: Normal effort. Clear to auscultation bilaterally without wheezing or rales  CARDIOVASCULAR: Regular rate and rhythm with normal S1 and S2. Without murmur, gallop or rub.  PERIPHERAL VASCULAR: Normal radial pulses bilaterally. There is significant peripheral edema bilaterally  through the hip level.    Results Review:   Lab Results   Component Value Date    TROPONINI 0.065 (H) 03/18/2019       BUN   Date Value Ref Range Status   03/18/2019 13 9 - 23 mg/dL Final     Creatinine   Date Value Ref Range Status   03/18/2019 0.60 0.60 - 1.30 mg/dL Final     Potassium   Date Value Ref Range Status   03/18/2019 4.3 3.5 - 5.5 mmol/L Final     ALT (SGPT)   Date Value Ref Range Status   03/18/2019 9 7 - 40 U/L Final     AST (SGOT)   Date Value Ref Range Status   03/18/2019 12 0 - 33 U/L Final       No results found for: CHOL  No results found for: TRIG  No results found for: HDL  No results found for: LDLC  No results found for: LDL  No components found for: LDLDIRECTC        Tele:  NSR       TTE 3/2019  · Estimated EF = 45-50%  · LV apical hypokinesia.  · Left ventricular wall thickness is consistent with mild concentric hypertrophy.  · Mild mitral valve regurgitation is present       Assessment     Assessment/Plan:     ASSESSMENT:  -Chest pressure, dyspnea, volume overload during pregnancy. Acute systolic HF symptoms. Improving with diuresis.  Echocardiogram with EF 45- 50% with LV apical hypokinesia and mild MR. EKG with PRWP. Troponin 0.059 this AM. Risk factors for CAD include tobacco abuse and familial history. Findings concerning for peripartum cardiomyopathy vs spontaneous coronary artery dissection (SCAD) vs traditional ACS/NSTEMI. The clinical presentation and nature of the wall motion abnormality on echo seems most consistent with SCAD at this time.   -35 weeks pregnant, now s/p C section  -Obesity  -Hypertension     PLAN:  -HF: Continue beta blocker. Lasix 40mg PO given once today already. Please give an additional Lasix 20mg IVP once this AM. Change to Lasix to 40mg IVP daily for now starting again tomorrow. Possible ACE-I cough. Uncertain use of losartan in breastfeeding. Will hold off both ACEi and ARBs for now.    -Elevated troponin, wall motion abnormality on TTE concerning for ACS:  Recommend ischemic evaluation with a left heart cath tomorrow, NPO p midnight.  Continue beta blocker.  Depending on heart catheterization results, will decide on aspirin and/or Plavix.  We'll need to consider whether not the patient intends to breastfeed.    Scribed for Dr. Spence by LISSETH Larsen. 3/18/2019  8:33 AM      Electronically signed by LISSETH Larsen, 19, 8:33 AM.    I, Eb Spence MD, personally performed the services as scribed by the above named individual. I have made any necessary edits and it is both accurate and complete.     Eb Spence MD, MSc, [facility]  Interventional Cardiology  [facility]          Electronically signed by Eb Spence MD at 3/18/2019  9:01 AM     Dannie Becerra DO at 3/17/2019 10:51 AM              Postpartum Progress Note    Patient name: Tika Pugh  YOB: 1975   MRN: [id number]  Referring Provider: Arash Jeffries*  Admission Date: 3/13/2019  Date of Service: 3/17/2019    ID: 43 y.o.     Diagnosis:   S/p  delivery 3 Days Post-Op   Patient Active Problem List   Diagnosis   • Chronic nonintractable headache   • Gastroesophageal reflux disease   • Maternal tobacco use in first trimester   • Rh negative status during pregnancy   • Anemia due to vitamin B12 deficiency   • Uterine leiomyoma   • AMA (advanced maternal age) multigravida 35+   • Carpal tunnel syndrome of left wrist   • Nephrotic range proteinuria   • Single umbilical artery   • Poor fetal growth affecting management of mother in third trimester   • Morbid obesity with BMI of 45.0-49.9, adult (CMS/[facility])   • Pre-eclampsia in third trimester   • Gestational hypertension       Subjective:      No complaints.  Light lochia.  Ambulating, voiding, tolerating diet.  Pain well controlled.  The patient is currently breastfeeding.   This baby is in NICU.    Objective:      Vital signs:  Vital Signs Range for the last 24  hours  Temperature: Temp:  [98.1 °F (36.7 °C)-99 °F (37.2 °C)] 98.4 °F (36.9 °C)   Temp Source: Temp src: Oral   BP: BP: (109-135)/(53-60) 135/60   Pulse: Heart Rate:  [67-76] 72   Respirations: Resp:  [16-18] 18   Weight: (!) 137 kg (302 lb 6.4 oz)     General: Alert & oriented x4, in no apparent distress  Neg JVD  Abdomen: soft, appropriately tender bowel sounds present  Uterus: firm, appropriately tender  Incision: clean, dry, intact, dressing clean, suture  Extremities: nontender; 2 +  edema  SCUDS present    Labs:  Lab Results   Component Value Date    WBC 16.20 (H) 03/15/2019    HGB 9.9 (L) 03/15/2019    HCT 31.0 (L) 03/15/2019    MCV 96.6 03/15/2019     (L) 03/15/2019     Results from last 7 days   Lab Units 03/14/19  1331   ABO TYPING  A   RH TYPING  Negative     External Prenatal Results     Pregnancy Outside Results - Transcribed From Office Records - See Scanned Records For Details     Test Value Date Time    Hgb 9.9 g/dL 03/15/19 0752    Hct 31.0 % 03/15/19 0752    ABO A  03/14/19 1331    Rh Negative  03/14/19 1331    Antibody Screen Positive  03/13/19 1716    Glucose Fasting GTT       Glucose Tolerance Test 1 hour       Glucose Tolerance Test 3 hour       Gonorrhea (discrete) Negative  08/28/18     Chlamydia (discrete) Negative  08/28/18     RPR Non Reactive  08/28/18 1539    VDRL       Syphilis Antibody       Rubella 12.50 index 08/28/18 1539    HBsAg Negative  08/28/18 1539    Herpes Simplex Virus PCR       Herpes Simplex VIrus Culture       HIV Non Reactive  08/28/18 1539    Hep C RNA Quant PCR       Hep C Antibody 0.1 s/co ratio 08/28/18 1539    AFP       Group B Strep Negative  03/12/19 1640    GBS Susceptibility to Clindamycin       GBS Susceptibility to Erythromycin       Fetal Fibronectin       Genetic Testing, Maternal Blood             Drug Screening     Test Value Date Time    Urine Drug Screen       Amphetamine Screen       Barbiturate Screen       Benzodiazepine Screen        Methadone Screen       Phencyclidine Screen       Opiates Screen       THC Screen       Cocaine Screen       Propoxyphene Screen       Buprenorphine Screen       Methamphetamine Screen       Oxycodone Screen       Tricyclic Antidepressants Screen                   Assessment/Plan:      3 Days Post-Op s/p Procedure(s):   SECTION PRIMARY  1. POD # 3 after a primary LTCS (2 layer closure) doing well.  2.  Preeclampsia-blood pressure stable on lisinopril and labetalol  3.  Cardiomyopathy stable on lisinopril, beta-blocker, daily diuretics daily wt stable  4.  Morbid obesity  5.  Advanced maternal age  6.  Thrombocytopenia-stable  7.  Tobacco abuse  8.   Maternal blood type Rh-, patient received RhoGam 3-15-19  9.   Blood loss anemia    PLAN  1.  Discontinue KIKO drain  2.  DVT prophylaxis SCUDS, SQ heparin, and Encourage ambulation  3.  Reevaluation in a.m. by cardiology (probable heart catheter later this week)  4.  Repeat labs in a.m.  5.   Increase lasix  To bid Start on daily potassium supplement  6.  Continue with incentive spirometry and as needed neb treatment  7.  D/W DR Spence (interventional cardiologist)    . Questions were answered.          Electronically signed by Dannie Becerra DO at 3/17/2019  1:11 PM     Dannie Becerra DO at 3/16/2019  9:18 AM              Postpartum Progress Note    Patient name: Tika Pugh  YOB: 1975   MRN: [id number]  Referring Provider: Arash Jeffries*  Admission Date: 3/13/2019  Date of Service: 3/16/2019    ID: 43 y.o.     Diagnosis:   S/p  delivery 2 Days Post-Op   Patient Active Problem List   Diagnosis   • Chronic nonintractable headache   • Gastroesophageal reflux disease   • Maternal tobacco use in first trimester   • Rh negative status during pregnancy   • Anemia due to vitamin B12 deficiency   • Uterine leiomyoma   • AMA (advanced maternal age) multigravida 35+   • Carpal tunnel syndrome of left wrist   • Nephrotic  range proteinuria   • Single umbilical artery   • Poor fetal growth affecting management of mother in third trimester   • Morbid obesity with BMI of 45.0-49.9, adult (CMS/HCC)   • Pre-eclampsia in third trimester   • Gestational hypertension       Subjective:      No complaints.  Moderate lochia.  Ambulating, voiding, tolerating diet.  Pain well controlled.  The patient is currently breastfeeding.   This baby is a [unfilled].    Objective:      Vital signs:  Vital Signs Range for the last 24 hours  Temperature: Temp:  [98.2 °F (36.8 °C)-98.5 °F (36.9 °C)] 98.5 °F (36.9 °C)   Temp Source: Temp src: Oral   BP: BP: ()/(54-62) 132/62   Pulse: Heart Rate:  [61-68] 68   Respirations: Resp:  [16-18] 16   Weight: (!) 137 kg (302 lb)     General: Alert & oriented x4, in no apparent distress  Abdomen: soft, appropriately tender   KIKO drain with a scant amount   Uterus: firm, appropriately tender  Incision: clean, dry, intact, dressing clean, suture  Extremities: nontender;  2+ edema      Labs:  Lab Results   Component Value Date    WBC 16.20 (H) 03/15/2019    HGB 9.9 (L) 03/15/2019    HCT 31.0 (L) 03/15/2019    MCV 96.6 03/15/2019     (L) 03/15/2019     Results from last 7 days   Lab Units 03/14/19  1331   ABO TYPING  A   RH TYPING  Negative     External Prenatal Results     Pregnancy Outside Results - Transcribed From Office Records - See Scanned Records For Details     Test Value Date Time    Hgb 9.9 g/dL 03/15/19 0752    Hct 31.0 % 03/15/19 0752    ABO A  03/14/19 1331    Rh Negative  03/14/19 1331    Antibody Screen Positive  03/13/19 1716    Glucose Fasting GTT       Glucose Tolerance Test 1 hour       Glucose Tolerance Test 3 hour       Gonorrhea (discrete) Negative  08/28/18     Chlamydia (discrete) Negative  08/28/18     RPR Non Reactive  08/28/18 1539    VDRL       Syphilis Antibody       Rubella 12.50 index 08/28/18 1539    HBsAg Negative  08/28/18 1539    Herpes Simplex Virus PCR       Herpes Simplex  VIrus Culture       HIV Non Reactive  18 1539    Hep C RNA Quant PCR       Hep C Antibody 0.1 s/co ratio 18 1539    AFP       Group B Strep Negative  19 1640    GBS Susceptibility to Clindamycin       GBS Susceptibility to Erythromycin       Fetal Fibronectin       Genetic Testing, Maternal Blood             Drug Screening     Test Value Date Time    Urine Drug Screen       Amphetamine Screen       Barbiturate Screen       Benzodiazepine Screen       Methadone Screen       Phencyclidine Screen       Opiates Screen       THC Screen       Cocaine Screen       Propoxyphene Screen       Buprenorphine Screen       Methamphetamine Screen       Oxycodone Screen       Tricyclic Antidepressants Screen                   Assessment/Plan:      2 Days Post-Op s/p Procedure(s):   SECTION PRIMARY  1. POD # 2 after a primary LTCS (closure).  Doing well.  2.  Preeclampsia-blood pressure stable on lisinopril and labetalol  3.  Cardiomyopathy stable on lisinopril, beta-blocker, daily diuretics  4.  Morbid obesity  5.  Advanced maternal age  6.  Thrombocytopenia stable  7.  Tobacco abuse  8.  Maternal blood type Rh- patient received RhoGam 3/15/19  9 blood loss anemia (mild) postop H&H 9.    PLAN  1.  Continue postoperative care  2.  Encourage ambulation and pulmonary toilet  3.  Cardiology plans to perform heart cath early next week    . Questions were answered.          Electronically signed by Dannie Becerra DO at 3/16/2019  9:25 AM     Eb Spence MD at 3/15/2019  1:32 PM          [facility] at [facility]    Inpatient Progress Note      Chief Complaint/Reason for service:    · Volume overload, elevated troponin, abnormal echocardiogram    Subjective     Subjective:       Denies chest pain or shortness of breath overnight.  Swelling is improved but still very significant.    Past medical, surgical, social and family history reviewed in the patient's electronic medical  record.    Review of Systems:   Positive for lower extremity swelling  Negative for exertional chest pain, dyspnea with exertion, palpitations     Problem List  Active Hospital Problems    Diagnosis  POA   • Gestational hypertension [O13.9]  Yes      Resolved Hospital Problems   No resolved problems to display.       Objective     Objective:      Current Facility-Administered Medications:   •  acetaminophen (TYLENOL) tablet 650 mg, 650 mg, Oral, Q4H PRN, Ramses Pardo III, MD  •  acetaminophen (TYLENOL) tablet 650 mg, 650 mg, Oral, Q4H PRN, Monse Puri CRNA  •  albuterol (PROVENTIL) nebulizer solution 0.083% 2.5 mg/3mL, 2.5 mg, Nebulization, Q6H PRN, Dannie Becerra DO, 2.5 mg at 03/15/19 1051  •  butorphanol (STADOL) injection 1 mg, 1 mg, Intravenous, Q2H PRN, Eliseo Morillo MD, 1 mg at 03/14/19 0557  •  dextrose 5 % and sodium chloride 0.2 % infusion, 75 mL/hr, Intravenous, Continuous, Ramses Pardo III, MD, Stopped at 03/15/19 0920  •  diphenhydrAMINE (BENADRYL) capsule 25 mg, 25 mg, Oral, Q4H PRN **OR** diphenhydrAMINE (BENADRYL) injection 25 mg, 25 mg, Intravenous, Q4H PRN **OR** diphenhydrAMINE (BENADRYL) injection 25 mg, 25 mg, Intramuscular, Q4H PRN, Monse Puri CRNA  •  [START ON 3/16/2019] furosemide (LASIX) tablet 20 mg, 20 mg, Oral, Daily, Dannie Becerra DO  •  HYDROcodone-acetaminophen (NORCO) 5-325 MG per tablet 2 tablet, 2 tablet, Oral, Q6H PRN, Dannie Becerra DO, 1 tablet at 03/15/19 0918  •  Influenza Vac Subunit Quad (FLUCELVAX) injection 0.5 mL, 0.5 mL, Intramuscular, During Hospitalization, Ramses Pardo III, MD  •  labetalol (NORMODYNE) tablet 200 mg, 200 mg, Oral, Q8H, Ramses Pardo III, MD, 200 mg at 03/15/19 0850  •  lactated ringers infusion, 125 mL/hr, Intravenous, Continuous, Ramses Pardo III, MD, Last Rate: 125 mL/hr at 03/14/19 1017  •  lisinopril (PRINIVIL,ZESTRIL) tablet 10 mg, 10 mg, Oral, Q24H, Ramses Pardo III, MD, 10 mg at 03/15/19  0850  •  [DISCONTINUED] ondansetron (ZOFRAN) tablet 4 mg, 4 mg, Oral, Q6H PRN **OR** ondansetron (ZOFRAN) injection 4 mg, 4 mg, Intravenous, Q6H PRN, Ramses Pardo III, MD, 4 mg at 03/15/19 0000  •  ondansetron (ZOFRAN) tablet 4 mg, 4 mg, Oral, Q8H PRN, Ramses Pardo III, MD  •  oxytocin in sodium chloride (PITOCIN) 30 UNIT/500ML infusion solution, 2 lacy-units/min, Intravenous, Titrated, Ramses Pardo III, MD, Stopped at 03/14/19 0922  •  oxytocin in sodium chloride (PITOCIN) 30 UNIT/500ML infusion solution, 650 mL/hr, Intravenous, Once **FOLLOWED BY** oxytocin in sodium chloride (PITOCIN) 30 UNIT/500ML infusion solution, 85 mL/hr, Intravenous, Once, Ramses Pardo III, MD  •  promethazine (PHENERGAN) injection 12.5 mg, 12.5 mg, Intravenous, Q4H PRN, Arsen Barba MD, 12.5 mg at 03/14/19 2102  •  sodium chloride 0.9 % flush 1-10 mL, 1-10 mL, Intravenous, PRN, Ramses Pardo III, MD  •  sodium chloride 0.9 % flush 3 mL, 3 mL, Intravenous, Q12H, Ramses Pardo III, MD, Stopped at 03/14/19 0802    Vital Sign Min/Max for last 24 hours  Temp  Min: 98 °F (36.7 °C)  Max: 98.5 °F (36.9 °C)   BP  Min: 96/54  Max: 150/72   Pulse  Min: 56  Max: 75   Resp  Min: 16  Max: 18   No Data Recorded   No Data Recorded      Intake/Output Summary (Last 24 hours) at 3/15/2019 1333  Last data filed at 3/15/2019 1007  Gross per 24 hour   Intake 150 ml   Output 1600 ml   Net -1450 ml           CONSTITUTIONAL: No acute distress, normal affect  RESPIRATORY: Normal effort. Clear to auscultation bilaterally without wheezing or rales  CARDIOVASCULAR: Regular rate and rhythm with normal S1 and S2. Without murmur, gallop or rub.  PERIPHERAL VASCULAR: Normal radial pulses bilaterally. There is peripheral edema bilaterally.    Results Review:   Lab Results   Component Value Date    TROPONINI 0.054 (H) 03/15/2019       BUN   Date Value Ref Range Status   03/15/2019 13 9 - 23 mg/dL Final     Creatinine   Date Value Ref Range  Status   03/15/2019 0.93 0.60 - 1.30 mg/dL Final   03/15/2019 0.95 0.60 - 1.30 mg/dL Final     Potassium   Date Value Ref Range Status   03/15/2019 4.5 3.5 - 5.5 mmol/L Final     ALT (SGPT)   Date Value Ref Range Status   03/15/2019 12 7 - 40 U/L Final   03/15/2019 12 7 - 40 U/L Final     AST (SGOT)   Date Value Ref Range Status   03/15/2019 20 0 - 33 U/L Final   03/15/2019 20 0 - 33 U/L Final       No results found for: CHOL  No results found for: TRIG  No results found for: HDL  No results found for: LDLC  No results found for: LDL  No components found for: LDLDIRECTC        Tele:  NSR    EKG:  Normal Sinus Rhythm, Anterior infarct , age undetermined      TTE 3/2019  · Estimated EF = 45-50%  · LV apical hypokinesia.  · Left ventricular wall thickness is consistent with mild concentric hypertrophy.  · Mild mitral valve regurgitation is present         Assessment     Assessment/Plan:     ASSESSMENT:  -Chest pressure, dyspnea, volume overload during pregnancy. Acute systolic HF symptoms. Improving with diuresis.  Echocardiogram with EF 45- 50% with LV apical hypokinesia and mild MR. EKG with PRWP. Troponin 0.059 this AM. Risk factors for CAD include tobacco abuse and familial history. Findings concerning for peripartum cardiomyopathy vs spontaneous coronary artery dissection (SCAD) vs traditional ACS/NSTEMI. The clinical presentation and nature of the wall motion abnormality on echo seems most consistent with SCAD at this time.   -35 weeks pregnant, now s/p C section  -Obesity  -Hypertension     PLAN:  -HF: continue beta blocker, lisinopril. Decreased dose of Lasix to 20mg IVP daily, but will likely benefit from longer term therapy  -Elevated troponin, wall motion abnormality on TTE concerning for ACS: Recommend ischemic evaluation with a left heart cath next Tuesday, NPO p midnight on Monday.  Continue beta blocker.  Depending on heart catheterization results, will decide on aspirin and/or Plavix.  We'll need to  consider whether not the patient intends to breast-feed    -Discussed with Dr Becerra  -Will see on an as needed basis over the weekend. Please feel free to call with any questions or concerns.    Eb Spence MD, MSc, [facility]  Interventional Cardiology  [facility] at [facility]  3/15/2019        Electronically signed by Eb Spence MD at 3/15/2019  5:48 PM       Consult Notes (last 72 hours) (Notes from 3/15/2019  9:34 AM through 3/18/2019  9:34 AM)     No notes of this type exist for this encounter.

## 2019-03-18 NOTE — LACTATION NOTE
Encouraged mom to call about breast pump today.  Discussed mom's ordering options.  Mom plans to call [employer] and  pump on her way home.  Still pumping q 3 hours per mom but not obtaining any breast milk yet.

## 2019-03-18 NOTE — THERAPY EVALUATION
Acute Care - Physical Therapy Initial Evaluation  [facility]     Patient Name: Tika Pugh  : 1975  MRN: [id number]  Today's Date: 3/18/2019   Onset of Illness/Injury or Date of Surgery: 19  Date of Referral to PT: 19  Referring Physician: MD Mariam      Admit Date: 3/13/2019    Visit Dx:     ICD-10-CM ICD-9-CM   1. Impaired functional mobility, balance, gait, and endurance Z74.09 V49.89   2. Gestational hypertension, third trimester O13.3 642.33   3. Status post primary low transverse  section Z98.891 V45.89     Patient Active Problem List   Diagnosis   • Chronic nonintractable headache   • Gastroesophageal reflux disease   • Maternal tobacco use in first trimester   • Rh negative status during pregnancy   • Anemia due to vitamin B12 deficiency   • Uterine leiomyoma   • AMA (advanced maternal age) multigravida 35+   • Carpal tunnel syndrome of left wrist   • Nephrotic range proteinuria   • Single umbilical artery   • Poor fetal growth affecting management of mother in third trimester   • Morbid obesity with BMI of 45.0-49.9, adult (CMS/HCC)   • Pre-eclampsia in third trimester   • Gestational hypertension     Past Medical History:   Diagnosis Date   • Asthma     As a child   • Carpal tunnel syndrome during pregnancy    • Frequent UTI    • Heart burn    • Hx of gastric ulcer    • Kidney stone    • Migraine    • Rh negative status during pregnancy 2018   • Ulcer of abdomen wall (CMS/HCC)    • Urinary tract infection      Past Surgical History:   Procedure Laterality Date   •  SECTION N/A 3/14/2019    Procedure:  SECTION PRIMARY;  Surgeon: Ramses Pardo III, MD;  Location: [facility] DELIVERY;  Service: Obstetrics/Gynecology   • KNEE ACL RECONSTRUCTION Left    • TONSILLECTOMY          PT ASSESSMENT (last 12 hours)      Physical Therapy Evaluation     Row Name 19 1315          PT Evaluation Time/Intention    Subjective Information  complains  of;weakness;swelling  -SJ     Document Type  evaluation  -SJ     Mode of Treatment  individual therapy  -SJ     Patient Effort  good  -SJ     Symptoms Noted During/After Treatment  none  -SJ     Row Name 19 1315          General Information    Patient Profile Reviewed?  yes  -SJ     Onset of Illness/Injury or Date of Surgery  19  -SJ     Referring Physician  MD Mariam  -SJ     Patient Observations  alert;cooperative;agree to therapy  -SJ     Patient/Family Observations  no family or visitors present  -SJ     General Observations of Patient  Pt sitting up in bed, awake. BLE's with pitting edema from toes to hips  -SJ     Prior Level of Function  independent:;all household mobility;community mobility;gait;transfer;bed mobility;ADL's;work  -SJ     Equipment Currently Used at Home  none  -SJ     Pertinent History of Current Functional Problem  43 y.o.F POD3  section, with peripartum cardiomyopathy, BLE edema. Pt's baby in NICU.  -SJ     Existing Precautions/Restrictions  no known precautions/restrictions  -SJ     Equipment Issued to Patient  long handled sponge;leg ;sock aid  -SJ     Risks Reviewed  patient:;LOB;increased discomfort  -SJ     Benefits Reviewed  patient:;improve function;increase independence;increase strength;increase knowledge;increase balance  -SJ     Barriers to Rehab  medically complex  -SJ     Row Name 19 1315          Relationship/Environment    Primary Source of Support/Comfort  significant other  -SJ     Primary Roles/Responsibilities  parent;wage earner, full time  -SJ     Row Name 19 1315          Resource/Environmental Concerns    Current Living Arrangements  home/apartment/condo  -SJ     Resource/Environmental Concerns  none  -SJ     Row Name 19 1315          Cognitive Assessment/Intervention- PT/OT    Orientation Status (Cognition)  oriented x 4  -SJ     Follows Commands (Cognition)  WNL  -SJ     Row Name 19 1315          Safety Issues,  Functional Mobility    Impairments Affecting Function (Mobility)  balance;endurance/activity tolerance;range of motion (ROM);strength  -SJ     Row Name 03/18/19 1315          Bed Mobility Assessment/Treatment    Bed Mobility Assessment/Treatment  supine-sit;sit-supine  -SJ     Supine-Sit [place] (Bed Mobility)  minimum assist (75% patient effort);verbal cues;1 person assist  -SJ     Sit-Supine [place] (Bed Mobility)  minimum assist (75% patient effort);1 person assist;verbal cues  -SJ     Bed Mobility, Safety Issues  decreased use of legs for bridging/pushing  -SJ     Assistive Device (Bed Mobility)  head of bed elevated  -SJ     Comment (Bed Mobility)  assist for LE's  -SJ     Row Name 03/18/19 1315          Transfer Assessment/Treatment    Transfer Assessment/Treatment  sit-stand transfer;stand-sit transfer  -SJ     Comment (Transfers)  no LOB  -SJ     Sit-Stand [place] (Transfers)  contact guard;verbal cues  -SJ     Stand-Sit [place] (Transfers)  contact guard;1 person assist  -SJ     Row Name 03/18/19 1315          Sit-Stand Transfer    Assistive Device (Sit-Stand Transfers)  walker, front-wheeled  -SJ     Row Name 03/18/19 1315          Stand-Sit Transfer    Assistive Device (Stand-Sit Transfers)  walker, front-wheeled  -SJ     Row Name 03/18/19 1315          Gait/Stairs Assessment/Training    [place] Level (Gait)  contact guard;verbal cues  -SJ     Assistive Device (Gait)  walker, front-wheeled  -SJ     Distance in Feet (Gait)  100  -SJ     Pattern (Gait)  step-through  -SJ     Deviations/Abnormal Patterns (Gait)  base of support, wide;gait speed decreased  -SJ     Bilateral Gait Deviations  weight shift ability decreased  -SJ     Comment (Gait/Stairs)  wide BELEN due to body mass and LE swelling  -SJ     Row Name 03/18/19 1315          General ROM    GENERAL ROM COMMENTS  LLE knee flex limited by 25%  -SJ     Row Name 03/18/19 1315          MMT (Manual Muscle Testing)    General MMT  Comments  BLE's 4+/5  -SJ     Row Name 03/18/19 1315          Motor Assessment/Intervention    Additional Documentation  Balance (Group)  -SJ     Row Name 03/18/19 1315          Balance    Balance  static sitting balance;static standing balance  -SJ     Row Name 03/18/19 1315          Static Sitting Balance    Level of [place] (Unsupported Sitting, Static Balance)  independent  -SJ     Row Name 03/18/19 1315          Static Standing Balance    Level of [place] (Supported Standing, Static Balance)  standby assist  -SJ     Row Name 03/18/19 1315          Pain Assessment    Additional Documentation  Pain Scale: Word Pre/Post-Treatment (Group)  -SJ     Row Name 03/18/19 1315          Pain Scale: Word Pre/Post-Treatment    Pain: Word Scale, Pretreatment  0 - no pain  -SJ     Pain: Word Scale, Post-Treatment  0 - no pain  -SJ     Row Name             Wound 03/14/19 1040 Bilateral anterior;lower abdomen incision    Wound - Properties Group Date first assessed: 03/14/19  -KL Time first assessed: 1040  -KL Present On Admission : no  -KL Side: Bilateral  -KL Orientation: anterior;lower  -KL Location: abdomen  -KL Type: incision  -KL    Row Name 03/18/19 1315          Coping    Observed Emotional State  accepting;calm;cooperative  -SJ     Verbalized Emotional State  acceptance  -SJ     Row Name 03/18/19 1315          Plan of Care Review    Plan of Care Reviewed With  patient  -SJ     Row Name 03/18/19 1315          Physical Therapy Clinical Impression    Date of Referral to PT  03/17/19  -SJ     PT Diagnosis (PT Clinical Impression)  impaired mobility  -SJ     Patient/Family Goals Statement (PT Clinical Impression)  decrease swelling  -SJ     Criteria for Skilled Interventions Met (PT Clinical Impression)  yes;treatment indicated  -SJ     Pathology/Pathophysiology Noted (Describe Specifically for Each System)  cardiovascular;musculoskeletal  -SJ     Impairments Found (describe specific impairments)  aerobic  capacity/endurance;gait, locomotion, and balance  -SJ     Functional Limitations in Following Categories (Describe Specific Limitations)  self-care;home management;community/leisure  -SJ     Rehab Potential (PT Clinical Summary)  good, to achieve stated therapy goals  -SJ     Predicted Duration of Therapy (PT)  1wk  -SJ     Care Plan Review (PT)  evaluation/treatment results reviewed;care plan/treatment goals reviewed;risks/benefits reviewed;current/potential barriers reviewed;patient/other agree to care plan  -     Row Name 03/18/19 1315          Physical Therapy Goals    Bed Mobility Goal Selection (PT)  bed mobility, PT goal 1  -SJ     Transfer Goal Selection (PT)  transfer, PT goal 1  -SJ     Gait Training Goal Selection (PT)  gait training, PT goal 1  -     Row Name 03/18/19 1315          Bed Mobility Goal 1 (PT)    Activity/Assistive Device (Bed Mobility Goal 1, PT)  sit to supine/supine to sit  -SJ     Stevens Level/Cues Needed (Bed Mobility Goal 1, PT)  conditional independence  -SJ     Time Frame (Bed Mobility Goal 1, PT)  1 week;long term goal (LTG)  -     Row Name 03/18/19 1315          Transfer Goal 1 (PT)    Activity/Assistive Device (Transfer Goal 1, PT)  sit-to-stand/stand-to-sit;bed-to-chair/chair-to-bed  -SJ     Stevens Level/Cues Needed (Transfer Goal 1, PT)  conditional independence  -SJ     Time Frame (Transfer Goal 1, PT)  1 week;long term goal (LTG)  -     Row Name 03/18/19 1315          Gait Training Goal 1 (PT)    Activity/Assistive Device (Gait Training Goal 1, PT)  walker, rolling  -SJ     Stevens Level (Gait Training Goal 1, PT)  conditional independence  -SJ     Distance (Gait Goal 1, PT)  200  -SJ     Time Frame (Gait Training Goal 1, PT)  2 weeks;long term goal (LTG)  -     Row Name 03/18/19 1315          Positioning and Restraints    Pre-Treatment Position  in bed  -SJ     Post Treatment Position  bed  -SJ     In Bed  notified nsg;fowlers;call light within  reach;encouraged to call for assist;heels elevated  -       User Key  (r) = Recorded By, (t) = Taken By, (c) = Cosigned By    Initials Name Provider Type     Porsha Nelson, MONSTER Physical Therapist    Santa Shin, RN Registered Nurse        Physical Therapy Education     Title: PT OT SLP Therapies (In Progress)     Topic: Physical Therapy (In Progress)     Point: Mobility training (In Progress)     Learning Progress Summary           Patient Acceptance, E,D, NR by  at 3/18/2019  2:13 PM                   Point: Body mechanics (In Progress)     Learning Progress Summary           Patient Acceptance, E,D, NR by  at 3/18/2019  2:13 PM                               User Key     Initials Effective Dates Name Provider Type Discipline     06/19/15 -  Porsha Nelson, PT Physical Therapist PT              PT Recommendation and Plan  Anticipated Discharge Disposition (PT): home with assist  Planned Therapy Interventions (PT Eval): balance training, bed mobility training, gait training, home exercise program, strengthening, patient/family education, transfer training  Therapy Frequency (PT Clinical Impression): daily  Outcome Summary/Treatment Plan (PT)  Anticipated Equipment Needs at Discharge (PT): four wheeled walker  Anticipated Discharge Disposition (PT): home with assist  Plan of Care Reviewed With: patient  Outcome Summary: PT eval completed. Pt presents with BLE swelling impacting mobility independence. Pt req Peter for supine <> sit, and ambulated 100ft with RW with CGA. Pt given adaptive equipment of sock aid, leg , and long handled sponge and educated in use. Pt will benefit from IPPT services to address mobility. PT recommends bariatric rolling walker to use at home at d/c.   Outcome Measures     Row Name 03/18/19 3586             How much help from another person do you currently need...    Turning from your back to your side while in flat bed without using bedrails?  4  -SJ      Moving  from lying on back to sitting on the side of a flat bed without bedrails?  4  -SJ      Moving to and from a bed to a chair (including a wheelchair)?  4  -SJ      Standing up from a chair using your arms (e.g., wheelchair, bedside chair)?  4  -SJ      Climbing 3-5 steps with a railing?  3  -SJ      To walk in hospital room?  3  -SJ      AM-PAC 6 Clicks Score  22  -         Functional Assessment    Outcome Measure Options  AM-PAC 6 Clicks Basic Mobility (PT)  -        User Key  (r) = Recorded By, (t) = Taken By, (c) = Cosigned By    Initials Name Provider Type    Porsha Mckeon PT Physical Therapist         Time Calculation:   PT Charges     Row Name 03/18/19 1414             Time Calculation    Start Time  1315  -SJ      PT Received On  03/18/19  -      PT Goal Re-Cert Due Date  03/28/19  -        User Key  (r) = Recorded By, (t) = Taken By, (c) = Cosigned By    Initials Name Provider Type    Porsha Mckeon PT Physical Therapist        Therapy Suggested Charges     Code   Minutes Charges    None           Therapy Charges for Today     Code Description Service Date Service Provider Modifiers Qty    63488015636 HC PT EVAL MOD COMPLEXITY 4 3/18/2019 Porsha Nelson, PT GP 1          PT G-Codes  Outcome Measure Options: AM-PAC 6 Clicks Basic Mobility (PT)  AM-PAC 6 Clicks Score: 22      Porsha Nelson PT  3/18/2019

## 2019-03-18 NOTE — PROGRESS NOTES
[facility] at [facility]    Inpatient Progress Note      Chief Complaint/Reason for service:    · Volume overload, elevated troponin, abnormal echocardiogram         Subjective:       Patient resting in bed. Denies chest pain or shortness of breath. Continued significant lower extremity edema. Has a cough, makes sleeping difficult    Past medical, surgical, social and family history reviewed in the patient's electronic medical record.    Review of Systems:   Positive for lower extremity swelling  Negative for exertional chest pain, dyspnea with exertion, palpitations     Problem List  Active Hospital Problems    Diagnosis  POA   • Gestational hypertension [O13.9]  Yes      Resolved Hospital Problems   No resolved problems to display.            Objective:      Current Facility-Administered Medications:   •  acetaminophen (TYLENOL) tablet 650 mg, 650 mg, Oral, Q4H PRN, Monse Puri CRNA  •  albuterol (PROVENTIL) nebulizer solution 0.083% 2.5 mg/3mL, 2.5 mg, Nebulization, Q6H PRN, Dannie Becerra DO, 2.5 mg at 03/17/19 1829  •  bisacodyl (DULCOLAX) EC tablet 10 mg, 10 mg, Oral, Daily PRN, Ramses Pardo III, MD  •  butorphanol (STADOL) injection 1 mg, 1 mg, Intravenous, Q2H PRN, Eliseo Morillo MD, 1 mg at 03/14/19 0557  •  diphenhydrAMINE (BENADRYL) capsule 25 mg, 25 mg, Oral, Q4H PRN **OR** diphenhydrAMINE (BENADRYL) injection 25 mg, 25 mg, Intravenous, Q4H PRN **OR** diphenhydrAMINE (BENADRYL) injection 25 mg, 25 mg, Intramuscular, Q4H PRN, Monse Puri CRNA  •  docusate sodium (COLACE) capsule 100 mg, 100 mg, Oral, BID PRN, Ramses Pardo III, MD  •  furosemide (LASIX) tablet 40 mg, 40 mg, Oral, Daily, Eileen Wisdom, APRN  •  guaiFENesin (ROBITUSSIN) 100 MG/5ML oral solution 200 mg, 200 mg, Oral, Q4H PRN, Arsen Barba MD, 200 mg at 03/18/19 0029  •  heparin (porcine) 5000 UNIT/ML injection 5,000 Units, 5,000 Units, Subcutaneous, Q8H, Dannie Becerra, , 5,000 Units at  03/18/19 0537  •  HYDROcodone-acetaminophen (NORCO) 5-325 MG per tablet 1 tablet, 1 tablet, Oral, Q6H PRN, Dannie Becerra DO, 1 tablet at 03/17/19 0921  •  Influenza Vac Subunit Quad (FLUCELVAX) injection 0.5 mL, 0.5 mL, Intramuscular, During Hospitalization, Ramses Pardo III, MD  •  labetalol (NORMODYNE) tablet 200 mg, 200 mg, Oral, Q8H, Ramses Pardo III, MD, 200 mg at 03/17/19 2344  •  lisinopril (PRINIVIL,ZESTRIL) tablet 10 mg, 10 mg, Oral, Q24H, Ramses Pardo III, MD, 10 mg at 03/17/19 0853  •  [DISCONTINUED] ondansetron (ZOFRAN) tablet 4 mg, 4 mg, Oral, Q6H PRN **OR** ondansetron (ZOFRAN) injection 4 mg, 4 mg, Intravenous, Q6H PRN, Ramses Pardo III, MD, 4 mg at 03/15/19 0000  •  ondansetron (ZOFRAN) tablet 4 mg, 4 mg, Oral, Q8H PRN, Ramses Pardo III, MD  •  potassium chloride (MICRO-K) CR capsule 20 mEq, 20 mEq, Oral, Daily, Dannie Becerra DO, 20 mEq at 03/17/19 1600  •  promethazine (PHENERGAN) injection 12.5 mg, 12.5 mg, Intravenous, Q4H PRN, HighArsen MD, 12.5 mg at 03/14/19 2102    Vital Sign Min/Max for last 24 hours  Temp  Min: 97.7 °F (36.5 °C)  Max: 98.4 °F (36.9 °C)   BP  Min: 117/58  Max: 143/70   Pulse  Min: 71  Max: 75   Resp  Min: 16  Max: 20   SpO2  Min: 100 %  Max: 100 %   No Data Recorded      Intake/Output Summary (Last 24 hours) at 3/18/2019 0831  Last data filed at 3/18/2019 0410  Gross per 24 hour   Intake 940 ml   Output 2145 ml   Net -1205 ml           CONSTITUTIONAL: No acute distress, normal affect  RESPIRATORY: Normal effort. Clear to auscultation bilaterally without wheezing or rales  CARDIOVASCULAR: Regular rate and rhythm with normal S1 and S2. Without murmur, gallop or rub.  PERIPHERAL VASCULAR: Normal radial pulses bilaterally. There is significant peripheral edema bilaterally through the hip level.    Results Review:   Lab Results   Component Value Date    TROPONINI 0.065 (H) 03/18/2019       BUN   Date Value Ref Range Status   03/18/2019 13  9 - 23 mg/dL Final     Creatinine   Date Value Ref Range Status   03/18/2019 0.60 0.60 - 1.30 mg/dL Final     Potassium   Date Value Ref Range Status   03/18/2019 4.3 3.5 - 5.5 mmol/L Final     ALT (SGPT)   Date Value Ref Range Status   03/18/2019 9 7 - 40 U/L Final     AST (SGOT)   Date Value Ref Range Status   03/18/2019 12 0 - 33 U/L Final       No results found for: CHOL  No results found for: TRIG  No results found for: HDL  No results found for: LDLC  No results found for: LDL  No components found for: LDLDIRECTC        Tele:  NSR       TTE 3/2019  · Estimated EF = 45-50%  · LV apical hypokinesia.  · Left ventricular wall thickness is consistent with mild concentric hypertrophy.  · Mild mitral valve regurgitation is present            Assessment/Plan:     ASSESSMENT:  -Chest pressure, dyspnea, volume overload during pregnancy. Acute systolic HF symptoms. Improving with diuresis.  Echocardiogram with EF 45- 50% with LV apical hypokinesia and mild MR. EKG with PRWP. Troponin 0.059 this AM. Risk factors for CAD include tobacco abuse and familial history. Findings concerning for peripartum cardiomyopathy vs spontaneous coronary artery dissection (SCAD) vs traditional ACS/NSTEMI. The clinical presentation and nature of the wall motion abnormality on echo seems most consistent with SCAD at this time.   -35 weeks pregnant, now s/p C section  -Obesity  -Hypertension     PLAN:  -HF: Continue beta blocker. Lasix 40mg PO given once today already. Please give an additional Lasix 20mg IVP once this AM. Change to Lasix to 40mg IVP daily for now starting again tomorrow. Possible ACE-I cough. Uncertain use of losartan in breastfeeding. Will hold off both ACEi and ARBs for now.    -Elevated troponin, wall motion abnormality on TTE concerning for ACS: Recommend ischemic evaluation with a left heart cath tomorrow, NPO p midnight.  Continue beta blocker.  Depending on heart catheterization results, will decide on aspirin and/or  Plavix.  We'll need to consider whether not the patient intends to breastfeed.    Scribed for Dr. Spence by LISSETH Larsen. 3/18/2019  8:33 AM      Electronically signed by LISSETH Larsen, 03/18/19, 8:33 AM.    I, Eb Spence MD, personally performed the services as scribed by the above named individual. I have made any necessary edits and it is both accurate and complete.     Eb Spence MD, MSc, [facility]  Interventional Cardiology  [facility] at [facility]

## 2019-03-19 VITALS
HEART RATE: 85 BPM | HEIGHT: 67 IN | SYSTOLIC BLOOD PRESSURE: 127 MMHG | BODY MASS INDEX: 45.99 KG/M2 | OXYGEN SATURATION: 97 % | WEIGHT: 293 LBS | RESPIRATION RATE: 16 BRPM | DIASTOLIC BLOOD PRESSURE: 57 MMHG | TEMPERATURE: 98.5 F

## 2019-03-19 PROCEDURE — 25010000002 HEPARIN (PORCINE) PER 1000 UNITS: Performed by: INTERNAL MEDICINE

## 2019-03-19 PROCEDURE — 93458 L HRT ARTERY/VENTRICLE ANGIO: CPT | Performed by: INTERNAL MEDICINE

## 2019-03-19 PROCEDURE — C1769 GUIDE WIRE: HCPCS | Performed by: INTERNAL MEDICINE

## 2019-03-19 PROCEDURE — B2151ZZ FLUOROSCOPY OF LEFT HEART USING LOW OSMOLAR CONTRAST: ICD-10-PCS | Performed by: INTERNAL MEDICINE

## 2019-03-19 PROCEDURE — 4A023N7 MEASUREMENT OF CARDIAC SAMPLING AND PRESSURE, LEFT HEART, PERCUTANEOUS APPROACH: ICD-10-PCS | Performed by: INTERNAL MEDICINE

## 2019-03-19 PROCEDURE — C1894 INTRO/SHEATH, NON-LASER: HCPCS | Performed by: INTERNAL MEDICINE

## 2019-03-19 PROCEDURE — 25010000002 MIDAZOLAM PER 1 MG: Performed by: INTERNAL MEDICINE

## 2019-03-19 PROCEDURE — B2111ZZ FLUOROSCOPY OF MULTIPLE CORONARY ARTERIES USING LOW OSMOLAR CONTRAST: ICD-10-PCS | Performed by: INTERNAL MEDICINE

## 2019-03-19 PROCEDURE — 25010000002 FUROSEMIDE PER 20 MG: Performed by: INTERNAL MEDICINE

## 2019-03-19 PROCEDURE — 0 IOPAMIDOL PER 1 ML: Performed by: INTERNAL MEDICINE

## 2019-03-19 PROCEDURE — 99024 POSTOP FOLLOW-UP VISIT: CPT | Performed by: INTERNAL MEDICINE

## 2019-03-19 PROCEDURE — 25010000002 FENTANYL CITRATE (PF) 100 MCG/2ML SOLUTION: Performed by: INTERNAL MEDICINE

## 2019-03-19 RX ORDER — SODIUM CHLORIDE 0.9 % (FLUSH) 0.9 %
3-10 SYRINGE (ML) INJECTION AS NEEDED
Status: DISCONTINUED | OUTPATIENT
Start: 2019-03-19 | End: 2019-03-19 | Stop reason: HOSPADM

## 2019-03-19 RX ORDER — ONDANSETRON 4 MG/1
4 TABLET, FILM COATED ORAL EVERY 8 HOURS PRN
Status: DISCONTINUED | OUTPATIENT
Start: 2019-03-19 | End: 2019-03-19 | Stop reason: HOSPADM

## 2019-03-19 RX ORDER — SODIUM CHLORIDE 0.9 % (FLUSH) 0.9 %
3 SYRINGE (ML) INJECTION EVERY 12 HOURS SCHEDULED
Status: DISCONTINUED | OUTPATIENT
Start: 2019-03-19 | End: 2019-03-19 | Stop reason: HOSPADM

## 2019-03-19 RX ORDER — PROMETHAZINE HYDROCHLORIDE 25 MG/1
25 TABLET ORAL EVERY 6 HOURS PRN
Status: DISCONTINUED | OUTPATIENT
Start: 2019-03-19 | End: 2019-03-19 | Stop reason: HOSPADM

## 2019-03-19 RX ORDER — OXYCODONE AND ACETAMINOPHEN 7.5; 325 MG/1; MG/1
1 TABLET ORAL EVERY 4 HOURS PRN
Status: DISCONTINUED | OUTPATIENT
Start: 2019-03-19 | End: 2019-03-19 | Stop reason: HOSPADM

## 2019-03-19 RX ORDER — PROMETHAZINE HYDROCHLORIDE 12.5 MG/1
12.5 SUPPOSITORY RECTAL EVERY 6 HOURS PRN
Status: DISCONTINUED | OUTPATIENT
Start: 2019-03-19 | End: 2019-03-19 | Stop reason: HOSPADM

## 2019-03-19 RX ORDER — HYDROMORPHONE HYDROCHLORIDE 1 MG/ML
0.5 INJECTION, SOLUTION INTRAMUSCULAR; INTRAVENOUS; SUBCUTANEOUS
Status: DISCONTINUED | OUTPATIENT
Start: 2019-03-19 | End: 2019-03-19 | Stop reason: HOSPADM

## 2019-03-19 RX ORDER — FENTANYL CITRATE 50 UG/ML
INJECTION, SOLUTION INTRAMUSCULAR; INTRAVENOUS AS NEEDED
Status: DISCONTINUED | OUTPATIENT
Start: 2019-03-19 | End: 2019-03-19 | Stop reason: HOSPADM

## 2019-03-19 RX ORDER — LANOLIN 100 %
OINTMENT (GRAM) TOPICAL
Status: DISCONTINUED | OUTPATIENT
Start: 2019-03-19 | End: 2019-03-19 | Stop reason: HOSPADM

## 2019-03-19 RX ORDER — FUROSEMIDE 40 MG/1
40 TABLET ORAL DAILY
Qty: 30 TABLET | Refills: 5 | Status: SHIPPED | OUTPATIENT
Start: 2019-03-19 | End: 2019-10-09

## 2019-03-19 RX ORDER — LIDOCAINE HYDROCHLORIDE 10 MG/ML
INJECTION, SOLUTION EPIDURAL; INFILTRATION; INTRACAUDAL; PERINEURAL AS NEEDED
Status: DISCONTINUED | OUTPATIENT
Start: 2019-03-19 | End: 2019-03-19 | Stop reason: HOSPADM

## 2019-03-19 RX ORDER — OXYCODONE AND ACETAMINOPHEN 10; 325 MG/1; MG/1
1 TABLET ORAL EVERY 4 HOURS PRN
Status: DISCONTINUED | OUTPATIENT
Start: 2019-03-19 | End: 2019-03-19 | Stop reason: HOSPADM

## 2019-03-19 RX ORDER — BENZONATATE 100 MG/1
200 CAPSULE ORAL 3 TIMES DAILY PRN
Status: DISCONTINUED | OUTPATIENT
Start: 2019-03-19 | End: 2019-03-19 | Stop reason: HOSPADM

## 2019-03-19 RX ORDER — NALOXONE HCL 0.4 MG/ML
0.1 VIAL (ML) INJECTION
Status: DISCONTINUED | OUTPATIENT
Start: 2019-03-19 | End: 2019-03-19 | Stop reason: HOSPADM

## 2019-03-19 RX ORDER — MIDAZOLAM HYDROCHLORIDE 1 MG/ML
INJECTION INTRAMUSCULAR; INTRAVENOUS AS NEEDED
Status: DISCONTINUED | OUTPATIENT
Start: 2019-03-19 | End: 2019-03-19 | Stop reason: HOSPADM

## 2019-03-19 RX ORDER — LABETALOL 200 MG/1
200 TABLET, FILM COATED ORAL EVERY 8 HOURS SCHEDULED
Qty: 90 TABLET | Refills: 1 | Status: SHIPPED | OUTPATIENT
Start: 2019-03-19 | End: 2019-04-01 | Stop reason: SDUPTHER

## 2019-03-19 RX ORDER — POTASSIUM CHLORIDE 750 MG/1
10 CAPSULE, EXTENDED RELEASE ORAL DAILY
Qty: 30 CAPSULE | Refills: 5 | Status: SHIPPED | OUTPATIENT
Start: 2019-03-19 | End: 2019-04-03

## 2019-03-19 RX ORDER — PROMETHAZINE HYDROCHLORIDE 25 MG/ML
12.5 INJECTION, SOLUTION INTRAMUSCULAR; INTRAVENOUS EVERY 6 HOURS PRN
Status: DISCONTINUED | OUTPATIENT
Start: 2019-03-19 | End: 2019-03-19 | Stop reason: HOSPADM

## 2019-03-19 RX ADMIN — BENZONATATE 200 MG: 100 CAPSULE ORAL at 01:49

## 2019-03-19 RX ADMIN — POTASSIUM CHLORIDE 20 MEQ: 750 CAPSULE, EXTENDED RELEASE ORAL at 10:17

## 2019-03-19 RX ADMIN — LABETALOL HYDROCHLORIDE 200 MG: 200 TABLET, FILM COATED ORAL at 00:19

## 2019-03-19 RX ADMIN — FUROSEMIDE 40 MG: 10 INJECTION, SOLUTION INTRAMUSCULAR; INTRAVENOUS at 10:16

## 2019-03-19 RX ADMIN — LABETALOL HYDROCHLORIDE 200 MG: 200 TABLET, FILM COATED ORAL at 10:17

## 2019-03-19 RX ADMIN — GUAIFENESIN 200 MG: 200 SOLUTION ORAL at 00:20

## 2019-03-19 RX ADMIN — SODIUM CHLORIDE, PRESERVATIVE FREE 3 ML: 5 INJECTION INTRAVENOUS at 10:16

## 2019-03-19 NOTE — PAYOR COMM NOTE
"Rex Farias (43 y.o. Female)     Auth#[id number]    From: Yas Villalta  #[phone]  Fax#[phone]      Date of Birth Social Security Number Address Home Phone MRN    1975  [address] [place] [address] [phone] [id number]    Church Marital Status          Shinto Single       Admission Date Admission Type Admitting Provider Attending Provider Department, Room/Bed    3/13/19 Elective Ramses Pardo III, MD  [facility],     Discharge Date Discharge Disposition Discharge Destination        3/19/2019 Home or Self Care              Attending Provider:  (none)   Allergies:  No Known Allergies    Isolation:  None   Infection:  None   Code Status:  Prior    Ht:  170.2 cm (67\")   Wt:  134 kg (294 lb 12.8 oz)    Admission Cmt:  None   Principal Problem:  None                Active Insurance as of 3/13/2019     Primary Coverage     Payor Plan Insurance Group Employer/Plan Group    [employer] MEDICAID [employer] MEDICAID [id number]     Payor Plan Address Payor Plan Phone Number Payor Plan Fax Number Effective Dates    [address] [address] [phone]  2019 - None Entered    [facility] [id number]       Subscriber Name Subscriber Birth Date Member ID       REX FARIAS 1975 [id number]                 Emergency Contacts      (Rel.) Home Phone Work Phone Mobile Phone    Cortney Knight (Mother) [phone] -- [phone]    NELSY GARCIA (Significant Other) [phone] -- --               Discharge Summary      Ramses Pardo III, MD at 3/19/2019  9:21 AM          Discharge Summary    Patient Name: Rex Farias  : 1975  MRN: [id number]  Date of Service: 3/19/2019  Referring Provider: Arash Jeffries  Discharge Provider: Rasmes Mart \"[place]al[place]\" FELA Pardo MD  Consulting physicians: Ramakrishna Webber MD and Doug Milligan MD -- maternal fetal medicine   Eb Spence MD--interventional cardiology    Date of Admission: 3/13/2019    Date " of Discharge:  3/19/2019         Admission Diagnosis: Gestational hypertension [O13.9]   Severe preeclampsia/HELLP syndrome   Volume overload with elevated troponin and abnormal ECG     Discharge Diagnosis: primary  section, low transverse incision at 35w0d    Procedures:  , Low Transverse     3/14/2019    10:41 AM    Left heart catheterization     Hospital Course:  Patient is a 43 y.o. female  status post primary  section, low transverse incision without complication at 35w0d. Postpartum course was complicated by development of HELLP syndrome.  She was maintained on magnesium sulfate postpartum until her platelet count stabilized and blood pressures were stabilized.  She was seen by cardiology on admission based on her marked edema.  Chest x-ray showed no evidence of pulmonary edema.  Echocardiogram was obtained to evaluate for possible heart failure.  She was noted to have an ejection fraction of 40-45%.  However her echocardiogram did suggest an apical wall motion abnormality which cardiology felt was suspicious for possible previous LAD dissection.  Decision was made to proceed with eventual cardiac catheterization prior to discharge.  She remained afebrile, with vital signs stable subsequent to her  section.     Left heart catheterization was performed on the morning of .  A dissection of the distal LAD could not definitively be confirmed by Dr. Spence at time of catheterization.  However, he stated that he still strongly suspected that this was the etiology of her apical wall motion abnormality.  She will be continued on labetalol and Lasix.  She will be seen back at the heart and valve clinic in 1 week.  She was ready for discharge on postpartum day 6.     Infant:   female  fetus 1980 g (4 lb 5.8 oz)  with Apgar scores of 7  , 8   at five minutes.    Discharge Condition: Stable    Discharge to: Home    Discharge Medications:      Discharge Medications      New  "Medications      Instructions Start Date   furosemide 40 MG tablet  Commonly known as:  LASIX   40 mg, Oral, Daily      labetalol 200 MG tablet  Commonly known as:  NORMODYNE   200 mg, Oral, Every 8 Hours Scheduled      potassium chloride 10 MEQ CR capsule  Commonly known as:  MICRO-K   10 mEq, Oral, Daily         Continue These Medications      Instructions Start Date   acetaminophen 325 MG tablet  Commonly known as:  TYLENOL   650 mg, Oral, Every 6 Hours PRN      ferrous sulfate 325 (65 FE) MG tablet   325 mg, Oral, 2 Times Daily Before Meals      pantoprazole 40 MG EC tablet  Commonly known as:  PROTONIX   40 mg, Oral, Daily      prenatal vitamin 27-0.8 27-0.8 MG tablet tablet   1 tablet, Oral, Daily         Stop These Medications    methylPREDNISolone 4 MG tablet  Commonly known as:  MEDROL (JUNE)     ZYRTEC-D PO            Discharge Diet: Regular diet    Discharge Activity: No driving until no longer taking narcotics, Pelvic rest x 6 weeks (nothing in the vagina, no intercourse, tampons, douches) , No lifting >5 lbs for 6 weeks and No strenuous activity     Follow up appointments:  Diagnostic Center in [place] in 1 week.  An appointment will also be made at the [facility] Clinic in 1 week.    Contraception: Patient is instructed to remain at pelvic rest until after her 6-week postpartum visit.  Contraceptive options will be reviewed with her at that visit.  She will keep her postpartum visit with Dr. Monse Wayne in 6 weeks.  Ramses Mart \"[place]\" FELA Pardo MD  3/19/2019 9:58 AM    Electronically signed by Ramses Pardo III, MD at 3/19/2019  9:58 AM       "

## 2019-03-19 NOTE — DISCHARGE SUMMARY
"Discharge Summary    Patient Name: Tika Pugh  : 1975  MRN: [id number]  Date of Service: 3/19/2019  Referring Provider: Arash Jeffries  Discharge Provider: Ramses Mart \"[place]\" FELA Pardo MD  Consulting physicians: Ramakrishna Webber MD and Doug Milligan MD -- maternal fetal medicine   Eb Spence MD--interventional cardiology    Date of Admission: 3/13/2019    Date of Discharge:  3/19/2019         Admission Diagnosis: Gestational hypertension [O13.9]   Severe preeclampsia/HELLP syndrome   Volume overload with elevated troponin and abnormal ECG     Discharge Diagnosis: primary  section, low transverse incision at 35w0d    Procedures:  , Low Transverse     3/14/2019    10:41 AM    Left heart catheterization     Hospital Course:  Patient is a 43 y.o. female  status post primary  section, low transverse incision without complication at 35w0d. Postpartum course was complicated by development of HELLP syndrome.  She was maintained on magnesium sulfate postpartum until her platelet count stabilized and blood pressures were stabilized.  She was seen by cardiology on admission based on her marked edema.  Chest x-ray showed no evidence of pulmonary edema.  Echocardiogram was obtained to evaluate for possible heart failure.  She was noted to have an ejection fraction of 40-45%.  However her echocardiogram did suggest an apical wall motion abnormality which cardiology felt was suspicious for possible previous LAD dissection.  Decision was made to proceed with eventual cardiac catheterization prior to discharge.  She remained afebrile, with vital signs stable subsequent to her  section.     Left heart catheterization was performed on the morning of .  A dissection of the distal LAD could not definitively be confirmed by Dr. Spence at time of catheterization.  However, he stated that he still strongly suspected that this was the etiology of her apical wall motion " "abnormality.  She will be continued on labetalol and Lasix.  She will be seen back at the heart and valve clinic in 1 week.  She was ready for discharge on postpartum day 6.     Infant:   female  fetus 1980 g (4 lb 5.8 oz)  with Apgar scores of 7  , 8   at five minutes.    Discharge Condition: Stable    Discharge to: Home    Discharge Medications:      Discharge Medications      New Medications      Instructions Start Date   furosemide 40 MG tablet  Commonly known as:  LASIX   40 mg, Oral, Daily      labetalol 200 MG tablet  Commonly known as:  NORMODYNE   200 mg, Oral, Every 8 Hours Scheduled      potassium chloride 10 MEQ CR capsule  Commonly known as:  MICRO-K   10 mEq, Oral, Daily         Continue These Medications      Instructions Start Date   acetaminophen 325 MG tablet  Commonly known as:  TYLENOL   650 mg, Oral, Every 6 Hours PRN      ferrous sulfate 325 (65 FE) MG tablet   325 mg, Oral, 2 Times Daily Before Meals      pantoprazole 40 MG EC tablet  Commonly known as:  PROTONIX   40 mg, Oral, Daily      prenatal vitamin 27-0.8 27-0.8 MG tablet tablet   1 tablet, Oral, Daily         Stop These Medications    methylPREDNISolone 4 MG tablet  Commonly known as:  MEDROL (JUNE)     ZYRTEC-D PO            Discharge Diet: Regular diet    Discharge Activity: No driving until no longer taking narcotics, Pelvic rest x 6 weeks (nothing in the vagina, no intercourse, tampons, douches) , No lifting >5 lbs for 6 weeks and No strenuous activity     Follow up appointments:  Diagnostic Center in [place] in 1 week.  An appointment will also be made at the [facility] Clinic in 1 week.    Contraception: Patient is instructed to remain at pelvic rest until after her 6-week postpartum visit.  Contraceptive options will be reviewed with her at that visit.  She will keep her postpartum visit with Dr. Monse Wayne in 6 weeks.  Ramses Mart \"Shal[place]\" FELA Pardo MD  3/19/2019 9:58 AM  "

## 2019-03-19 NOTE — PROGRESS NOTES
[facility] at [facility]    Inpatient Progress Note      Chief Complaint/Reason for service:    · Volume overload, elevated troponin, abnormal echocardiogram         Subjective:       Still with cough. Breathing comfortable at rest. No chest pain. Swelling still significant    Past medical, surgical, social and family history reviewed in the patient's electronic medical record.    Review of Systems:   Positive for lower extremity swelling, cough  Negative for exertional chest pain, dyspnea with exertion, palpitations     Problem List  Active Hospital Problems    Diagnosis  POA   • Gestational hypertension [O13.9]  Yes      Resolved Hospital Problems   No resolved problems to display.            Objective:      Current Facility-Administered Medications:   •  acetaminophen (TYLENOL) tablet 650 mg, 650 mg, Oral, Q4H PRN, Monse Puri CRNA  •  albuterol (PROVENTIL) nebulizer solution 0.083% 2.5 mg/3mL, 2.5 mg, Nebulization, Q6H PRN, Dannie Becerra DO, 2.5 mg at 03/17/19 1829  •  benzonatate (TESSALON) capsule 200 mg, 200 mg, Oral, TID PRN, Eliseo Morillo MD, 200 mg at 03/19/19 0149  •  bisacodyl (DULCOLAX) EC tablet 10 mg, 10 mg, Oral, Daily PRN, Ramses Pardo III, MD  •  butorphanol (STADOL) injection 1 mg, 1 mg, Intravenous, Q2H PRN, Eliseo Morillo MD, 1 mg at 03/14/19 0557  •  diphenhydrAMINE (BENADRYL) capsule 25 mg, 25 mg, Oral, Q4H PRN **OR** diphenhydrAMINE (BENADRYL) injection 25 mg, 25 mg, Intravenous, Q4H PRN **OR** diphenhydrAMINE (BENADRYL) injection 25 mg, 25 mg, Intramuscular, Q4H PRN, Monse Puri CRNA  •  docusate sodium (COLACE) capsule 100 mg, 100 mg, Oral, BID PRN, Ramses Pardo III, MD  •  fentaNYL citrate (PF) (SUBLIMAZE) injection, , , PRN, Eb Spence MD, 50 mcg at 03/19/19 0834  •  furosemide (LASIX) injection 40 mg, 40 mg, Intravenous, Daily, Eb Spence MD  •  guaiFENesin (ROBITUSSIN) 100 MG/5ML oral solution 200 mg, 200 mg, Oral, Q4H PRN, High,  Arsen YOUNG MD, 200 mg at 03/19/19 0020  •  heparin (porcine) 5000 UNIT/ML injection 5,000 Units, 5,000 Units, Subcutaneous, Q8H, Dannie Becerra DO, 5,000 Units at 03/18/19 2110  •  HYDROcodone-acetaminophen (NORCO) 5-325 MG per tablet 1 tablet, 1 tablet, Oral, Q6H PRN, Dannie Becerra DO, 1 tablet at 03/17/19 0921  •  Influenza Vac Subunit Quad (FLUCELVAX) injection 0.5 mL, 0.5 mL, Intramuscular, During Hospitalization, Ramses Pardo III, MD  •  labetalol (NORMODYNE) tablet 200 mg, 200 mg, Oral, Q8H, Ramses Pardo III, MD, 200 mg at 03/19/19 0019  •  lidocaine PF 1% (XYLOCAINE) injection, , , PRN, Eb Spence MD, 5 mL at 03/19/19 0829  •  midazolam (VERSED) injection, , , PRN, Eb Spence MD, 2 mg at 03/19/19 0829  •  midazolam (VERSED) injection, , , PRN, Eb Spence MD, 2 mg at 03/19/19 0834  •  nicardipine (CARDENE) 100 MCG/ mcg, nitroglycerin 400 mcg, heparin (porcine) 5,000 Units radial artery injection, , , PRN, Eb Spence MD  •  O2 (OXYGEN), , , PRN, Eb Spence MD, 2 L at 03/19/19 0837  •  [DISCONTINUED] ondansetron (ZOFRAN) tablet 4 mg, 4 mg, Oral, Q6H PRN **OR** ondansetron (ZOFRAN) injection 4 mg, 4 mg, Intravenous, Q6H PRN, Ramses Pardo III, MD, 4 mg at 03/15/19 0000  •  ondansetron (ZOFRAN) tablet 4 mg, 4 mg, Oral, Q8H PRN, Ramses Pardo III, MD  •  potassium chloride (MICRO-K) CR capsule 20 mEq, 20 mEq, Oral, Daily, Dannie Becerra DO, 20 mEq at 03/18/19 0847  •  promethazine (PHENERGAN) injection 12.5 mg, 12.5 mg, Intravenous, Q4H PRN, High, Arsen YOUNG MD, 12.5 mg at 03/14/19 2102    Vital Sign Min/Max for last 24 hours  Temp  Min: 98.3 °F (36.8 °C)  Max: 98.6 °F (37 °C)   BP  Min: 109/57  Max: 151/70   Pulse  Min: 63  Max: 91   Resp  Min: 14  Max: 18   SpO2  Min: 96 %  Max: 97 %   No Data Recorded      Intake/Output Summary (Last 24 hours) at 3/19/2019 0849  Last data filed at 3/19/2019 0559  Gross per 24 hour   Intake --   Output 3700 ml   Net -3700  ml           CONSTITUTIONAL: No acute distress, normal affect  RESPIRATORY: Normal effort. Clear to auscultation bilaterally without wheezing or rales  CARDIOVASCULAR: Regular rate and rhythm with normal S1 and S2. Without murmur, gallop or rub.  PERIPHERAL VASCULAR: Normal radial pulses bilaterally. There is significant peripheral edema bilaterally through the hip level.    Results Review:   Lab Results   Component Value Date    TROPONINI 0.065 (H) 03/18/2019       BUN   Date Value Ref Range Status   03/18/2019 13 9 - 23 mg/dL Final     Creatinine   Date Value Ref Range Status   03/18/2019 0.60 0.60 - 1.30 mg/dL Final     Potassium   Date Value Ref Range Status   03/18/2019 4.3 3.5 - 5.5 mmol/L Final     ALT (SGPT)   Date Value Ref Range Status   03/18/2019 9 7 - 40 U/L Final     AST (SGOT)   Date Value Ref Range Status   03/18/2019 12 0 - 33 U/L Final       No results found for: CHOL  No results found for: TRIG  No results found for: HDL  No results found for: LDLC  No results found for: LDL  No components found for: LDLDIRECTC        Tele:  NSR       TTE 3/2019  · Estimated EF = 45-50%  · LV apical hypokinesia.  · Left ventricular wall thickness is consistent with mild concentric hypertrophy.  · Mild mitral valve regurgitation is present            Assessment/Plan:     ASSESSMENT:  -Chest pressure, dyspnea, volume overload during pregnancy. Acute systolic HF symptoms. Improving with diuresis.  Echocardiogram with EF 45- 50% with LV apical hypokinesia and mild MR. EKG with PRWP. Troponin 0.059 this AM. Risk factors for CAD include tobacco abuse and familial history. Findings on cath suggest a healed spontaneous coronary artery dissection (SCAD) vs vasospasm. Cannot entirely rule out Takotsubo cardiomyopathy.   -35 weeks pregnant, now s/p C section  -Cough  -Obesity  -Hypertension     PLAN:  -HF: Continue beta blocker. Lasix 40mg PO with potassium 10meq daily upon discharge. Possible ACE-I cough. Uncertain use of  losartan in breastfeeding. Will hold off both ACEi and ARBs for now since her symptoms are improving and her blood pressure is stable on the beta-blocker dosing  -Findings that may represent healed spontaneous coronary artery dissection of the distal LAD: Continue beta blocker.  Will hold off ASA/Plavix due to breastfeeding status and     -Okay for discharge from a cardiac standpoint after post cath recovery  -Cardiac medications reconciled in the discharge navigator  -Follow-up in the cardiology clinic with Dr. Spence in 1 week to reassess Lasix dosing and repeat BMP/BNP    -Discussed with Dr Edvin Spence MD, MSc, FACC  Interventional Cardiology  [facility]

## 2019-03-19 NOTE — NURSING NOTE
I called [facility]B and spoke to the charge nurse. The patient came from that floor, had a  5 days ago. I explained that the patient was going to be discharged from [facility] today. I requested that [facility] send a nurse to go over discharge teaching regarding her delivery. The charge nurse told me that Dr Pardo would do the discharge teaching regarding her . Dr Pardo came to floor and spoke to patient. Cass Aguirre, Nurse Manager, called and spoke to Saadia Owens, Director of Mother Baby, to have a nurse come and do formal education prior to discharge.

## 2019-03-25 ENCOUNTER — POSTPARTUM VISIT (OUTPATIENT)
Dept: OBSTETRICS AND GYNECOLOGY | Facility: HOSPITAL | Age: 44
End: 2019-03-25

## 2019-03-25 VITALS
HEART RATE: 86 BPM | WEIGHT: 266 LBS | BODY MASS INDEX: 41.66 KG/M2 | RESPIRATION RATE: 18 BRPM | DIASTOLIC BLOOD PRESSURE: 65 MMHG | SYSTOLIC BLOOD PRESSURE: 145 MMHG

## 2019-03-25 DIAGNOSIS — Z98.891 STATUS POST CESAREAN SECTION: Primary | ICD-10-CM

## 2019-03-25 PROCEDURE — 0503F POSTPARTUM CARE VISIT: CPT | Performed by: OBSTETRICS & GYNECOLOGY

## 2019-03-25 RX ORDER — CETIRIZINE HYDROCHLORIDE 10 MG/1
10 TABLET ORAL AS NEEDED
COMMUNITY

## 2019-03-25 NOTE — PROGRESS NOTES
2 wk  PP  No c/o  BP good incision healing well  Legs NT, Edema reducing    Patient to see cardiology in 1 week, will discuss continued need for lasix    Patient plans 6 wkk PP visiti with dr Nowak

## 2019-04-01 ENCOUNTER — OFFICE VISIT (OUTPATIENT)
Dept: CARDIOLOGY | Facility: CLINIC | Age: 44
End: 2019-04-01

## 2019-04-01 ENCOUNTER — LAB REQUISITION (OUTPATIENT)
Dept: LAB | Facility: HOSPITAL | Age: 44
End: 2019-04-01

## 2019-04-01 VITALS
SYSTOLIC BLOOD PRESSURE: 116 MMHG | WEIGHT: 250 LBS | BODY MASS INDEX: 39.24 KG/M2 | DIASTOLIC BLOOD PRESSURE: 68 MMHG | HEIGHT: 67 IN | OXYGEN SATURATION: 97 % | HEART RATE: 72 BPM

## 2019-04-01 DIAGNOSIS — I10 ESSENTIAL HYPERTENSION: ICD-10-CM

## 2019-04-01 DIAGNOSIS — Z00.00 ROUTINE GENERAL MEDICAL EXAMINATION AT A HEALTH CARE FACILITY: ICD-10-CM

## 2019-04-01 DIAGNOSIS — I25.42 SPONTANEOUS DISSECTION OF CORONARY ARTERY: ICD-10-CM

## 2019-04-01 DIAGNOSIS — I50.31 ACUTE DIASTOLIC CHF (CONGESTIVE HEART FAILURE) (HCC): Primary | ICD-10-CM

## 2019-04-01 LAB — BNP SERPL-MCNC: 227 PG/ML (ref 0–100)

## 2019-04-01 PROCEDURE — 36415 COLL VENOUS BLD VENIPUNCTURE: CPT | Performed by: INTERNAL MEDICINE

## 2019-04-01 PROCEDURE — 99214 OFFICE O/P EST MOD 30 MIN: CPT | Performed by: INTERNAL MEDICINE

## 2019-04-01 PROCEDURE — 83880 ASSAY OF NATRIURETIC PEPTIDE: CPT | Performed by: INTERNAL MEDICINE

## 2019-04-01 RX ORDER — LABETALOL 100 MG/1
100 TABLET, FILM COATED ORAL EVERY 8 HOURS SCHEDULED
Qty: 90 TABLET | Refills: 3 | Status: SHIPPED | OUTPATIENT
Start: 2019-04-01 | End: 2019-07-08

## 2019-04-01 NOTE — PROGRESS NOTES
[facility] AT [address], [address]  [place], [place], [address] [phone]  [facility]           OUTPATIENT CLINIC FOLLOW UP NOTE    Patient Care Team:  Patient Care Team:  Carmen Power MD as PCP - General (Family Medicine)    Subjective:      Chief Complaint   Patient presents with   • Hypertension       HPI:    Tika Pugh is a 43 y.o. female.  The patient has a history of gestational hypertension, acute heart failure that is likely diastolic and systolic in nature during pregnancy with severe volume overload, spontaneous coronary artery dissection, all of which occurred in 3/2019.  Today the patient presents for follow-up.    Since her hospitalization she has lost a total of 60 pounds.  She has responded to Lasix and potassium very well.  She denies chest pain or shortness of breath.  She has had noticeable worsening diarrhea possibly due to her beta-blocker.  She also complains of some mild redness and discharge from her left radial artery access site.    Her cough that she had after starting lisinopril has improved    Review of Systems:  Positive for diarrhea  Negative for exertional chest pain, dyspnea with exertion, orthopnea, PND, lower extremity edema, palpitations, lightheadedness, syncope.     PFSH:  Patient Active Problem List   Diagnosis   • Chronic nonintractable headache   • Gastroesophageal reflux disease   • Maternal tobacco use in first trimester   • Rh negative status during pregnancy   • Anemia due to vitamin B12 deficiency   • Uterine leiomyoma   • AMA (advanced maternal age) multigravida 35+   • Carpal tunnel syndrome of left wrist   • Nephrotic range proteinuria   • Single umbilical artery   • Poor fetal growth affecting management of mother in third trimester   • Morbid obesity with BMI of 45.0-49.9, adult (CMS/[facility])   • Pre-eclampsia in third trimester   • Gestational hypertension   • Acute diastolic CHF (congestive heart failure)  "(CMS/[facility])   • Spontaneous dissection of coronary artery   • Essential hypertension         Current Outpatient Medications:   •  acetaminophen (TYLENOL) 325 MG tablet, Take 650 mg by mouth Every 6 (Six) Hours As Needed for Mild Pain ., Disp: , Rfl:   •  cetirizine (zyrTEC) 10 MG tablet, Take 10 mg by mouth As Needed., Disp: , Rfl:   •  furosemide (LASIX) 40 MG tablet, Take 1 tablet by mouth Daily., Disp: 30 tablet, Rfl: 5  •  labetalol (NORMODYNE) 100 MG tablet, Take 1 tablet by mouth Every 8 (Eight) Hours., Disp: 90 tablet, Rfl: 3  •  potassium chloride (MICRO-K) 10 MEQ CR capsule, Take 1 capsule by mouth Daily., Disp: 30 capsule, Rfl: 5  •  Prenatal Vit-Fe Fumarate-FA (PRENATAL VITAMIN 27-0.8) 27-0.8 MG tablet tablet, Take 1 tablet by mouth Daily., Disp: , Rfl:     Allergies   Allergen Reactions   • Lisinopril Cough        reports that she has been smoking cigarettes.  She has been smoking about 0.50 packs per day. She has never used smokeless tobacco.    Family History   Problem Relation Age of Onset   • Diabetes Father    • Hypertension Father    • Stroke Father    • Cancer Brother    • Heart disease Maternal Grandmother    • Stroke Paternal Grandmother          Objective:   Physical exam:  Blood pressure 116/68, pulse 72, height 170.2 cm (67\"), weight 113 kg (250 lb), SpO2 97 %, not currently breastfeeding.  CONSTITUTIONAL: No acute distress  RESPIRATORY: Normal effort. Clear to auscultation bilaterally without wheezing or rales  CARDIOVASCULAR: Carotids with normal upstrokes without bruits.  Regular rate and rhythm with normal S1 and S2. Without gallop or rub. Non radiating ZOE at RUSB. Normal radial pulse. There is significant lower extremity edema bilaterally.  PSYCH: Normal affect    Labs:    BUN   Date Value Ref Range Status   03/18/2019 13 9 - 23 mg/dL Final     Creatinine   Date Value Ref Range Status   03/18/2019 0.60 0.60 - 1.30 mg/dL Final     Potassium   Date Value Ref Range Status   03/18/2019 4.3 " 3.5 - 5.5 mmol/L Final     ALT (SGPT)   Date Value Ref Range Status   03/18/2019 9 7 - 40 U/L Final     AST (SGOT)   Date Value Ref Range Status   03/18/2019 12 0 - 33 U/L Final       No results found for: CHOL  No results found for: TRIG  No results found for: HDL  No results found for: LDL  No components found for: LDLDIRECTC    Diagnostic Data:    Procedures    C 3/2019  · There is no significant, flow-limiting coronary artery disease.  The distal LAD does taper to a very small caliber which may be consistent with a healed spontaneous coronary artery dissection versus vasospasm.  · Normal left ventricular ejection fraction 55% with hypokinesis of the apex  · Overall, cannot rule out spontaneous coronary artery disease dissection versus vasospasm of the distal LAD.  It is possible though that the patient had a stress-induced cardiomyopathy     TTE 3/2019  · Estimated EF = 45-50%  · LV apical hypokinesia.  · Left ventricular wall thickness is consistent with mild concentric hypertrophy.  · Mild mitral valve regurgitation is present    Assessment and Plan:   Tika was seen today for hypertension.    Diagnoses and all orders for this visit:    Acute diastolic CHF (congestive heart failure) (CMS/[facility])  -NYHA II, still swollen but improving  -Continue Lasix and K+  -Repeat BMP and BNP today    Spontaneous dissection of coronary artery  -Suspected dissection on heart catheterization.  Appears healed.  After weighing the risks and benefits of aspirin and statin at this time, we have decided to just treat with beta-blocker only.    Essential hypertension  -Low normal blood pressure at times, decrease labetalol to 100 mg 3 times daily.    -Of note the patient is not breast-feeding at this time.  Using formula.      - Return in about 3 months (around 7/1/2019).    Eb Spence MD, MSc, FACC

## 2019-04-02 ENCOUNTER — APPOINTMENT (OUTPATIENT)
Dept: LAB | Facility: HOSPITAL | Age: 44
End: 2019-04-02

## 2019-04-02 LAB
ANION GAP SERPL CALCULATED.3IONS-SCNC: 5.4 MMOL/L (ref 10–20)
BUN BLD-MCNC: 12 MG/DL (ref 7–20)
BUN/CREAT SERPL: 15 (ref 7.1–23.5)
CALCIUM SPEC-SCNC: 8.9 MG/DL (ref 8.4–10.2)
CHLORIDE SERPL-SCNC: 105 MMOL/L (ref 98–107)
CO2 SERPL-SCNC: 31 MMOL/L (ref 26–30)
CREAT BLD-MCNC: 0.8 MG/DL (ref 0.6–1.3)
GFR SERPL CREATININE-BSD FRML MDRD: 79 ML/MIN/1.73
GLUCOSE BLD-MCNC: 85 MG/DL (ref 74–98)
POTASSIUM BLD-SCNC: 3.4 MMOL/L (ref 3.5–5.1)
SODIUM BLD-SCNC: 138 MMOL/L (ref 137–145)

## 2019-04-02 PROCEDURE — 36415 COLL VENOUS BLD VENIPUNCTURE: CPT | Performed by: INTERNAL MEDICINE

## 2019-04-02 PROCEDURE — 80048 BASIC METABOLIC PNL TOTAL CA: CPT | Performed by: INTERNAL MEDICINE

## 2019-04-03 ENCOUNTER — TELEPHONE (OUTPATIENT)
Dept: CARDIOLOGY | Facility: CLINIC | Age: 44
End: 2019-04-03

## 2019-04-03 RX ORDER — POTASSIUM CHLORIDE 750 MG/1
20 CAPSULE, EXTENDED RELEASE ORAL DAILY
Qty: 180 CAPSULE | Refills: 3 | Status: SHIPPED | OUTPATIENT
Start: 2019-04-03 | End: 2019-04-25

## 2019-04-03 NOTE — TELEPHONE ENCOUNTER
----- Message from Eb Spence MD sent at 4/3/2019  2:42 PM EDT -----  Potassium low, due to ongoing Lasix. Can you confirm her potassium dosing.  I think she is taking 10 mEq daily.  Would have her double whatever dose she is taking of the potassium.  Continue same Lasix dosing.  Thanks

## 2019-04-03 NOTE — TELEPHONE ENCOUNTER
Spoke with pt about potassium levels. Advised to take 20 meq instead of the 10 meq. Called in a new RX just in case she needed more since were doubling it. Pt verbalized understanding and agreeable to plan.

## 2019-04-06 ENCOUNTER — RESULTS ENCOUNTER (OUTPATIENT)
Dept: CARDIOLOGY | Facility: CLINIC | Age: 44
End: 2019-04-06

## 2019-04-06 DIAGNOSIS — I50.31 ACUTE DIASTOLIC CHF (CONGESTIVE HEART FAILURE) (HCC): ICD-10-CM

## 2019-04-25 ENCOUNTER — POSTPARTUM VISIT (OUTPATIENT)
Dept: OBSTETRICS AND GYNECOLOGY | Facility: CLINIC | Age: 44
End: 2019-04-25

## 2019-04-25 ENCOUNTER — PREP FOR SURGERY (OUTPATIENT)
Dept: OTHER | Facility: HOSPITAL | Age: 44
End: 2019-04-25

## 2019-04-25 VITALS
BODY MASS INDEX: 37.67 KG/M2 | SYSTOLIC BLOOD PRESSURE: 126 MMHG | WEIGHT: 240 LBS | HEIGHT: 67 IN | DIASTOLIC BLOOD PRESSURE: 66 MMHG

## 2019-04-25 DIAGNOSIS — K64.4 EXTERNAL HEMORRHOID: ICD-10-CM

## 2019-04-25 DIAGNOSIS — Z30.2 REQUEST FOR STERILIZATION: Primary | ICD-10-CM

## 2019-04-25 DIAGNOSIS — N89.8 VAGINAL ODOR: Primary | ICD-10-CM

## 2019-04-25 PROCEDURE — 99214 OFFICE O/P EST MOD 30 MIN: CPT | Performed by: MIDWIFE

## 2019-04-25 RX ORDER — SODIUM CHLORIDE 0.9 % (FLUSH) 0.9 %
1-10 SYRINGE (ML) INJECTION AS NEEDED
Status: CANCELLED | OUTPATIENT
Start: 2019-04-25

## 2019-04-25 RX ORDER — SODIUM CHLORIDE 0.9 % (FLUSH) 0.9 %
3 SYRINGE (ML) INJECTION EVERY 12 HOURS SCHEDULED
Status: CANCELLED | OUTPATIENT
Start: 2019-04-25

## 2019-04-26 PROBLEM — O09.529 AMA (ADVANCED MATERNAL AGE) MULTIGRAVIDA 35+: Status: RESOLVED | Noted: 2018-12-03 | Resolved: 2019-04-26

## 2019-04-26 PROBLEM — Z30.2 REQUEST FOR STERILIZATION: Status: ACTIVE | Noted: 2019-04-26

## 2019-04-26 PROBLEM — O26.899 RH NEGATIVE STATUS DURING PREGNANCY: Status: RESOLVED | Noted: 2018-08-28 | Resolved: 2019-04-26

## 2019-04-26 PROBLEM — O14.93 PRE-ECLAMPSIA IN THIRD TRIMESTER: Status: RESOLVED | Noted: 2019-03-07 | Resolved: 2019-04-26

## 2019-04-26 PROBLEM — Z67.91 RH NEGATIVE STATUS DURING PREGNANCY: Status: RESOLVED | Noted: 2018-08-28 | Resolved: 2019-04-26

## 2019-04-26 PROBLEM — O36.5930 POOR FETAL GROWTH AFFECTING MANAGEMENT OF MOTHER IN THIRD TRIMESTER: Status: RESOLVED | Noted: 2019-02-14 | Resolved: 2019-04-26

## 2019-04-26 PROBLEM — O99.331 MATERNAL TOBACCO USE IN FIRST TRIMESTER: Status: RESOLVED | Noted: 2018-08-29 | Resolved: 2019-04-26

## 2019-04-26 PROBLEM — O13.9 GESTATIONAL HYPERTENSION: Status: RESOLVED | Noted: 2019-03-13 | Resolved: 2019-04-26

## 2019-04-26 RX ORDER — DIAPER,BRIEF,INFANT-TODD,DISP
EACH MISCELLANEOUS 2 TIMES DAILY
Qty: 25 G | Refills: 1 | Status: SHIPPED | OUTPATIENT
Start: 2019-04-26 | End: 2019-05-13

## 2019-04-26 NOTE — PROGRESS NOTES
"Subjective   Chief Complaint   Patient presents with   • Postpartum Care     delivered on 3/14/19  with Dr Pardo at [facility], last pap 18 LGSIL, bottle feeding, needs contraception, c/o hemorrhoid      Tika Pugh is a 43 y.o. year old  presenting to be seen for her postpartum visit.  She had a Primary  (LTCS). She delivered at 35 weeks At [facility] for HELLP syndrome. She also had a cardiac cath while there and has been following up cardiologist. She is taking Lisinopril and Lasix.    Since delivery she has not been sexually active.   She does not have concerns about post-partum blues/depression.   She is bottle feeding.  For ongoing contraception, her plans are tubal ligation. She knows for sure she doesn't want any more children.   She has not had a menstrual cycle.  She thinks she has a hemorrhoid and it has been bleeding some when she wipes. She noticed it last week. She hasn't used anything on it.  She has also noticed a vaginal odor. She denies itching or burning and minimal discharge.    The following portions of the patient's history were reviewed and updated as appropriate:current medications and allergies    Social History    Tobacco Use      Smoking status: Current Every Day Smoker        Packs/day: 0.50        Types: Cigarettes      Smokeless tobacco: Never Used      @ROS@  Review of Systems  Consitutional NEG: anorexia or night sweats    POS: nothing reported   Gastointestinal NEG: bloating, change in bowel habits, melena or reflux symptoms    POS: nothing reported   Genitourinary NEG: dysuria or hematuria    POS: nothing reported   Integument NEG: moles that are changing in size, shape, color or rashes    POS: nothing reported   Breast NEG: persistent breast lump, skin dimpling or nipple discharge    POS: nothing reported        Objective   /66   Ht 170.2 cm (67\")   Wt 109 kg (240 lb)   LMP  (LMP Unknown)   Breastfeeding? No   BMI 37.59 kg/m²     General:  " well developed; well nourished  no acute distress   Abdomen: soft, non-tender; no masses  no umbilical or inguinal hernias are present  incision is healed   Pelvis: External genitalia:  normal appearance of the external genitalia including Bartholin's and Shell's glands.  Uterus:  normal size, shape and consistency.  Adnexa:  normal bimanual exam of the adnexa.                              Small 1 cm hemorrhoid, medium consistency, pink, non thrombosed       Assessment   1. Normal 6 week postpartum exam  2. Desires tubal ligation  3. Hx of abnormal pap  4. Hemorrhoid     Plan   1. BC options reviewed and compared today: IUD - Mirena and tubal ligation. She requests tubal and will sign papers today.  2. Meds were prescribed - yes, Hydrocortisone cream 1% to hemorrhoids BID  3. Discussed abnormal pap and need for colposcopy  4. Follow up 2 weeks for colposcopy   5. Nuswab-she will be notified of results    New Medications Ordered This Visit   Medications   • hydrocortisone 1 % ointment     Sig: Apply  topically to the appropriate area as directed 2 (Two) Times a Day.     Dispense:  25 g     Refill:  1          This note was electronically signed.  Cierra Kinney, LISSETH  4/26/2019

## 2019-04-27 LAB
A VAGINAE DNA VAG QL NAA+PROBE: NORMAL SCORE
BVAB2 DNA VAG QL NAA+PROBE: NORMAL SCORE
C ALBICANS DNA VAG QL NAA+PROBE: NEGATIVE
C GLABRATA DNA VAG QL NAA+PROBE: NEGATIVE
C TRACH RRNA SPEC QL NAA+PROBE: NEGATIVE
MEGA1 DNA VAG QL NAA+PROBE: NORMAL SCORE
N GONORRHOEA RRNA SPEC QL NAA+PROBE: NEGATIVE
T VAGINALIS RRNA SPEC QL NAA+PROBE: NEGATIVE

## 2019-05-10 ENCOUNTER — APPOINTMENT (OUTPATIENT)
Dept: PREADMISSION TESTING | Facility: HOSPITAL | Age: 44
End: 2019-05-10

## 2019-05-13 ENCOUNTER — APPOINTMENT (OUTPATIENT)
Dept: PREADMISSION TESTING | Facility: HOSPITAL | Age: 44
End: 2019-05-13

## 2019-05-13 ENCOUNTER — HOSPITAL ENCOUNTER (OUTPATIENT)
Dept: GENERAL RADIOLOGY | Facility: HOSPITAL | Age: 44
Discharge: HOME | End: 2019-05-13
Admitting: OBSTETRICS & GYNECOLOGY

## 2019-05-13 VITALS — HEIGHT: 67 IN | WEIGHT: 237 LBS | BODY MASS INDEX: 37.2 KG/M2

## 2019-05-13 DIAGNOSIS — Z30.2 REQUEST FOR STERILIZATION: ICD-10-CM

## 2019-05-13 LAB
ANION GAP SERPL CALCULATED.3IONS-SCNC: 11.3 MMOL/L (ref 10–20)
BACTERIA UR QL AUTO: ABNORMAL /HPF
BASOPHILS # BLD AUTO: 0.05 10*3/MM3 (ref 0–0.2)
BASOPHILS NFR BLD AUTO: 0.7 % (ref 0–1.5)
BILIRUB UR QL STRIP: NEGATIVE
BUN BLD-MCNC: 12 MG/DL (ref 7–20)
BUN/CREAT SERPL: 17.1 (ref 7.1–23.5)
CALCIUM SPEC-SCNC: 9.3 MG/DL (ref 8.4–10.2)
CHLORIDE SERPL-SCNC: 102 MMOL/L (ref 98–107)
CLARITY UR: CLEAR
CO2 SERPL-SCNC: 28 MMOL/L (ref 26–30)
COLOR UR: YELLOW
CREAT BLD-MCNC: 0.7 MG/DL (ref 0.6–1.3)
DEPRECATED RDW RBC AUTO: 46.8 FL (ref 37–54)
EOSINOPHIL # BLD AUTO: 0.24 10*3/MM3 (ref 0–0.4)
EOSINOPHIL NFR BLD AUTO: 3.1 % (ref 0.3–6.2)
ERYTHROCYTE [DISTWIDTH] IN BLOOD BY AUTOMATED COUNT: 14.5 % (ref 12.3–15.4)
GFR SERPL CREATININE-BSD FRML MDRD: 92 ML/MIN/1.73
GLUCOSE BLD-MCNC: 85 MG/DL (ref 74–98)
GLUCOSE UR STRIP-MCNC: NEGATIVE MG/DL
HCT VFR BLD AUTO: 40.3 % (ref 34–46.6)
HGB BLD-MCNC: 12.9 G/DL (ref 12–15.9)
HGB UR QL STRIP.AUTO: ABNORMAL
HYALINE CASTS UR QL AUTO: ABNORMAL /LPF
IMM GRANULOCYTES # BLD AUTO: 0.02 10*3/MM3 (ref 0–0.05)
IMM GRANULOCYTES NFR BLD AUTO: 0.3 % (ref 0–0.5)
KETONES UR QL STRIP: NEGATIVE
LEUKOCYTE ESTERASE UR QL STRIP.AUTO: NEGATIVE
LYMPHOCYTES # BLD AUTO: 2.48 10*3/MM3 (ref 0.7–3.1)
LYMPHOCYTES NFR BLD AUTO: 32.2 % (ref 19.6–45.3)
MCH RBC QN AUTO: 28.6 PG (ref 26.6–33)
MCHC RBC AUTO-ENTMCNC: 32 G/DL (ref 31.5–35.7)
MCV RBC AUTO: 89.4 FL (ref 79–97)
MONOCYTES # BLD AUTO: 0.36 10*3/MM3 (ref 0.1–0.9)
MONOCYTES NFR BLD AUTO: 4.7 % (ref 5–12)
MUCOUS THREADS URNS QL MICRO: ABNORMAL /HPF
NEUTROPHILS # BLD AUTO: 4.54 10*3/MM3 (ref 1.7–7)
NEUTROPHILS NFR BLD AUTO: 59 % (ref 42.7–76)
NITRITE UR QL STRIP: NEGATIVE
NRBC BLD AUTO-RTO: 0 /100 WBC (ref 0–0.2)
PH UR STRIP.AUTO: 6 [PH] (ref 5–8)
PLATELET # BLD AUTO: 314 10*3/MM3 (ref 140–450)
PMV BLD AUTO: 10 FL (ref 6–12)
POTASSIUM BLD-SCNC: 4.3 MMOL/L (ref 3.5–5.1)
PROT UR QL STRIP: ABNORMAL
RBC # BLD AUTO: 4.51 10*6/MM3 (ref 3.77–5.28)
RBC # UR: ABNORMAL /HPF
REF LAB TEST METHOD: ABNORMAL
SODIUM BLD-SCNC: 137 MMOL/L (ref 137–145)
SP GR UR STRIP: 1.01 (ref 1–1.03)
SQUAMOUS #/AREA URNS HPF: ABNORMAL /HPF
UROBILINOGEN UR QL STRIP: ABNORMAL
WBC NRBC COR # BLD: 7.69 10*3/MM3 (ref 3.4–10.8)
WBC UR QL AUTO: ABNORMAL /HPF

## 2019-05-13 PROCEDURE — 71045 X-RAY EXAM CHEST 1 VIEW: CPT

## 2019-05-13 PROCEDURE — 36415 COLL VENOUS BLD VENIPUNCTURE: CPT

## 2019-05-13 PROCEDURE — 80048 BASIC METABOLIC PNL TOTAL CA: CPT | Performed by: OBSTETRICS & GYNECOLOGY

## 2019-05-13 PROCEDURE — 81001 URINALYSIS AUTO W/SCOPE: CPT | Performed by: MIDWIFE

## 2019-05-13 PROCEDURE — 85025 COMPLETE CBC W/AUTO DIFF WBC: CPT | Performed by: MIDWIFE

## 2019-05-13 RX ORDER — ACETAMINOPHEN 500 MG
500 TABLET ORAL EVERY 6 HOURS PRN
COMMUNITY

## 2019-05-13 RX ORDER — POTASSIUM CHLORIDE 20 MEQ/1
20 TABLET, EXTENDED RELEASE ORAL DAILY PRN
COMMUNITY
End: 2019-10-09

## 2019-05-13 NOTE — PAT
Preadmission testing visit completed with patient in preparation for upcoming tubal ligation scheduled for 19.  During health history patient reported that she had a 60# weight gain over a 10 days period towards the end of her pregnancy and that she was sent to [facility].  Patient went on to report that MD there had mentioned CHF and HELLP syndrome.  Patient reported that she experienced chest pain on 19, delivered via primary  section on 19 and had a cardiac cath on 19.  Patient reported that no stents were placed, but that she was told that she had a MI during her pregnancy at some point.  Patient reported no complaints of chest pain or SOA during PAT health history , that she is taking Lasix PRN for fluid retention and that her next visit with Dr Spence is scheduled for 2019.    After health history done, RN consulted Kimmy Workman CRNA.  Discussed the timeline of the information as noted above.  Also notified CRNA of patient's 19 EKG report and the cardiac cath report from 19.  CRNA also aware of patient's medication list and her report that the next cardiology appointment was schedule for 2019.      After discussion with Se HERNÁNDEZ reported that no additional testing, work up or clearance was needed before proceeding with 19 procedure as scheduled.

## 2019-05-16 ENCOUNTER — OFFICE VISIT (OUTPATIENT)
Dept: OBSTETRICS AND GYNECOLOGY | Facility: CLINIC | Age: 44
End: 2019-05-16

## 2019-05-16 VITALS
DIASTOLIC BLOOD PRESSURE: 70 MMHG | BODY MASS INDEX: 37.51 KG/M2 | SYSTOLIC BLOOD PRESSURE: 128 MMHG | HEIGHT: 67 IN | WEIGHT: 239 LBS

## 2019-05-16 DIAGNOSIS — I50.31 ACUTE DIASTOLIC CHF (CONGESTIVE HEART FAILURE) (HCC): ICD-10-CM

## 2019-05-16 DIAGNOSIS — Z30.8 ENCOUNTER FOR OTHER CONTRACEPTIVE MANAGEMENT: ICD-10-CM

## 2019-05-16 DIAGNOSIS — Z87.42 HISTORY OF ABNORMAL CERVICAL PAPANICOLAOU SMEAR: Primary | ICD-10-CM

## 2019-05-16 PROCEDURE — 99214 OFFICE O/P EST MOD 30 MIN: CPT | Performed by: OBSTETRICS & GYNECOLOGY

## 2019-05-16 NOTE — PROGRESS NOTES
Subjective  Chief Complaint   Patient presents with   • Follow-up     repeat pap smear, last pap 2018-LSIL     Patient is 43 y.o.  here for for evaluation of an abnormal Pap smear during pregnancy.  Patient had a Pap smear on 2018.  Her Pap smear returned with low-grade squamous intraepithelial lesion.  Patient has had no further evaluation since that time.  Patient denies any previous abnormal Pap smears in the past.  Patient did have a recent pregnancy with marked complications.  Patient was delivered at 35 weeks gestation secondary to severe preeclampsia.  Her postpartum course included help syndrome.  Patient also had acute congestive heart failure.  Patient had a heart catheterization performed during her hospitalization.  It was felt that the patient had a possible spontaneous dissection of her coronary artery.  The patient has had one follow-up with cardiology.  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  The possibility of stress-induced cardiomyopathy myopathy also exist.  The patient had an ejection fraction of 45 to 50%.  He did have a left ventricle apical hypokinesis.  It was decided at that time that the patient would continue her Lasix and potassium.  The addition of a beta-blocker was also given.  Patient has a follow-up with her cardiologist in 3 months.  The patient reports that she has a tubal ligation scheduled.  The patient is desiring permanent surgical sterilization.  The patient is certain she no longer desires childbearing.    History  Past Medical History:   Diagnosis Date   • Anesthesia     Patient reported that she is very slow to wake after anesthesia   • Asthma 2019    Reported no use of inhalers for several years now   • Body piercing     ears   • Carpal tunnel syndrome during pregnancy    • Chest pain 2019    Reported during pregnancy and that she  "remembered experiencing this the day prior to delivery.    • CHF (congestive heart failure) (CMS/[facility])     Reported this was a questionable diagnosis during pregnancy (delivered via P C/S 03-14-19)   • Frequent UTI    • GERD (gastroesophageal reflux disease)     Reported that this has improved since delivery    • HELLP syndrome    • Hematuria     Reported history of recurrent, unknown etiology   • Hemorrhoid    • History of bronchitis    • History of cardiac murmur in childhood    • History of fracture     Left wrist - no surgical intervention was required   • History of pneumonia    • Hx of gastric ulcer    • Hypertension     Reported \"only during pregnancy\"   • Kidney stone     Reported history of kidney stones but no history of surgical intervention   • MI (myocardial infarction) (CMS/[facility])     Patient reported that she was told after delivery of child 03-14-19 and cardiac cath 03-18-19 that she had a MI sometime during her pregnancy.  Patient reported that during pregnancy she was sent to [facility] for weight gain of 60# in a 10 day period.    • Migraine    • Proteinuria     Reported recurrent history, unknown etiology   • Rh negative status during pregnancy 08/28/2018   • Tattoos    • Ulcer of abdomen wall (CMS/[facility])    • Urinary tract infection    • Wears contact lenses     Advised to wear glasses the DOS   • Wears glasses    • Wears partial dentures     Upper partial - instructed no adhesives the DOS     Current Outpatient Medications on File Prior to Visit   Medication Sig Dispense Refill   • acetaminophen (TYLENOL) 500 MG tablet Take 500 mg by mouth Every 6 (Six) Hours As Needed for Mild Pain  or Headache.     • cetirizine (zyrTEC) 10 MG tablet Take 10 mg by mouth As Needed for Allergies or Rhinitis.     • furosemide (LASIX) 40 MG tablet Take 1 tablet by mouth Daily. (Patient taking differently: Take 40 mg by mouth Daily As Needed (Fluid retention).) 30 tablet 5   • labetalol (NORMODYNE) 100 MG tablet " Take 1 tablet by mouth Every 8 (Eight) Hours. (Patient taking differently: Take 50 mg by mouth 2 (Two) Times a Day.) 90 tablet 3   • potassium chloride (K-DUR,KLOR-CON) 20 MEQ CR tablet Take 20 mEq by mouth Daily As Needed (To take as supplement on the days that Lasix is taken).       No current facility-administered medications on file prior to visit.      Allergies   Allergen Reactions   • Lisinopril Cough     Past Surgical History:   Procedure Laterality Date   • CARDIAC CATHETERIZATION N/A 3/19/2019    Procedure: LEFT HEART CATH;  Surgeon: Eb Spence MD;  Location:  [facility] CATH INVASIVE LOCATION;  Service: Cardiology   •  SECTION N/A 3/14/2019    Procedure:  SECTION PRIMARY;  Surgeon: Ramses Pardo III, MD;  Location: [facility] LABOR DELIVERY;  Service: Obstetrics/Gynecology   • CYSTOSCOPY     • ENDOSCOPY     • KNEE ACL RECONSTRUCTION Left    • TONSILLECTOMY     • WISDOM TOOTH EXTRACTION       Family History   Problem Relation Age of Onset   • Diabetes Father    • Hypertension Father    • Stroke Father    • Cancer Brother    • Heart disease Maternal Grandmother    • Stroke Paternal Grandmother      Social History     Socioeconomic History   • Marital status: Single     Spouse name: Not on file   • Number of children: Not on file   • Years of education: Not on file   • Highest education level: Not on file   Tobacco Use   • Smoking status: Current Every Day Smoker     Packs/day: 0.50     Years: 23.00     Pack years: 11.50     Types: Cigarettes   • Smokeless tobacco: Never Used   Substance and Sexual Activity   • Alcohol use: No   • Drug use: No   • Sexual activity: Yes     Partners: Male     Birth control/protection: None     Review of Systems  The following systems were reviewed and negative:  constitution, eyes, ENT, respiratory, cardiovascular, gastrointestinal, genitourinary, integument, breast, hematologic / lymphatic, musculoskeletal, neurological, behavioral/psych, endocrine and  "allergies / immunologic     Objective  Vitals:    05/16/19 1319   BP: 128/70   Weight: 108 kg (239 lb)   Height: 170.2 cm (67\")     Physical Exam:  General Appearance: alert, appears stated age and cooperative  Head: normocephalic, without obvious abnormality and atraumatic  Eyes: lids and lashes normal, conjunctivae and sclerae normal, no icterus, no pallor, corneas clear and PERRLA  Ears: ears appear intact with no abnormalities noted  Nose: nares normal, septum midline, mucosa normal and no drainage  Neck: suppple, trachea midline and no thyromegaly  Lungs: clear to auscultation, respirations regular, respirations even and respirations unlabored  Heart: regular rhythm and normal rate, normal S1, S2, no murmur, gallop, or rubs and no click  Breasts: Not performed.  Abdomen: normal bowel sounds, no masses, no hepatomegaly, no splenomegaly, soft non-tender, no guarding and no rebound tenderness  Pelvic: Clinical staff was present for exam  External genitalia:  normal appearance of the external genitalia including Bartholin's and Riverbank's glands.  :  urethral meatus normal;  Vaginal:  normal pink mucosa without prolapse or lesions.  Cervix:  normal appearance.  Uterus:  normal size, shape and consistency.  Adnexa:  non palpable bilaterally.  Pap smear done and specimen sent using Thin-Prep technique  Extremities: moves extremities well, no edema, no cyanosis and no redness  Skin: no bleeding, bruising or rash and no lesions noted  Lymph Nodes: no palpable adenopathy  Neuro: CN II-X grossly intact; sensation intact  Psych: normal mood and affect, oriented to person, time and place, thought content organized and appropriate judgment  Lab Review   pap smear  Medical records as noted above with summary  Imaging   No data reviewed    Assessment/Plan  Problem List Items Addressed This Visit        Cardiovascular and Mediastinum    Acute diastolic CHF (congestive heart failure) (CMS/HCC)      Other Visit Diagnoses     " History of abnormal cervical Papanicolaou smear    -  Primary New  Will repeat Pap smear today given the length of time since her previous Pap smear.  Pap smear is to be spent rush.  If her Pap smear remains abnormal then patient will need colposcopy with biopsies for further evaluation.  Plan pending results.    Relevant Orders    Pap IG, Rfx HPV ASCU    Encounter for other contraceptive management    New  I had a long detailed and extensive discussion with the patient regarding contraception.  I clearly agree the patient needs permanent surgical sterilization given her age as well as extensive medical history and complications with her delivery.  Given however the recent possibility of a coronary artery dissection I will consult further with her cardiologist regarding timing of this procedure.  Patient will need cardiac clearance prior to her tubal ligation.  I have reviewed with the patient the risks, complications, benefits, and other alternatives to surgery in detail.  The risks reviewed include but not limited to pain, numbness, swelling, and scarring from surgery.  Bleeding during or after the surgery which may require the need for blood transfusions or other treatment and surgery with additional risk as discussed.  Infection including wound infection, poor healing or reopening of the incision(s) as well as pelvic and/or abdominal infection, urinary tract infection, pneumonia, and sepsis.  Damage to bladder, ureters, bowel, and other pelvic or abdominal structures which may be discovered during the procedure or later requiring additional treatment and surgery.  Damage to ovaries, uterus, or other nearby structures.  Damage to nerves, blood vessels, and/or other structures in the surgical area.  Risk of DVTs, embolus from blood clots or gas if used during procedure.  The permanent nature of the procedure was discussed. The failure rate of the procedure with subsequent pregnancy with higher rate of ectopic  gestation if tubal fails was also discussed. The patient was informed regarding complications and reactions to the anesthetic including heart attack, cardiac arrest, stroke, and death. The possibility of not being able to perform the procedure through the laparoscope or a small incision, requiring laparotomy was also discussed.  All questions were answered.  Patient is in agreement with the planned procedure.      Total time spent today with Tika  was 30 minutes (level 4).  Greater than 50% of the time was spent face to face coordinating and planning care, answering her questions and counseling regarding pathophysiology of her presenting problem along with plans for any diagnositc work-up and treatment.  Follow up as discussed/scheduled  This note was electronically signed.  Monse Wayne M.D.

## 2019-05-17 ENCOUNTER — TELEPHONE (OUTPATIENT)
Dept: OBSTETRICS AND GYNECOLOGY | Facility: CLINIC | Age: 44
End: 2019-05-17

## 2019-05-17 DIAGNOSIS — I25.2 HISTORY OF HEART ATTACK: Primary | ICD-10-CM

## 2019-05-17 NOTE — TELEPHONE ENCOUNTER
----- Message from Monse Wayne MD sent at 5/17/2019  8:33 AM EDT -----  Please send her pap rush.  Also patient will need cardiac clearance prior to her tubal.

## 2019-05-22 DIAGNOSIS — Z87.42 HISTORY OF ABNORMAL CERVICAL PAPANICOLAOU SMEAR: ICD-10-CM

## 2019-05-28 ENCOUNTER — TELEPHONE (OUTPATIENT)
Dept: CARDIOLOGY | Facility: CLINIC | Age: 44
End: 2019-05-28

## 2019-05-28 NOTE — TELEPHONE ENCOUNTER
----- Message from Eb Spence MD sent at 5/24/2019  5:16 PM EDT -----  Okay to proceed with surgery without further cardiac testing.    ----- Message -----  From: Kelsea Farley RN  Sent: 5/24/2019   2:56 PM  To: Eb Spence MD    Ms. Pugh is having a tubal ligation with Dr. Monse Wayne. They are requesting surgical clearance. She was last seen in office on 04/19 and her next follow up is 7/19.      Thoughts?

## 2019-07-08 ENCOUNTER — OFFICE VISIT (OUTPATIENT)
Dept: CARDIOLOGY | Facility: CLINIC | Age: 44
End: 2019-07-08

## 2019-07-08 VITALS
DIASTOLIC BLOOD PRESSURE: 76 MMHG | HEART RATE: 67 BPM | WEIGHT: 252 LBS | OXYGEN SATURATION: 98 % | SYSTOLIC BLOOD PRESSURE: 106 MMHG | BODY MASS INDEX: 39.55 KG/M2 | HEIGHT: 67 IN

## 2019-07-08 DIAGNOSIS — I50.22 CHRONIC SYSTOLIC CONGESTIVE HEART FAILURE (HCC): Primary | ICD-10-CM

## 2019-07-08 DIAGNOSIS — I25.42 SPONTANEOUS DISSECTION OF CORONARY ARTERY: ICD-10-CM

## 2019-07-08 DIAGNOSIS — I10 ESSENTIAL HYPERTENSION: ICD-10-CM

## 2019-07-08 PROCEDURE — 99214 OFFICE O/P EST MOD 30 MIN: CPT | Performed by: INTERNAL MEDICINE

## 2019-07-08 NOTE — PROGRESS NOTES
[facility] AT [address], [address]  [place], [place], [address] [phone]  [email or web address]           OUTPATIENT CLINIC FOLLOW UP NOTE    Patient Care Team:  Patient Care Team:  Carmen Power MD as PCP - General (Family Medicine)    Subjective:      Chief Complaint   Patient presents with   • Congestive Heart Failure   • Surgical Clearance       HPI:    Tika Pugh is a 43 y.o. female.  The patient has a history of gestational hypertension, acute heart failure that is likely diastolic and systolic in nature during pregnancy with severe volume overload, spontaneous coronary artery dissection, all of which occurred in 3/2019.  Today the patient presents for follow-up.    Since patient was last seen she reports that she has had significant fatigue.  She stopped taking her labetalol due to this without improvement.  She also notes that she has only taken her Lasix twice since her last visit.  She denies any chest pain, shortness of breath, lightheadedness, syncope, or palpitations.    Review of Systems:  Positive for fatigue.  Negative for exertional chest pain, dyspnea with exertion, orthopnea, PND, lower extremity edema, palpitations, lightheadedness, syncope.     PFSH:  Patient Active Problem List   Diagnosis   • Chronic nonintractable headache   • Gastroesophageal reflux disease   • Anemia due to vitamin B12 deficiency   • Uterine leiomyoma   • Carpal tunnel syndrome of left wrist   • Nephrotic range proteinuria   • Single umbilical artery   • Morbid obesity with BMI of 45.0-49.9, adult (CMS/HCC)   • Acute diastolic CHF (congestive heart failure) (CMS/HCC)   • Spontaneous dissection of coronary artery   • Essential hypertension   • Request for sterilization         Current Outpatient Medications:   •  acetaminophen (TYLENOL) 500 MG tablet, Take 500 mg by mouth Every 6 (Six) Hours As Needed for Mild Pain  or Headache., Disp: , Rfl:   •  cetirizine (zyrTEC) 10  "MG tablet, Take 10 mg by mouth As Needed for Allergies or Rhinitis., Disp: , Rfl:   •  furosemide (LASIX) 40 MG tablet, Take 1 tablet by mouth Daily. (Patient taking differently: Take 40 mg by mouth Daily As Needed (Fluid retention).), Disp: 30 tablet, Rfl: 5  •  potassium chloride (K-DUR,KLOR-CON) 20 MEQ CR tablet, Take 20 mEq by mouth Daily As Needed (To take as supplement on the days that Lasix is taken)., Disp: , Rfl:     Allergies   Allergen Reactions   • Lisinopril Cough        reports that she has been smoking cigarettes.  She has a 17.25 pack-year smoking history. She has never used smokeless tobacco.    Family History   Problem Relation Age of Onset   • Diabetes Father    • Hypertension Father    • Stroke Father    • Cancer Brother    • Heart disease Maternal Grandmother    • Stroke Paternal Grandmother          Objective:   Physical exam:  Blood pressure 106/76, pulse 67, height 170.2 cm (67\"), weight 114 kg (252 lb), SpO2 98 %, not currently breastfeeding.  CONSTITUTIONAL: No acute distress  RESPIRATORY: Normal effort. Clear to auscultation bilaterally without wheezing or rales  CARDIOVASCULAR: Carotids with normal upstrokes without bruits.  Regular rate and rhythm with normal S1 and S2. Without gallop or rub. Non radiating ZOE at RUSB. Normal radial pulse. There is no lower extremity edema bilaterally.  PSYCH: Normal affect    Labs:    BUN   Date Value Ref Range Status   05/13/2019 12 7 - 20 mg/dL Final     Creatinine   Date Value Ref Range Status   05/13/2019 0.70 0.60 - 1.30 mg/dL Final     Potassium   Date Value Ref Range Status   05/13/2019 4.3 3.5 - 5.1 mmol/L Final     ALT (SGPT)   Date Value Ref Range Status   03/18/2019 9 7 - 40 U/L Final     AST (SGOT)   Date Value Ref Range Status   03/18/2019 12 0 - 33 U/L Final       No results found for: CHOL  No results found for: TRIG  No results found for: HDL  No results found for: LDL  No components found for: LDLDIRECTC    Diagnostic Data:  "   Procedures    [facility] 3/2019  · There is no significant, flow-limiting coronary artery disease.  The distal LAD does taper to a very small caliber which may be consistent with a healed spontaneous coronary artery dissection versus vasospasm.  · Normal left ventricular ejection fraction 55% with hypokinesis of the apex  · Overall, cannot rule out spontaneous coronary artery disease dissection versus vasospasm of the distal LAD.  It is possible though that the patient had a stress-induced cardiomyopathy     TTE 3/2019  · Estimated EF = 45-50%  · LV apical hypokinesia.  · Left ventricular wall thickness is consistent with mild concentric hypertrophy.  · Mild mitral valve regurgitation is present    Assessment and Plan:   Tika was seen today for hypertension.    Diagnoses and all orders for this visit:    Chronic systolic CHF (congestive heart failure) (CMS/[facility])  -Euvolemic at this time. Significant fatigue present.   -Continue Lasix and K+ PRN.  -Repeat limited TTE for EF and Intracaviatry gradient.     Spontaneous dissection of coronary artery  -Suspected dissection on heart catheterization.  Appears healed.    -Beta blocker discontinued due to hypotension    Essential hypertension  -At goal off of antihypertensives.    -Of note the patient is not breast-feeding at this time.  Using formula.    - Return in about 6 months (around 1/8/2020).    Scribed for Eb Spence MD by Eileen Wisdom, LISSETH   7/8/2019  4:00 PM     I, Eb Spence MD, personally performed the services as scribed by the above named individual. I have made any necessary edits and it is both accurate and complete.     Eb Spence MD, MSc, [facility]  Interventional Cardiology  [facility]

## 2019-07-15 ENCOUNTER — HOSPITAL ENCOUNTER (OUTPATIENT)
Dept: CARDIOLOGY | Facility: HOSPITAL | Age: 44
Discharge: HOME | End: 2019-07-15
Admitting: NURSE PRACTITIONER

## 2019-07-15 DIAGNOSIS — I50.22 CHRONIC SYSTOLIC CONGESTIVE HEART FAILURE (HCC): ICD-10-CM

## 2019-07-15 PROCEDURE — 93321 DOPPLER ECHO F-UP/LMTD STD: CPT | Performed by: INTERNAL MEDICINE

## 2019-07-15 PROCEDURE — 93325 DOPPLER ECHO COLOR FLOW MAPG: CPT

## 2019-07-15 PROCEDURE — 93308 TTE F-UP OR LMTD: CPT

## 2019-07-15 PROCEDURE — 93308 TTE F-UP OR LMTD: CPT | Performed by: INTERNAL MEDICINE

## 2019-07-15 PROCEDURE — 93321 DOPPLER ECHO F-UP/LMTD STD: CPT

## 2019-07-15 PROCEDURE — 93325 DOPPLER ECHO COLOR FLOW MAPG: CPT | Performed by: INTERNAL MEDICINE

## 2019-07-16 LAB
BH CV ECHO MEAS - AO MAX PG (FULL): 5.3 MMHG
BH CV ECHO MEAS - AO MAX PG: 12 MMHG
BH CV ECHO MEAS - AO MEAN PG (FULL): 3 MMHG
BH CV ECHO MEAS - AO MEAN PG: 6 MMHG
BH CV ECHO MEAS - AO ROOT AREA (BSA CORRECTED): 1.3
BH CV ECHO MEAS - AO ROOT AREA: 6.6 CM^2
BH CV ECHO MEAS - AO ROOT DIAM: 2.9 CM
BH CV ECHO MEAS - AO V2 MAX: 176 CM/SEC
BH CV ECHO MEAS - AO V2 MEAN: 117 CM/SEC
BH CV ECHO MEAS - AO V2 VTI: 44.3 CM
BH CV ECHO MEAS - AVA(I,A): 2.3 CM^2
BH CV ECHO MEAS - AVA(I,D): 2.3 CM^2
BH CV ECHO MEAS - AVA(V,A): 2.3 CM^2
BH CV ECHO MEAS - AVA(V,D): 2.3 CM^2
BH CV ECHO MEAS - BSA(HAYCOCK): 2.4 M^2
BH CV ECHO MEAS - BSA: 2.2 M^2
BH CV ECHO MEAS - BZI_BMI: 40.7 KILOGRAMS/M^2
BH CV ECHO MEAS - BZI_METRIC_HEIGHT: 167.6 CM
BH CV ECHO MEAS - BZI_METRIC_WEIGHT: 114.3 KG
BH CV ECHO MEAS - EDV(CUBED): 101.8 ML
BH CV ECHO MEAS - EDV(TEICH): 100.8 ML
BH CV ECHO MEAS - EF(CUBED): 52.3 %
BH CV ECHO MEAS - EF(MOD-BP): 55 %
BH CV ECHO MEAS - EF(TEICH): 44.2 %
BH CV ECHO MEAS - ESV(CUBED): 48.6 ML
BH CV ECHO MEAS - ESV(TEICH): 56.3 ML
BH CV ECHO MEAS - FS: 21.8 %
BH CV ECHO MEAS - IVS/LVPW: 0.87
BH CV ECHO MEAS - IVSD: 0.99 CM
BH CV ECHO MEAS - LA DIMENSION: 3.5 CM
BH CV ECHO MEAS - LA/AO: 1.2
BH CV ECHO MEAS - LV MASS(C)D: 176.1 GRAMS
BH CV ECHO MEAS - LV MASS(C)DI: 79.8 GRAMS/M^2
BH CV ECHO MEAS - LV MAX PG: 6.7 MMHG
BH CV ECHO MEAS - LV MEAN PG: 3 MMHG
BH CV ECHO MEAS - LV V1 MAX: 129 CM/SEC
BH CV ECHO MEAS - LV V1 MEAN: 79.4 CM/SEC
BH CV ECHO MEAS - LV V1 VTI: 32.5 CM
BH CV ECHO MEAS - LVIDD: 4.7 CM
BH CV ECHO MEAS - LVIDS: 3.7 CM
BH CV ECHO MEAS - LVOT AREA (M): 3.1 CM^2
BH CV ECHO MEAS - LVOT AREA: 3.1 CM^2
BH CV ECHO MEAS - LVOT DIAM: 2 CM
BH CV ECHO MEAS - LVPWD: 1.1 CM
BH CV ECHO MEAS - MV A MAX VEL: 67.1 CM/SEC
BH CV ECHO MEAS - MV E MAX VEL: 109 CM/SEC
BH CV ECHO MEAS - MV E/A: 1.6
BH CV ECHO MEAS - RAP SYSTOLE: 3 MMHG
BH CV ECHO MEAS - RVSP: 18.1 MMHG
BH CV ECHO MEAS - SI(AO): 132.6 ML/M^2
BH CV ECHO MEAS - SI(CUBED): 24.1 ML/M^2
BH CV ECHO MEAS - SI(LVOT): 46.3 ML/M^2
BH CV ECHO MEAS - SI(TEICH): 20.2 ML/M^2
BH CV ECHO MEAS - SV(AO): 292.6 ML
BH CV ECHO MEAS - SV(CUBED): 53.2 ML
BH CV ECHO MEAS - SV(LVOT): 102.1 ML
BH CV ECHO MEAS - SV(TEICH): 44.6 ML
BH CV ECHO MEAS - TR MAX PG: 15.1 MMHG
BH CV ECHO MEAS - TR MAX VEL: 194 CM/SEC

## 2019-07-17 ENCOUNTER — TELEPHONE (OUTPATIENT)
Dept: CARDIOLOGY | Facility: CLINIC | Age: 44
End: 2019-07-17

## 2019-07-17 NOTE — TELEPHONE ENCOUNTER
----- Message from LISSETH Larsen sent at 7/17/2019  9:01 AM EDT -----  Can you let the patient know that her EF has improved slightly. No changes to current plan of care.

## 2019-07-22 ENCOUNTER — PREP FOR SURGERY (OUTPATIENT)
Dept: OTHER | Facility: HOSPITAL | Age: 44
End: 2019-07-22

## 2019-07-22 DIAGNOSIS — Z30.2 REQUEST FOR STERILIZATION: Primary | ICD-10-CM

## 2019-07-22 RX ORDER — SODIUM CHLORIDE 0.9 % (FLUSH) 0.9 %
1-10 SYRINGE (ML) INJECTION AS NEEDED
Status: CANCELLED | OUTPATIENT
Start: 2019-07-22

## 2019-07-22 RX ORDER — SODIUM CHLORIDE 0.9 % (FLUSH) 0.9 %
3 SYRINGE (ML) INJECTION EVERY 12 HOURS SCHEDULED
Status: CANCELLED | OUTPATIENT
Start: 2019-07-22

## 2019-07-23 ENCOUNTER — HOSPITAL ENCOUNTER (OUTPATIENT)
Dept: GENERAL RADIOLOGY | Facility: HOSPITAL | Age: 44
Discharge: HOME | End: 2019-07-23
Admitting: OBSTETRICS & GYNECOLOGY

## 2019-07-23 ENCOUNTER — APPOINTMENT (OUTPATIENT)
Dept: PREADMISSION TESTING | Facility: HOSPITAL | Age: 44
End: 2019-07-23

## 2019-07-23 VITALS — BODY MASS INDEX: 39.55 KG/M2 | WEIGHT: 252 LBS | HEIGHT: 67 IN

## 2019-07-23 DIAGNOSIS — Z30.2 REQUEST FOR STERILIZATION: ICD-10-CM

## 2019-07-23 LAB
ANION GAP SERPL CALCULATED.3IONS-SCNC: 10.7 MMOL/L (ref 10–20)
BACTERIA UR QL AUTO: ABNORMAL /HPF
BASOPHILS # BLD AUTO: 0.06 10*3/MM3 (ref 0–0.2)
BASOPHILS NFR BLD AUTO: 0.6 % (ref 0–1.5)
BILIRUB UR QL STRIP: NEGATIVE
BUN BLD-MCNC: 13 MG/DL (ref 7–20)
BUN/CREAT SERPL: 21.7 (ref 7.1–23.5)
CALCIUM SPEC-SCNC: 9 MG/DL (ref 8.4–10.2)
CHLORIDE SERPL-SCNC: 103 MMOL/L (ref 98–107)
CLARITY UR: ABNORMAL
CO2 SERPL-SCNC: 27 MMOL/L (ref 26–30)
COLOR UR: YELLOW
CREAT BLD-MCNC: 0.6 MG/DL (ref 0.6–1.3)
DEPRECATED RDW RBC AUTO: 52.4 FL (ref 37–54)
EOSINOPHIL # BLD AUTO: 0.33 10*3/MM3 (ref 0–0.4)
EOSINOPHIL NFR BLD AUTO: 3.5 % (ref 0.3–6.2)
ERYTHROCYTE [DISTWIDTH] IN BLOOD BY AUTOMATED COUNT: 16.4 % (ref 12.3–15.4)
GFR SERPL CREATININE-BSD FRML MDRD: 110 ML/MIN/1.73
GLUCOSE BLD-MCNC: 77 MG/DL (ref 74–98)
GLUCOSE UR STRIP-MCNC: NEGATIVE MG/DL
HCT VFR BLD AUTO: 41.4 % (ref 34–46.6)
HGB BLD-MCNC: 12.9 G/DL (ref 12–15.9)
HGB UR QL STRIP.AUTO: ABNORMAL
HYALINE CASTS UR QL AUTO: ABNORMAL /LPF
IMM GRANULOCYTES # BLD AUTO: 0.02 10*3/MM3 (ref 0–0.05)
IMM GRANULOCYTES NFR BLD AUTO: 0.2 % (ref 0–0.5)
KETONES UR QL STRIP: NEGATIVE
LEUKOCYTE ESTERASE UR QL STRIP.AUTO: NEGATIVE
LYMPHOCYTES # BLD AUTO: 2.8 10*3/MM3 (ref 0.7–3.1)
LYMPHOCYTES NFR BLD AUTO: 29.3 % (ref 19.6–45.3)
MCH RBC QN AUTO: 26.9 PG (ref 26.6–33)
MCHC RBC AUTO-ENTMCNC: 31.2 G/DL (ref 31.5–35.7)
MCV RBC AUTO: 86.4 FL (ref 79–97)
MONOCYTES # BLD AUTO: 0.48 10*3/MM3 (ref 0.1–0.9)
MONOCYTES NFR BLD AUTO: 5 % (ref 5–12)
MUCOUS THREADS URNS QL MICRO: ABNORMAL /HPF
NEUTROPHILS # BLD AUTO: 5.87 10*3/MM3 (ref 1.7–7)
NEUTROPHILS NFR BLD AUTO: 61.4 % (ref 42.7–76)
NITRITE UR QL STRIP: NEGATIVE
NRBC BLD AUTO-RTO: 0 /100 WBC (ref 0–0.2)
PH UR STRIP.AUTO: 5 [PH] (ref 5–8)
PLATELET # BLD AUTO: 315 10*3/MM3 (ref 140–450)
PMV BLD AUTO: 10.6 FL (ref 6–12)
POTASSIUM BLD-SCNC: 3.7 MMOL/L (ref 3.5–5.1)
PROT UR QL STRIP: ABNORMAL
RBC # BLD AUTO: 4.79 10*6/MM3 (ref 3.77–5.28)
RBC # UR: ABNORMAL /HPF
REF LAB TEST METHOD: ABNORMAL
SODIUM BLD-SCNC: 137 MMOL/L (ref 137–145)
SP GR UR STRIP: 1.03 (ref 1–1.03)
SQUAMOUS #/AREA URNS HPF: ABNORMAL /HPF
UROBILINOGEN UR QL STRIP: ABNORMAL
WBC NRBC COR # BLD: 9.56 10*3/MM3 (ref 3.4–10.8)
WBC UR QL AUTO: ABNORMAL /HPF

## 2019-07-23 PROCEDURE — 85025 COMPLETE CBC W/AUTO DIFF WBC: CPT | Performed by: OBSTETRICS & GYNECOLOGY

## 2019-07-23 PROCEDURE — 81001 URINALYSIS AUTO W/SCOPE: CPT | Performed by: OBSTETRICS & GYNECOLOGY

## 2019-07-23 PROCEDURE — 71045 X-RAY EXAM CHEST 1 VIEW: CPT

## 2019-07-23 PROCEDURE — 80048 BASIC METABOLIC PNL TOTAL CA: CPT | Performed by: OBSTETRICS & GYNECOLOGY

## 2019-07-23 PROCEDURE — 36415 COLL VENOUS BLD VENIPUNCTURE: CPT

## 2019-07-28 PROCEDURE — S0260 H&P FOR SURGERY: HCPCS | Performed by: OBSTETRICS & GYNECOLOGY

## 2019-07-30 ENCOUNTER — HOSPITAL ENCOUNTER (OUTPATIENT)
Facility: HOSPITAL | Age: 44
Setting detail: HOSPITAL OUTPATIENT SURGERY
Discharge: HOME | End: 2019-07-30
Attending: OBSTETRICS & GYNECOLOGY | Admitting: OBSTETRICS & GYNECOLOGY

## 2019-07-30 ENCOUNTER — ANESTHESIA (OUTPATIENT)
Dept: PERIOP | Facility: HOSPITAL | Age: 44
End: 2019-07-30

## 2019-07-30 ENCOUNTER — ANESTHESIA EVENT (OUTPATIENT)
Dept: PERIOP | Facility: HOSPITAL | Age: 44
End: 2019-07-30

## 2019-07-30 VITALS
RESPIRATION RATE: 16 BRPM | OXYGEN SATURATION: 99 % | TEMPERATURE: 97.8 F | HEART RATE: 54 BPM | DIASTOLIC BLOOD PRESSURE: 59 MMHG | SYSTOLIC BLOOD PRESSURE: 141 MMHG

## 2019-07-30 DIAGNOSIS — Z30.2 REQUEST FOR STERILIZATION: ICD-10-CM

## 2019-07-30 LAB
B-HCG UR QL: NEGATIVE
INTERNAL NEGATIVE CONTROL: NEGATIVE
INTERNAL POSITIVE CONTROL: POSITIVE
Lab: NORMAL

## 2019-07-30 PROCEDURE — 25010000002 KETOROLAC TROMETHAMINE PER 15 MG: Performed by: NURSE ANESTHETIST, CERTIFIED REGISTERED

## 2019-07-30 PROCEDURE — 25010000002 FENTANYL CITRATE (PF) 250 MCG/5ML SOLUTION: Performed by: NURSE ANESTHETIST, CERTIFIED REGISTERED

## 2019-07-30 PROCEDURE — 81025 URINE PREGNANCY TEST: CPT | Performed by: OBSTETRICS & GYNECOLOGY

## 2019-07-30 PROCEDURE — 25010000002 DEXAMETHASONE PER 1 MG: Performed by: NURSE ANESTHETIST, CERTIFIED REGISTERED

## 2019-07-30 PROCEDURE — 25010000002 ONDANSETRON PER 1 MG: Performed by: NURSE ANESTHETIST, CERTIFIED REGISTERED

## 2019-07-30 PROCEDURE — 58671 LAPAROSCOPY TUBAL BLOCK: CPT | Performed by: OBSTETRICS & GYNECOLOGY

## 2019-07-30 PROCEDURE — 25010000002 PROPOFOL 200 MG/20ML EMULSION: Performed by: NURSE ANESTHETIST, CERTIFIED REGISTERED

## 2019-07-30 DEVICE — CLIP FALLOP FILSHIE TI PR STRL BX/20: Type: IMPLANTABLE DEVICE | Site: ABDOMEN | Status: FUNCTIONAL

## 2019-07-30 RX ORDER — SODIUM CHLORIDE 0.9 % (FLUSH) 0.9 %
3 SYRINGE (ML) INJECTION EVERY 12 HOURS SCHEDULED
Status: DISCONTINUED | OUTPATIENT
Start: 2019-07-30 | End: 2019-07-30 | Stop reason: HOSPADM

## 2019-07-30 RX ORDER — HYDROCODONE BITARTRATE AND ACETAMINOPHEN 5; 325 MG/1; MG/1
1 TABLET ORAL EVERY 6 HOURS PRN
Qty: 12 TABLET | Refills: 0 | Status: SHIPPED | OUTPATIENT
Start: 2019-07-30 | End: 2019-10-09

## 2019-07-30 RX ORDER — NEOSTIGMINE METHYLSULFATE 5 MG/5 ML
SYRINGE (ML) INTRAVENOUS AS NEEDED
Status: DISCONTINUED | OUTPATIENT
Start: 2019-07-30 | End: 2019-07-30 | Stop reason: SURG

## 2019-07-30 RX ORDER — SULFAMETHOXAZOLE AND TRIMETHOPRIM 800; 160 MG/1; MG/1
1 TABLET ORAL 2 TIMES DAILY
Qty: 14 TABLET | Refills: 0 | Status: SHIPPED | OUTPATIENT
Start: 2019-07-30 | End: 2019-08-06

## 2019-07-30 RX ORDER — LIDOCAINE HYDROCHLORIDE 20 MG/ML
INJECTION, SOLUTION INTRAVENOUS AS NEEDED
Status: DISCONTINUED | OUTPATIENT
Start: 2019-07-30 | End: 2019-07-30 | Stop reason: SURG

## 2019-07-30 RX ORDER — ACETAMINOPHEN 500 MG
1000 TABLET ORAL ONCE
Status: COMPLETED | OUTPATIENT
Start: 2019-07-30 | End: 2019-07-30

## 2019-07-30 RX ORDER — SODIUM CHLORIDE 0.9 % (FLUSH) 0.9 %
1-10 SYRINGE (ML) INJECTION AS NEEDED
Status: DISCONTINUED | OUTPATIENT
Start: 2019-07-30 | End: 2019-07-30 | Stop reason: HOSPADM

## 2019-07-30 RX ORDER — ROCURONIUM BROMIDE 10 MG/ML
INJECTION, SOLUTION INTRAVENOUS AS NEEDED
Status: DISCONTINUED | OUTPATIENT
Start: 2019-07-30 | End: 2019-07-30 | Stop reason: SURG

## 2019-07-30 RX ORDER — ONDANSETRON 2 MG/ML
INJECTION INTRAMUSCULAR; INTRAVENOUS AS NEEDED
Status: DISCONTINUED | OUTPATIENT
Start: 2019-07-30 | End: 2019-07-30 | Stop reason: SURG

## 2019-07-30 RX ORDER — FENTANYL CITRATE 50 UG/ML
INJECTION, SOLUTION INTRAMUSCULAR; INTRAVENOUS AS NEEDED
Status: DISCONTINUED | OUTPATIENT
Start: 2019-07-30 | End: 2019-07-30 | Stop reason: SURG

## 2019-07-30 RX ORDER — GLYCOPYRROLATE 0.2 MG/ML
INJECTION INTRAMUSCULAR; INTRAVENOUS AS NEEDED
Status: DISCONTINUED | OUTPATIENT
Start: 2019-07-30 | End: 2019-07-30 | Stop reason: SURG

## 2019-07-30 RX ORDER — IBUPROFEN 800 MG/1
800 TABLET ORAL EVERY 8 HOURS PRN
Qty: 30 TABLET | Refills: 0 | Status: SHIPPED | OUTPATIENT
Start: 2019-07-30 | End: 2019-10-09

## 2019-07-30 RX ORDER — ONDANSETRON 2 MG/ML
4 INJECTION INTRAMUSCULAR; INTRAVENOUS ONCE AS NEEDED
Status: DISCONTINUED | OUTPATIENT
Start: 2019-07-30 | End: 2019-07-30 | Stop reason: HOSPADM

## 2019-07-30 RX ORDER — IPRATROPIUM BROMIDE AND ALBUTEROL SULFATE 2.5; .5 MG/3ML; MG/3ML
3 SOLUTION RESPIRATORY (INHALATION) ONCE AS NEEDED
Status: DISCONTINUED | OUTPATIENT
Start: 2019-07-30 | End: 2019-07-30 | Stop reason: HOSPADM

## 2019-07-30 RX ORDER — KETOROLAC TROMETHAMINE 30 MG/ML
INJECTION, SOLUTION INTRAMUSCULAR; INTRAVENOUS AS NEEDED
Status: DISCONTINUED | OUTPATIENT
Start: 2019-07-30 | End: 2019-07-30 | Stop reason: SURG

## 2019-07-30 RX ORDER — PROMETHAZINE HYDROCHLORIDE 25 MG/ML
6.25 INJECTION, SOLUTION INTRAMUSCULAR; INTRAVENOUS ONCE AS NEEDED
Status: DISCONTINUED | OUTPATIENT
Start: 2019-07-30 | End: 2019-07-30 | Stop reason: HOSPADM

## 2019-07-30 RX ORDER — DEXAMETHASONE SODIUM PHOSPHATE 4 MG/ML
INJECTION, SOLUTION INTRA-ARTICULAR; INTRALESIONAL; INTRAMUSCULAR; INTRAVENOUS; SOFT TISSUE AS NEEDED
Status: DISCONTINUED | OUTPATIENT
Start: 2019-07-30 | End: 2019-07-30 | Stop reason: SURG

## 2019-07-30 RX ORDER — PROMETHAZINE HYDROCHLORIDE 25 MG/1
25 TABLET ORAL ONCE AS NEEDED
Status: DISCONTINUED | OUTPATIENT
Start: 2019-07-30 | End: 2019-07-30 | Stop reason: HOSPADM

## 2019-07-30 RX ORDER — SODIUM CHLORIDE, SODIUM LACTATE, POTASSIUM CHLORIDE, CALCIUM CHLORIDE 600; 310; 30; 20 MG/100ML; MG/100ML; MG/100ML; MG/100ML
1000 INJECTION, SOLUTION INTRAVENOUS CONTINUOUS
Status: DISCONTINUED | OUTPATIENT
Start: 2019-07-30 | End: 2019-07-30 | Stop reason: HOSPADM

## 2019-07-30 RX ORDER — MEPERIDINE HYDROCHLORIDE 50 MG/ML
12.5 INJECTION INTRAMUSCULAR; INTRAVENOUS; SUBCUTANEOUS
Status: COMPLETED | OUTPATIENT
Start: 2019-07-30 | End: 2019-07-30

## 2019-07-30 RX ORDER — MEPERIDINE HYDROCHLORIDE 50 MG/ML
INJECTION INTRAMUSCULAR; INTRAVENOUS; SUBCUTANEOUS
Status: COMPLETED
Start: 2019-07-30 | End: 2019-07-30

## 2019-07-30 RX ORDER — PROMETHAZINE HYDROCHLORIDE 25 MG/1
25 TABLET ORAL EVERY 6 HOURS PRN
Qty: 20 TABLET | Refills: 0 | Status: SHIPPED | OUTPATIENT
Start: 2019-07-30 | End: 2019-10-09

## 2019-07-30 RX ORDER — SODIUM CHLORIDE 0.9 % (FLUSH) 0.9 %
3 SYRINGE (ML) INJECTION AS NEEDED
Status: DISCONTINUED | OUTPATIENT
Start: 2019-07-30 | End: 2019-07-30 | Stop reason: HOSPADM

## 2019-07-30 RX ORDER — PROPOFOL 10 MG/ML
INJECTION, EMULSION INTRAVENOUS AS NEEDED
Status: DISCONTINUED | OUTPATIENT
Start: 2019-07-30 | End: 2019-07-30 | Stop reason: SURG

## 2019-07-30 RX ORDER — MAGNESIUM HYDROXIDE 1200 MG/15ML
LIQUID ORAL AS NEEDED
Status: DISCONTINUED | OUTPATIENT
Start: 2019-07-30 | End: 2019-07-30 | Stop reason: HOSPADM

## 2019-07-30 RX ORDER — PROMETHAZINE HYDROCHLORIDE 25 MG/1
25 SUPPOSITORY RECTAL ONCE AS NEEDED
Status: DISCONTINUED | OUTPATIENT
Start: 2019-07-30 | End: 2019-07-30 | Stop reason: HOSPADM

## 2019-07-30 RX ADMIN — GLYCOPYRROLATE 0.6 MG: 0.2 INJECTION, SOLUTION INTRAMUSCULAR; INTRAVENOUS at 11:41

## 2019-07-30 RX ADMIN — MEPERIDINE HYDROCHLORIDE 12.5 MG: 50 INJECTION INTRAMUSCULAR; INTRAVENOUS; SUBCUTANEOUS at 12:03

## 2019-07-30 RX ADMIN — DEXAMETHASONE SODIUM PHOSPHATE 4 MG: 4 INJECTION, SOLUTION INTRAMUSCULAR; INTRAVENOUS at 11:13

## 2019-07-30 RX ADMIN — ROCURONIUM BROMIDE 25 MG: 10 INJECTION INTRAVENOUS at 11:02

## 2019-07-30 RX ADMIN — PROPOFOL 200 MG: 10 INJECTION, EMULSION INTRAVENOUS at 11:02

## 2019-07-30 RX ADMIN — ONDANSETRON 4 MG: 2 INJECTION INTRAMUSCULAR; INTRAVENOUS at 11:13

## 2019-07-30 RX ADMIN — Medication 4 MG: at 11:41

## 2019-07-30 RX ADMIN — ROCURONIUM BROMIDE 10 MG: 10 INJECTION INTRAVENOUS at 11:28

## 2019-07-30 RX ADMIN — MEPERIDINE HYDROCHLORIDE 12.5 MG: 50 INJECTION INTRAMUSCULAR; INTRAVENOUS; SUBCUTANEOUS at 12:09

## 2019-07-30 RX ADMIN — KETOROLAC TROMETHAMINE 30 MG: 30 INJECTION, SOLUTION INTRAMUSCULAR at 11:45

## 2019-07-30 RX ADMIN — ACETAMINOPHEN 1000 MG: 500 TABLET, FILM COATED ORAL at 09:31

## 2019-07-30 RX ADMIN — SODIUM CHLORIDE, POTASSIUM CHLORIDE, SODIUM LACTATE AND CALCIUM CHLORIDE: 600; 310; 30; 20 INJECTION, SOLUTION INTRAVENOUS at 11:39

## 2019-07-30 RX ADMIN — FENTANYL CITRATE 50 MCG: 50 INJECTION, SOLUTION INTRAMUSCULAR; INTRAVENOUS at 11:02

## 2019-07-30 RX ADMIN — SODIUM CHLORIDE, POTASSIUM CHLORIDE, SODIUM LACTATE AND CALCIUM CHLORIDE 1000 ML: 600; 310; 30; 20 INJECTION, SOLUTION INTRAVENOUS at 09:25

## 2019-07-30 RX ADMIN — LIDOCAINE HYDROCHLORIDE 60 MG: 20 INJECTION, SOLUTION INTRAVENOUS at 11:02

## 2019-07-30 NOTE — ANESTHESIA POSTPROCEDURE EVALUATION
Patient: Tika Pugh    Procedure Summary     Date:  07/30/19 Room / Location:  [facility] OR 3 /  [facility] OR    Anesthesia Start:  1057 Anesthesia Stop:  1155    Procedure:  LAPAROSCOPIC TUBAL LIGATION (N/A Abdomen) Diagnosis:       Request for sterilization      (Request for sterilization [Z30.2])    Surgeon:  Monse Wayne MD Provider:  Madison Linares CRNA    Anesthesia Type:  general ASA Status:  3          Anesthesia Type: general  Last vitals  BP   149/59 (07/30/19 1255)   Temp   97.8 °F (36.6 °C) (07/30/19 1255)   Pulse   (!) 45 (07/30/19 1255)   Resp   16 (07/30/19 1255)     SpO2   100 % (07/30/19 1255)     Post Anesthesia Care and Evaluation    Patient location during evaluation: PHASE II  Patient participation: complete - patient participated  Level of consciousness: awake and alert  Pain score: 2  Pain management: satisfactory to patient  Airway patency: patent  Anesthetic complications: No anesthetic complications  PONV Status: none  Cardiovascular status: acceptable and stable  Respiratory status: acceptable  Hydration status: acceptable

## 2019-07-30 NOTE — ANESTHESIA PROCEDURE NOTES
Airway  Urgency: elective    Airway not difficult    General Information and Staff    Patient location during procedure: OR  CRNA: Madison Linares CRNA    Indications and Patient Condition  Indications for airway management: airway protection    Preoxygenated: yes  MILS not maintained throughout  Mask difficulty assessment: 1 - vent by mask    Final Airway Details  Final airway type: endotracheal airway      Successful airway: ETT  Cuffed: yes   Successful intubation technique: direct laryngoscopy  Facilitating devices/methods: intubating stylet and Bougie  Endotracheal tube insertion site: oral  Blade: Loren  Blade size: 3  ETT size (mm): 7.0  Cormack-Lehane Classification: grade III - view of epiglottis only  Placement verified by: chest auscultation and capnometry   Cuff volume (mL): 20  Measured from: lips  ETT to lips (cm): 20  Number of attempts at approach: 2    Additional Comments  Negative epigastric sounds, Breath sound equal bilaterally with symmetric chest rise and fall, teeth as pre-op

## 2019-07-30 NOTE — ANESTHESIA PREPROCEDURE EVALUATION
Anesthesia Evaluation     Patient summary reviewed and Nursing notes reviewed   history of anesthetic complications: prolonged sedation  NPO Solid Status: > 8 hours  NPO Liquid Status: > 8 hours           Airway   Mallampati: II  TM distance: >3 FB  Neck ROM: full  No difficulty expected  Dental - normal exam   (+) upper dentures    Pulmonary - normal exam   (+) asthma,   Cardiovascular - normal exam  Exercise tolerance: good (4-7 METS)    (+) hypertension, valvular problems/murmurs murmur, past MI  3-6 months, CAD, CHF,       Neuro/Psych  (+) headaches, numbness,     GI/Hepatic/Renal/Endo    (+) morbid obesity, GERD,      Musculoskeletal (-) negative ROS    Abdominal  - normal exam    Bowel sounds: normal.   Substance History - negative use     OB/GYN negative ob/gyn ROS         Other        ROS/Med Hx Other: March 2019- delivery complicated by CHF and MI  Cardiac clearance- cleared by Dr. Spence    · This was a limited echocardiogram to assess left ventricular function only.  · Left ventricular systolic function is normal. Estimated EF appears to be in the range of 51 - 55%.  · Left ventricular wall thickness is consistent with borderline concentric hypertrophy.  · The following left ventricular wall segments are dyskinetic: apical lateral, apical septal and apex.                Anesthesia Plan    ASA 3     general   (1 gm tylenol PO    )  intravenous induction   Anesthetic plan, all risks, benefits, and alternatives have been provided, discussed and informed consent has been obtained with: patient.    Plan discussed with attending.

## 2019-08-09 ENCOUNTER — OFFICE VISIT (OUTPATIENT)
Dept: OBSTETRICS AND GYNECOLOGY | Facility: CLINIC | Age: 44
End: 2019-08-09

## 2019-08-09 VITALS
BODY MASS INDEX: 39.52 KG/M2 | SYSTOLIC BLOOD PRESSURE: 120 MMHG | WEIGHT: 251.8 LBS | DIASTOLIC BLOOD PRESSURE: 76 MMHG | HEIGHT: 67 IN

## 2019-08-09 DIAGNOSIS — Z09 POSTOP CHECK: Primary | ICD-10-CM

## 2019-08-09 PROCEDURE — 99024 POSTOP FOLLOW-UP VISIT: CPT | Performed by: MIDWIFE

## 2019-08-15 LAB — BNP SERPL-MCNC: 227 PG/ML (ref 0–100)

## 2019-10-09 ENCOUNTER — OFFICE VISIT (OUTPATIENT)
Dept: OBSTETRICS AND GYNECOLOGY | Facility: CLINIC | Age: 44
End: 2019-10-09

## 2019-10-09 VITALS
WEIGHT: 253 LBS | BODY MASS INDEX: 39.71 KG/M2 | SYSTOLIC BLOOD PRESSURE: 132 MMHG | DIASTOLIC BLOOD PRESSURE: 74 MMHG | HEIGHT: 67 IN

## 2019-10-09 DIAGNOSIS — N91.2 AMENORRHEA: Primary | ICD-10-CM

## 2019-10-09 DIAGNOSIS — N95.1 MENOPAUSAL SYMPTOMS: ICD-10-CM

## 2019-10-09 PROCEDURE — 99214 OFFICE O/P EST MOD 30 MIN: CPT | Performed by: OBSTETRICS & GYNECOLOGY

## 2019-10-09 PROCEDURE — 81025 URINE PREGNANCY TEST: CPT | Performed by: OBSTETRICS & GYNECOLOGY

## 2019-10-09 NOTE — PROGRESS NOTES
Subjective  Chief Complaint   Patient presents with   • Amenorrhea     Patient advised no menses since having Tubal ligation on 2019.      Patient is 43 y.o.  here for evaluation of abnormal menstrual cycles as well as menopausal symptoms.  Patient reports she had an episode of mild spotting in mid July.  Patient has had no further bleeding since that time.  Patient has been having hot flashes as well as night sweats.  Patient reports onset of symptoms over the last several months.  Patient reports a family history of going through menopause in the early 40s.  Patient does report she has had significant stress recently.  Patient has a 4-month-old infant as well as now fostering a  infant.  Patient denies any changes in her health otherwise.  Patient denies any changes in her medications.  Patient did have a previous tubal ligation.    History  Past Medical History:   Diagnosis Date   • Asthma 2019    Reported no use of inhalers for several years now   • Body piercing     ears   • Burning with urination    • Carpal tunnel syndrome during pregnancy    • Chest pain 2019    Reported during pregnancy and that she remembered experiencing this the day prior to delivery.    • CHF (congestive heart failure) (CMS/[facility])     Reported this was a questionable diagnosis during pregnancy (delivered via P C/S 19)   • Delayed emergence from anesthesia    • Dental anomaly     chipped teeth left side   • Frequent UTI    • GERD (gastroesophageal reflux disease)     Reported that this has improved since delivery    • HELLP syndrome    • HELLP syndrome     during pregnancy gave birth in 2019   • Hematuria     Reported history of recurrent, unknown etiology   • Hemorrhoid    • History of bronchitis    • History of cardiac murmur in childhood    • History of fracture     Left wrist - no surgical intervention was required   • History of pneumonia    • Hx of gastric ulcer    • Hypertension      "Reported \"only during pregnancy\"   • Kidney stone     Reported history of kidney stones but no history of surgical intervention   • MI (myocardial infarction) (CMS/[facility])     Patient reported that she was told after delivery of child 03-14-19 and cardiac cath 03-18-19 that she had a MI sometime during her pregnancy.  Patient reported that during pregnancy she was sent to [facility] for weight gain of 60# in a 10 day period.    • Migraine    • Murmur    • Proteinuria     Reported recurrent history, unknown etiology   • Rh negative status during pregnancy 08/28/2018   • Stress headache    • Tattoos    • Teeth missing     upper and lower   • Ulcer of abdomen wall (CMS/[facility])    • Ulcer, stomach peptic    • Urinary tract infection    • Vitamin B 12 deficiency    • Wears contact lenses     Advised to wear glasses the DOS   • Wears glasses    • Wears partial dentures     Upper partial - instructed no adhesives the DOS     Current Outpatient Medications on File Prior to Visit   Medication Sig Dispense Refill   • acetaminophen (TYLENOL) 500 MG tablet Take 500 mg by mouth Every 6 (Six) Hours As Needed for Mild Pain  or Headache.     • cetirizine (zyrTEC) 10 MG tablet Take 10 mg by mouth As Needed for Allergies or Rhinitis.     • furosemide (LASIX) 40 MG tablet Take 1 tablet by mouth Daily. (Patient taking differently: Take 40 mg by mouth Daily As Needed (Fluid retention).) 30 tablet 5   • HYDROcodone-acetaminophen (NORCO) 5-325 MG per tablet Take 1 tablet by mouth Every 6 (Six) Hours As Needed for pain 12 tablet 0   • ibuprofen (ADVIL,MOTRIN) 800 MG tablet Take 1 tablet by mouth Every 8 (Eight) Hours As Needed for Mild Pain 30 tablet 0   • potassium chloride (K-DUR,KLOR-CON) 20 MEQ CR tablet Take 20 mEq by mouth Daily As Needed (To take as supplement on the days that Lasix is taken).     • promethazine (PHENERGAN) 25 MG tablet Take 1 tablet by mouth Every 6 (Six) Hours As Needed for Nausea or Vomiting 20 tablet 0     No " "current facility-administered medications on file prior to visit.      Allergies   Allergen Reactions   • Lisinopril Cough   • Lactose Intolerance (Gi) GI Intolerance     Past Surgical History:   Procedure Laterality Date   • CARDIAC CATHETERIZATION N/A 3/19/2019    Procedure: LEFT HEART CATH;  Surgeon: Eb Spence MD;  Location:  [facility];  Service: Cardiology   •  SECTION N/A 3/14/2019    Procedure:  SECTION PRIMARY;  Surgeon: Ramses Pardo III, MD;  Location:  [facility];  Service: Obstetrics/Gynecology   • CYSTOSCOPY     • ENDOSCOPY     • KNEE ACL RECONSTRUCTION Left    • TONSILLECTOMY     • TUBAL COAGULATION LAPAROSCOPIC N/A 2019    Procedure: LAPAROSCOPIC TUBAL LIGATION;  Surgeon: Monse Wayne MD;  Location:  [facility];  Service: Obstetrics/Gynecology   • WISDOM TOOTH EXTRACTION       Family History   Problem Relation Age of Onset   • Diabetes Father    • Hypertension Father    • Stroke Father    • Cancer Brother    • Heart disease Maternal Grandmother    • Stroke Paternal Grandmother      Social History     Socioeconomic History   • Marital status: Single     Spouse name: Not on file   • Number of children: Not on file   • Years of education: Not on file   • Highest education level: Not on file   Tobacco Use   • Smoking status: Current Every Day Smoker     Packs/day: 0.75     Years: 23.00     Pack years: 17.25     Types: Cigarettes   • Smokeless tobacco: Never Used   Substance and Sexual Activity   • Alcohol use: No   • Drug use: No   • Sexual activity: Yes     Partners: Male     Birth control/protection: Surgical     Review of Systems  All systems were reviewed and negative except for:  Genitourinary: postivie for  abnormal menstrual bleeding  Endocrine: positive for  hot flashes and night sweats     Objective  Vitals:    10/09/19 1404   BP: 132/74   Weight: 115 kg (253 lb)   Height: 170.2 cm (67\")     Physical Exam:  General Appearance: alert, " appears stated age and cooperative  Head: normocephalic, without obvious abnormality and atraumatic  Eyes: lids and lashes normal, conjunctivae and sclerae normal, no icterus, no pallor, corneas clear and PERRLA  Ears: ears appear intact with no abnormalities noted  Nose: nares normal, septum midline, mucosa normal and no drainage  Neck: suppple, trachea midline and no thyromegaly  Lungs: clear to auscultation, respirations regular, respirations even and respirations unlabored  Heart: regular rhythm and normal rate, normal S1, S2, no murmur, gallop, or rubs and no click  Breasts: Not performed.  Abdomen: normal bowel sounds, no masses, no hepatomegaly, no splenomegaly, soft non-tender, no guarding and no rebound tenderness  Pelvic: Not performed.  Extremities: moves extremities well, no edema, no cyanosis and no redness  Skin: no bleeding, bruising or rash and no lesions noted  Lymph Nodes: no palpable adenopathy  Neuro: CN II-X grossly intact; sensation intact  Psych: normal mood and affect, oriented to person, time and place, thought content organized and appropriate judgment  Lab Review   UPT negative    Imaging   No data reviewed    Decision to Obtain Medical Records  No    Summary of Medical Records  No    Assessment/Plan  Problem List Items Addressed This Visit     None      Visit Diagnoses     Amenorrhea    -  Primary New  Patient with new onset amenorrhea.  Will obtain the following labs today as noted.  Instructions and precautions have been given.  Patient is to call for the results.  If the patient's hCG level is negative and she is not in the menopausal range then patient will need Provera for withdrawal challenge.  Instructions and precautions have been given.  Plan pending results.    Relevant Orders    POC Pregnancy, Urine (Completed)    Follicle Stimulating Hormone    Estradiol    Testosterone, Free, Total    HCG, B-subunit, Quantitative    Thyroid Panel With TSH    Menopausal symptoms     New  Patient with new onset menopausal symptoms as noted.  Will obtain the following labs today.  Instructions and precautions have been given.  Patient is to call for the results.    Relevant Orders    Follicle Stimulating Hormone    Estradiol    Testosterone, Free, Total        Follow up as discussed/scheduled  Note: Speech recognition transcription software may have been used to dictate portions of this document.  An attempt at proofreading has been made though minor errors in transcription may still be present.  This note was electronically signed.  Monse Wayne M.D.

## 2019-10-11 LAB
ESTRADIOL SERPL-MCNC: 38.2 PG/ML
FSH SERPL-ACNC: 43 MIU/ML
FT4I SERPL CALC-MCNC: 1.4 (ref 1.2–4.9)
HCG INTACT+B SERPL-ACNC: <1 MIU/ML
T3RU NFR SERPL: 23 % (ref 24–39)
T4 SERPL-MCNC: 6.1 UG/DL (ref 4.5–12)
TESTOST FREE SERPL-MCNC: 0.5 PG/ML (ref 0–4.2)
TESTOST SERPL-MCNC: 14 NG/DL (ref 8–48)
TSH SERPL DL<=0.005 MIU/L-ACNC: 1.36 UIU/ML (ref 0.45–4.5)

## 2019-10-14 ENCOUNTER — TELEPHONE (OUTPATIENT)
Dept: OBSTETRICS AND GYNECOLOGY | Facility: CLINIC | Age: 44
End: 2019-10-14

## 2019-10-14 NOTE — TELEPHONE ENCOUNTER
Spoke with Dr. Nowak about test results and he reviewed labs and suggested that it looks like early menopause and that Dr. Wayne will review once she is back in the office.

## 2019-10-14 NOTE — TELEPHONE ENCOUNTER
----- Message from Alysha Riley sent at 10/14/2019  9:44 AM EDT -----  Contact: PT  THIS IS DR SALAS'S PT.  SHE CALLED FOR LAB RESULTS.  PLEASE CALL HER ONCE REVIEWED.  THANKS

## 2020-07-16 NOTE — PROGRESS NOTES
Chief Complaint   Patient presents with   • Routine Prenatal Visit     Has questions regarding abnormal pap and Rhogam , Request information on informa seq        HPI:   , 10w4d gestation reports doing well    ROS:  See Prenatal Episode/Flowsheet  /70   Wt 108 kg (238 lb)   LMP  (LMP Unknown)   BMI 38.41 kg/m²      EXAM:  EXTREMITIES:  No swelling-See Prenatal Episode/Flowsheet    ABDOMEN:  FHTs/Movement noted-See Prenatal Episode/Flowsheet    URINE GLUCOSE/PROTEIN:  See Prenatal Episode/Flowsheet    PELVIC EXAM:  See Prenatal Episode/Flowsheet  CV:  Lungs:    MDM:    Lab Results   Component Value Date    HGB 13.4 2018    RUBELLAABIGG 12.50 2018    HEPBSAG Negative 2018    ABO A 2018    RH Negative 2018    ABSCRN Negative 2018    QKG6VWD2 Non Reactive 2018    HEPCVIRUSABY 0.1 2018    URINECX Final report 2018       U/S:    1. IUP 10w4d  2. Routine care   3. AMA: informaseq, discussing Nuchal   ,

## 2024-07-15 NOTE — PROGRESS NOTES
Postpartum Progress Note    Patient name: Tika Pugh  YOB: 1975   MRN: [id number]  Referring Provider: Arash Jeffries*  Admission Date: 3/13/2019  Date of Service: 3/16/2019    ID: 43 y.o.     Diagnosis:   S/p  delivery 2 Days Post-Op   Patient Active Problem List   Diagnosis   • Chronic nonintractable headache   • Gastroesophageal reflux disease   • Maternal tobacco use in first trimester   • Rh negative status during pregnancy   • Anemia due to vitamin B12 deficiency   • Uterine leiomyoma   • AMA (advanced maternal age) multigravida 35+   • Carpal tunnel syndrome of left wrist   • Nephrotic range proteinuria   • Single umbilical artery   • Poor fetal growth affecting management of mother in third trimester   • Morbid obesity with BMI of 45.0-49.9, adult (CMS/HCC)   • Pre-eclampsia in third trimester   • Gestational hypertension       Subjective:      No complaints.  Moderate lochia.  Ambulating, voiding, tolerating diet.  Pain well controlled.  The patient is currently breastfeeding.   This baby is a [unfilled].    Objective:      Vital signs:  Vital Signs Range for the last 24 hours  Temperature: Temp:  [98.2 °F (36.8 °C)-98.5 °F (36.9 °C)] 98.5 °F (36.9 °C)   Temp Source: Temp src: Oral   BP: BP: ()/(54-62) 132/62   Pulse: Heart Rate:  [61-68] 68   Respirations: Resp:  [16-18] 16   Weight: (!) 137 kg (302 lb)     General: Alert & oriented x4, in no apparent distress  Abdomen: soft, appropriately tender   KIKO drain with a scant amount   Uterus: firm, appropriately tender  Incision: clean, dry, intact, dressing clean, suture  Extremities: nontender;  2+ edema      Labs:  Lab Results   Component Value Date    WBC 16.20 (H) 03/15/2019    HGB 9.9 (L) 03/15/2019    HCT 31.0 (L) 03/15/2019    MCV 96.6 03/15/2019     (L) 03/15/2019     Results from last 7 days   Lab Units 19  1331   ABO TYPING  A   RH TYPING  Negative     External Prenatal Results      Pregnancy Outside Results - Transcribed From Office Records - See Scanned Records For Details     Test Value Date Time    Hgb 9.9 g/dL 03/15/19 0752    Hct 31.0 % 03/15/19 0752    ABO A  19 1331    Rh Negative  19 1331    Antibody Screen Positive  19 1716    Glucose Fasting GTT       Glucose Tolerance Test 1 hour       Glucose Tolerance Test 3 hour       Gonorrhea (discrete) Negative  18     Chlamydia (discrete) Negative  18     RPR Non Reactive  18 1539    VDRL       Syphilis Antibody       Rubella 12.50 index 18 1539    HBsAg Negative  18 1539    Herpes Simplex Virus PCR       Herpes Simplex VIrus Culture       HIV Non Reactive  18 1539    Hep C RNA Quant PCR       Hep C Antibody 0.1 s/co ratio 18 1539    AFP       Group B Strep Negative  19 1640    GBS Susceptibility to Clindamycin       GBS Susceptibility to Erythromycin       Fetal Fibronectin       Genetic Testing, Maternal Blood             Drug Screening     Test Value Date Time    Urine Drug Screen       Amphetamine Screen       Barbiturate Screen       Benzodiazepine Screen       Methadone Screen       Phencyclidine Screen       Opiates Screen       THC Screen       Cocaine Screen       Propoxyphene Screen       Buprenorphine Screen       Methamphetamine Screen       Oxycodone Screen       Tricyclic Antidepressants Screen                   Assessment/Plan:      2 Days Post-Op s/p Procedure(s):   SECTION PRIMARY  1. POD # 2 after a primary LTCS (closure).  Doing well.  2.  Preeclampsia-blood pressure stable on lisinopril and labetalol  3.  Cardiomyopathy stable on lisinopril, beta-blocker, daily diuretics  4.  Morbid obesity  5.  Advanced maternal age  6.  Thrombocytopenia stable  7.  Tobacco abuse  8.  Maternal blood type Rh- patient received RhoGam 3/15/19  9 blood loss anemia (mild) postop H&H 9.    PLAN  1.  Continue postoperative care  2.  Encourage ambulation and pulmonary  toilet  3.  Cardiology plans to perform heart cath early next week    . Questions were answered.         Anxious

## (undated) DEVICE — JP PERF DRN SIL FLT 10MM FULL: Brand: CARDINAL HEALTH

## (undated) DEVICE — SOL IRR H2O BTL 1000ML STRL

## (undated) DEVICE — JACKSON-PRATT 100CC BULB RESERVOIR: Brand: CARDINAL HEALTH

## (undated) DEVICE — MAT PREVALON MOBL TRANSFR AIR W/PAD 39X80IN

## (undated) DEVICE — GLV SURG BIOGEL M LTX PF 6 1/2

## (undated) DEVICE — SUT VIC 3/0 CT1 27IN DYED J338H

## (undated) DEVICE — CATH DIAG EXPO M/ PK 5F FL4/FR4 PIG

## (undated) DEVICE — SPNG GZ WOVN 4X4IN 12PLY 10/BX STRL

## (undated) DEVICE — ENDOPATH XCEL BLADELESS TROCARS WITH STABILITY SLEEVES: Brand: ENDOPATH XCEL

## (undated) DEVICE — TRY SPINE BLCK WHITACRE 25G 3X5IN

## (undated) DEVICE — SYS SKIN CLS DERMABOND PRINEO W/22CM MESH TP

## (undated) DEVICE — PK C/SECT 10

## (undated) DEVICE — RICH LAVH: Brand: MEDLINE INDUSTRIES, INC.

## (undated) DEVICE — MODEL AT P65, P/N 701554-001KIT CONTENTS: HAND CONTROLLER, 3-WAY HIGH-PRESSURE STOPCOCK WITH ROTATING END AND PREMIUM HIGH-PRESSURE TUBING: Brand: ANGIOTOUCH® KIT

## (undated) DEVICE — SUT MONOCRYL SZ 4 0 18IN PS1 Y682H BX/36

## (undated) DEVICE — MODEL BT2000 P/N 700287-012KIT CONTENTS: MANIFOLD WITH SALINE AND CONTRAST PORTS, SALINE TUBING WITH SPIKE AND HAND SYRINGE, TRANSDUCER: Brand: BT2000 AUTOMATED MANIFOLD KIT

## (undated) DEVICE — SUT PDS 0 CTX 36IN DYED Z370T

## (undated) DEVICE — DEV COMP RAD PRELUDESYNC 24CM

## (undated) DEVICE — GLV SURG SENSICARE W/ALOE PF LF 7 STRL

## (undated) DEVICE — SOL IRR NACL 0.9PCT BT 1000ML

## (undated) DEVICE — CUFF SCD HEMOFORCE SEQ CALF STD MD

## (undated) DEVICE — CATH DIAG EXPO .045 FL3.5 5F 100CM

## (undated) DEVICE — GLV SURG SENSICARE W/ALOE PF LF 7.5 STRL

## (undated) DEVICE — SUT ETHLN 4/0 PS2 PLSTC 1667G

## (undated) DEVICE — INTRO SHEATH PRELUDE IDEAL SPRNG COIL 021 6F 23X80CM

## (undated) DEVICE — PK CATH CARD 10

## (undated) DEVICE — GW FC FLOP/TP .035 260CM 3MM